# Patient Record
Sex: MALE | Race: WHITE
[De-identification: names, ages, dates, MRNs, and addresses within clinical notes are randomized per-mention and may not be internally consistent; named-entity substitution may affect disease eponyms.]

---

## 2017-04-13 ENCOUNTER — HOSPITAL ENCOUNTER (INPATIENT)
Dept: HOSPITAL 74 - JER | Age: 77
LOS: 6 days | Discharge: HOME HEALTH | DRG: 392 | End: 2017-04-19
Attending: INTERNAL MEDICINE | Admitting: INTERNAL MEDICINE
Payer: COMMERCIAL

## 2017-04-13 VITALS — BODY MASS INDEX: 32.1 KG/M2

## 2017-04-13 DIAGNOSIS — J44.9: ICD-10-CM

## 2017-04-13 DIAGNOSIS — K31.84: Primary | ICD-10-CM

## 2017-04-13 DIAGNOSIS — I25.10: ICD-10-CM

## 2017-04-13 DIAGNOSIS — I69.354: ICD-10-CM

## 2017-04-13 DIAGNOSIS — I10: ICD-10-CM

## 2017-04-13 DIAGNOSIS — K59.00: ICD-10-CM

## 2017-04-13 DIAGNOSIS — Z79.01: ICD-10-CM

## 2017-04-13 DIAGNOSIS — D68.2: ICD-10-CM

## 2017-04-13 DIAGNOSIS — K21.9: ICD-10-CM

## 2017-04-13 DIAGNOSIS — Z86.718: ICD-10-CM

## 2017-04-13 DIAGNOSIS — E78.00: ICD-10-CM

## 2017-04-13 DIAGNOSIS — G89.4: ICD-10-CM

## 2017-04-13 DIAGNOSIS — R13.10: ICD-10-CM

## 2017-04-13 DIAGNOSIS — R30.0: ICD-10-CM

## 2017-04-13 DIAGNOSIS — F41.9: ICD-10-CM

## 2017-04-13 LAB
ALBUMIN SERPL-MCNC: 3.4 G/DL (ref 3.4–5)
ALP SERPL-CCNC: 61 U/L (ref 45–117)
ALT SERPL-CCNC: 18 U/L (ref 12–78)
ANION GAP SERPL CALC-SCNC: 11 MMOL/L (ref 8–16)
AST SERPL-CCNC: 13 U/L (ref 15–37)
BASOPHILS # BLD: 0.6 % (ref 0–2)
BILIRUB SERPL-MCNC: 0.4 MG/DL (ref 0.2–1)
CALCIUM SERPL-MCNC: 8.6 MG/DL (ref 8.5–10.1)
CO2 SERPL-SCNC: 26 MMOL/L (ref 21–32)
COCKROFT - GAULT: 67.1
CREAT SERPL-MCNC: 1.3 MG/DL (ref 0.7–1.3)
DEPRECATED RDW RBC AUTO: 14.6 % (ref 11.9–15.9)
EOSINOPHIL # BLD: 3.7 % (ref 0–4.5)
GLUCOSE SERPL-MCNC: 104 MG/DL (ref 74–106)
INR BLD: 1.3 (ref 0.82–1.09)
MCH RBC QN AUTO: 31.1 PG (ref 25.7–33.7)
MCHC RBC AUTO-ENTMCNC: 33.2 G/DL (ref 32–35.9)
MCV RBC: 93.9 FL (ref 80–96)
NEUTROPHILS # BLD: 55.9 % (ref 42.8–82.8)
PLATELET # BLD AUTO: 203 K/MM3 (ref 134–434)
PMV BLD: 8 FL (ref 7.5–11.1)
PROT SERPL-MCNC: 6.6 G/DL (ref 6.4–8.2)
PT PNL PPP: 14.4 SEC (ref 9.98–11.88)
TROPONIN I SERPL-MCNC: < 0.02 NG/ML (ref 0–0.05)
WBC # BLD AUTO: 6 K/MM3 (ref 4–10)

## 2017-04-13 PROCEDURE — A9541 TC99M SULFUR COLLOID: HCPCS

## 2017-04-13 NOTE — PDOC
History of Present Illness





- General


History Source: Patient





<Phani Quintana - Last Filed: 04/14/17 00:48>





- General


History Source: Patient


Exam Limitations: No Limitations





- History of Present Illness


Initial Comments: 


04/13/17 21:50


The patient is a 77 year old male with significant past medical history of 

hypertension, hypercholesterolemia, CAD s/p stent x2, factor V leiden disorder, 

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 

and kidney stones who presents to the ED for 1 day of left-sided chest pain. 

Patient reports he was in his usual state of health when he suddenly developed 

sharp left-sided chest pain radiating to the left shoulder last night that he 

describes as constant and 7/10, in severity. He also reports associated one 

episode of vomiting last night and SOB, which he took albuterol with 

improvement. Denies lightheadedness, diaphoresis, or jaw pain. Patient states 

he was prescribed dilaudid for his h/o of chronic pain, which he decided to 

take last night prior to going to bed.  





The patient denies fever, chills, cough, abdominal pain, and diarrhea.





Allergies: NKDA


Social History: No alcohol, tobacco, or drug use reported. 


Past Surgical History: s/p cardiac stents x2


PCP: Dr. Benjie Meza


Cardio: Dr. Bg Joy 











<LeannaDebbi - Last Filed: 04/14/17 01:55>





- General


Chief Complaint: Chest Pain


Stated Complaint: CHEST DISCOMFORT/S.O.B


Time Seen by Provider: 04/13/17 21:10





Past History





- Past Medical History


Anemia: No


Asthma: No


Cancer: Yes (PROSTATE)


Cardiac Disorders: Yes (STENTS, FACTOR 5, CLOTTING DISORDER)


CVA: Yes (X2 '93 / '04 / (L) WEAKNESS)


COPD: Yes


CHF: No


Dementia: No


Diabetes: No


GI Disorders: Yes (diverticulosis,gerd)


 Disorders: Yes (kidney stone)


HTN: Yes


Hypercholesterolemia: Yes


Kidney Stones: Yes


Liver Disease: No


Suicide Attempt (Hx): No


Seizures: No


Thyroid Disease: No





- Surgical History


Abdominal Surgery: No


Appendectomy: No


Cardiac Surgery: Yes (2 STENTS, SUJIT FILTER)


Cholecystectomy: No


Lung Surgery: No


Neurologic Surgery: No





- Psycho/Social/Smoking Cessation Hx


Anxiety: Yes


Suicidal Ideation: No


Smoking Status: No


Smoking History: Never smoked


Have you smoked in the past 12 months: No


Number of Cigarettes Smoked Daily: 0


Information on smoking cessation initiated: No


Hx Alcohol Use: No


Drug/Substance Use Hx: No


Substance Use Type: None


Hx Substance Use Treatment: No





<KaliayingPhani - Last Filed: 04/14/17 00:48>





<Debbi Ram - Last Filed: 04/14/17 01:55>





- Past Medical History


Allergies/Adverse Reactions: 


 Allergies











Allergy/AdvReac Type Severity Reaction Status Date / Time


 


No Known Allergies Allergy   Verified 04/13/17 21:04











Home Medications: 


Ambulatory Orders





Hydromorphone HCl [Dilaudid] 8 mg PO TID PRN 08/26/16 


Linaclotide [Linzess] 290 mcg PO DAILY 08/26/16 


Thyroid [[place] Thyroid] 60 mg PO DAILY 08/26/16 


Zolpidem Tartrate [Ambien] 10 mg PO HS PRN 08/26/16 


Apixaban [Eliquis] 5 mg PO BID 04/13/17 


Omeprazole 40 mg PO DAILY 04/13/17 


Tamsulosin HCl 0.4 mg PO DAILY 04/13/17 


Morphine Sulfate 30 mg PO BID 04/14/17 


Ranolazine [Ranexa] 500 mg PO BID 04/14/17 











**Review of Systems





- Review of Systems


Able to Perform ROS?: Yes


Comments:: 





04/13/17 21:50


CONSTITUTIONAL: 


Absent: fever, chills, diaphoresis, generalized weakness, malaise, loss of 

appetite


HEENT: 


Absent: rhinorrhea, nasal congestion, throat pain, throat swelling, difficulty 

swallowing, mouth swelling, ear pain, eye pain, visual Changes


CARDIOVASCULAR: 


+left-sided chest pain radiating to the left shoulder Absent: chest pain, 

syncope, palpitations, irregular heart rate, lightheadedness


RESPIRATORY: 


+SOB Absent: cough, dyspnea with exertion, orthopnea, wheezing, stridor, 

hemoptysis


GASTROINTESTINAL:


+vomiting Absent: abdominal pain, abdominal distension, diarrhea, constipation, 

melena, hematochezia


GENITOURINARY: 


Absent: dysuria, frequency, urgency, hesitancy, hematuria, flank pain, genital 

pain


MUSCULOSKELETAL: 


Absent: myalgia, joint swelling


SKIN: 


Absent: rash, itching, pallor


NEUROLOGIC: 


Absent: headache, focal weakness or paresthesias, dizziness, unsteady gait, 

seizure, mental status changes, bladder or bowel incontinence


PSYCHIATRIC: 


Absent: anxiety, depression, suicidal or homicidal ideation, hallucinations.











<JoseMaurice figueroata - Last Filed: 04/14/17 01:55>





*Physical Exam





- Vital Signs


 Last Vital Signs











Temp Pulse Resp BP Pulse Ox


 


 97.9 F   82   22   151/81   95 


 


 04/13/17 21:04  04/13/17 21:04  04/13/17 21:04  04/13/17 21:04  04/13/17 21:04














<Phani Quintana - Last Filed: 04/14/17 00:48>





- Vital Signs


 Last Vital Signs











Temp Pulse Resp BP Pulse Ox


 


 97.9 F   82   22   151/81   95 


 


 04/13/17 21:04  04/13/17 21:04  04/13/17 21:04  04/13/17 21:04  04/13/17 21:04














- Physical Exam


Comments: 





04/13/17 21:50


GENERAL:


Well developed, well nourished. Awake and alert. No acute distress.


HEENT:


Normocephalic, atraumatic. PERRLA, EOMI. No conjunctival pallor. Sclera are non-

icteric. Moist mucous membranes. Oropharynx is clear.


NECK: 


Supple. Full ROM. No JVD. Carotid pulses 2+ and symmetric, without bruits. No 

thyromegaly. No lymphadenopathy.


CARDIOVASCULAR:


Regular rate and rhythm. No murmurs, rubs, or gallops. Distal pulses are 2+ and 

symmetric. 


PULMONARY: 


No evidence of respiratory distress. Lungs clear to auscultation bilaterally. 

No wheezing, rales or rhonchi.


ABDOMINAL:


Soft. Diffuse abdominal tenderness. Non-distended. No rebound or guarding. No 

organomegaly. Normoactive bowel sounds. 


MUSCULOSKELETAL 


Normal range of motion at all joints. No bony deformities or tenderness. 

Moderate right CVA tenderness.


EXTREMITIES: 


No cyanosis. No clubbing. +2 pitting edema bilateral lower extremities. No calf 

tenderness.


SKIN: 


Warm and dry. Normal capillary refill. No rashes. No jaundice. 


NEUROLOGICAL: 


Alert, awake, appropriate. Cranial nerves 2-12 intact. Moving all extremities. 

No gross focal neurological deficits. 


PSYCHIATRIC: 


Cooperative. Good eye contact. Appropriate mood and affect.








<Debbi Ram - Last Filed: 04/14/17 01:55>





**Heart Score/ECG Review





- ECG Impressions


Comment:: 





04/13/17 21:50


NSR @82bpm


Possible L atrial enlargement 


Borderline ECG 





<Debbi Ram - Last Filed: 04/14/17 01:55>





ED Treatment Course





- LABORATORY


CBC & Chemistry Diagram: 


 04/13/17 21:32





 04/13/17 21:32





<Phani Quintana - Last Filed: 04/14/17 00:48>





- LABORATORY


CBC & Chemistry Diagram: 


 04/13/17 21:32





 04/13/17 21:32





<Dbebi Ram - Last Filed: 04/14/17 01:55>





Medical Decision Making





- Medical Decision Making





04/14/17 00:48


Dr. Quintana: The scribe's documentation has been prepared under my direction 

and personally reviewed by me in its entirery. I confirm that the note above 

accurately reflects all work, treatment, procedures, and medical decision 

making performed by me.





Pt still having pain.  Wants to be evaluated more for pain.  Will admit.  Spoke 

to Dr. Rowland for orders





<Phani Quintana - Last Filed: 04/14/17 00:48>





- Medical Decision Making





04/14/17 00:26


Patient's case discussed with Dr. Daniel Menard who is covering for Dr. Sterling Ramesh who is covering for Dr. Benjie Meza. 





<Debbi Ram - Last Filed: 04/14/17 01:55>





*DC/Admit/Observation/Transfer





- Discharge Dispostion


Admit: Yes





<Phani Quintana - Last Filed: 04/14/17 00:48>





- Attestations


Scribe Attestion: 





04/13/17 21:51





Documentation prepared by Debbi Ram, acting as medical scribe for Phani Quintana MD





<Debbi Ram - Last Filed: 04/14/17 01:55>


Diagnosis at time of Disposition: 


 Chronic pain syndrome





- Discharge Dispostion


Condition at time of disposition: Stable





- Referrals


Referrals: 


Benjie Meza MD [Primary Care Provider] -

## 2017-04-14 LAB
ANION GAP SERPL CALC-SCNC: 9 MMOL/L (ref 8–16)
BASOPHILS # BLD: 1 % (ref 0–2)
CALCIUM SERPL-MCNC: 8.2 MG/DL (ref 8.5–10.1)
CO2 SERPL-SCNC: 26 MMOL/L (ref 21–32)
COCKROFT - GAULT: 76.33
CREAT SERPL-MCNC: 1.2 MG/DL (ref 0.7–1.3)
DEPRECATED RDW RBC AUTO: 14.4 % (ref 11.9–15.9)
EOSINOPHIL # BLD: 4.9 % (ref 0–4.5)
GLUCOSE SERPL-MCNC: 94 MG/DL (ref 74–106)
MAGNESIUM SERPL-MCNC: 2.3 MG/DL (ref 1.8–2.4)
MCH RBC QN AUTO: 31.7 PG (ref 25.7–33.7)
MCHC RBC AUTO-ENTMCNC: 34 G/DL (ref 32–35.9)
MCV RBC: 93.2 FL (ref 80–96)
NEUTROPHILS # BLD: 52.3 % (ref 42.8–82.8)
PHOSPHATE SERPL-MCNC: 3.8 MG/DL (ref 2.5–4.9)
PLATELET # BLD AUTO: 168 K/MM3 (ref 134–434)
PMV BLD: 7.5 FL (ref 7.5–11.1)
TROPONIN I SERPL-MCNC: < 0.02 NG/ML (ref 0–0.05)
WBC # BLD AUTO: 5.8 K/MM3 (ref 4–10)

## 2017-04-14 RX ADMIN — MORPHINE SULFATE SCH MG: 30 TABLET, EXTENDED RELEASE ORAL at 10:34

## 2017-04-14 RX ADMIN — ZOLPIDEM TARTRATE PRN MG: 5 TABLET, COATED ORAL at 22:54

## 2017-04-14 RX ADMIN — RANITIDINE SCH MG: 150 TABLET ORAL at 21:32

## 2017-04-14 RX ADMIN — TAMSULOSIN HYDROCHLORIDE SCH MG: 0.4 CAPSULE ORAL at 08:51

## 2017-04-14 RX ADMIN — Medication SCH MG: at 10:42

## 2017-04-14 RX ADMIN — DOCUSATE SODIUM SCH MG: 100 CAPSULE, LIQUID FILLED ORAL at 21:31

## 2017-04-14 RX ADMIN — POLYETHYLENE GLYCOL 3350 SCH GRAMS: 17 POWDER, FOR SOLUTION ORAL at 17:57

## 2017-04-14 RX ADMIN — FLUTICASONE PROPIONATE SCH SPRAY: 50 SPRAY, METERED NASAL at 10:36

## 2017-04-14 RX ADMIN — MORPHINE SULFATE SCH MG: 30 TABLET, EXTENDED RELEASE ORAL at 21:31

## 2017-04-14 RX ADMIN — APIXABAN SCH MG: 5 TABLET, FILM COATED ORAL at 22:37

## 2017-04-14 RX ADMIN — APIXABAN SCH MG: 5 TABLET, FILM COATED ORAL at 10:34

## 2017-04-14 RX ADMIN — RANOLAZINE SCH MG: 500 TABLET, FILM COATED, EXTENDED RELEASE ORAL at 10:34

## 2017-04-14 RX ADMIN — PANTOPRAZOLE SODIUM SCH MG: 40 TABLET, DELAYED RELEASE ORAL at 10:34

## 2017-04-14 RX ADMIN — RANOLAZINE SCH MG: 500 TABLET, FILM COATED, EXTENDED RELEASE ORAL at 21:31

## 2017-04-14 RX ADMIN — ISOSORBIDE MONONITRATE SCH MG: 30 TABLET, EXTENDED RELEASE ORAL at 10:37

## 2017-04-14 NOTE — CON.GI
Consult


Consult Specialty:: GASTROENTEROLOGY


Reason for Consultation:: CHRONIC ABDOMINAL PAIN, NAUSEA AND VOMITING, 

WORSENING REFLUX





- History of Present Illness


Chief Complaint: LEFT SIDED FLANK PAIN, GERD, NAUSEA AND VOMITING


History of Present Illness: 


THIS IS A 77 YEAR OLD MALE WITH CHRONIC PAIN SYNDROME, HISTORY OF PROSTATE 

CANCER S/P SEED IMPLANTS, DVT, FACTOR V DEF ON ELIQUIS AND S/P IVC, ON 

NARCOTICS ADMITTED WITH SEVERE UPPER LEFT FLANK PAIN.  CT RENAL PROTOCOL WAS 

NEGATIVE FOR RENAL STONES. CONSULT CALLED FOR FURTHER EVALUATION.  HE ALSO C/O 

OF INCREASED REFLUX SYMPTOMS WITH NAUSEA AND VOMITING. HE DOES C/O OF MILD 

DYSPHAGIA. HE HAS NO WEIGHT LOSS.  HE WAKES A NIGHT WITH THIS REFLUX AND 

REGURGITATION.  HE DOES EAT LATE AT NIGHT AND EVEN WAKES AT NIGHT AND EATS THEN 

LAYS BACK DOWN IN BED.  





HE STATES THAT HE IS CONSTIPATED AND HAS A HARD BM THAT HE REQUIRES HIM TO 

STRAIN WHEN HAVING A BM.  HE DENIES BLEEDING.  HE HAS PAIN INTERMITTENTLY IN 

VARIOUS AREAS OF HIS ABDOMEN EVERYDAY. HE IS ON DILAUDID FOR RELIEF OF THIS 

PAIN AND VARIOUS OTHERS. 





- History Source


History Provided By: Patient


Limitations to Obtaining History: No Limitations





- Past Medical History


CNS: Yes: CVA


Cardio/Vascular: Yes: CAD (multiple stents), Deep Vein Thrombosis, HTN, 

Hyperlipdemia, MI


Pulmonary: Yes: COPD, Pulmonary Fibrosis


Gastrointestinal: Yes: Constipation, GERD


Renal/: Yes: BPH, Cancer (Prostate)


Psych: Yes: Anxiety


Musculoskeletal: Yes: Osteoarthritis, Other (Chronic pain syndrome)





- Past Surgical History


Past Surgical History: Yes: Colonoscopy, Stent, Upper Endoscopy


Additional Surgical History: SEED IMPLANTS PROSTATE





- Alcohol/Substance Use


Hx Alcohol Use: No


History of Substance Use: reports: None





- Smoking History


Smoking history: Never smoked


Have you smoked in the past 12 months: No


Aproximately how many cigarettes per day: 0





- Social History


ADL: Independent


Occupation: Retired 


History of Recent Travel: No





Home Medications





- Allergies


Allergies/Adverse Reactions: 


 Allergies











Allergy/AdvReac Type Severity Reaction Status Date / Time


 


No Known Allergies Allergy   Verified 04/13/17 21:04














- Home Medications


Home Medications: 


Ambulatory Orders





Hydromorphone HCl [Dilaudid] 8 mg PO TID PRN 08/26/16 


Linaclotide [Linzess] 290 mcg PO DAILY 08/26/16 


Thyroid [[place] Thyroid] 60 mg PO DAILY 08/26/16 


Zolpidem Tartrate [Ambien] 10 mg PO HS PRN 08/26/16 


Apixaban [Eliquis] 5 mg PO BID 04/13/17 


Omeprazole 40 mg PO DAILY 04/13/17 


Tamsulosin HCl 0.4 mg PO DAILY 04/13/17 


Morphine Sulfate 30 mg PO BID 04/14/17 


Ranolazine [Ranexa] 500 mg PO BID 04/14/17 











Family Disease History





- Family Disease History


Family History: Unremarkable





Review of Systems





- Review of Systems


Constitutional: reports: No Symptoms


Eyes: reports: No Symptoms


HENT: reports: No Symptoms


Neck: reports: No Symptoms


Cardiovascular: reports: No Symptoms


Respiratory: reports: No Symptoms


Gastrointestinal: reports: Abdominal Pain, Constipation, Indigestion, Nausea, 

Vomiting


Musculoskeletal: reports: Joint Pain, Muscle Pain


Integumentary: reports: No Symptoms


Neurological: reports: No Symptoms


Hematology/Lymphatic: reports: No Symptoms


Psychiatric: reports: Anxiety





Physical Exam-GI


Vital Signs: 


 Vital Signs











Temperature  97.6 F   04/14/17 15:05


 


Pulse Rate  83   04/14/17 15:05


 


Respiratory Rate  15   04/14/17 15:05


 


Blood Pressure  102/57   04/14/17 15:05


 


O2 Sat by Pulse Oximetry (%)  94 L  04/14/17 13:16











Constitutional: Yes: Well Nourished


Eyes: Yes: Conjunctiva Clear


HENT: Yes: Normocephalic


Neck: Yes: Supple


Cardiovascular: Yes: Regular Rate and Rhythm


Respiratory: Yes: Rales (DRY RALES)


Gastrointestinal Inspection: Yes: Other (DISTENSION)


...Auscultate: Yes: Hyperactive Bowel Sounds


...Palpate: Yes: Tenderness (LLQ PAIN,RIGHT FLANK PAIN)


Genitourinary: Yes: WNL


Musculoskeletal: Yes: WNL


Extremities: Yes: WNL


Neurological: Yes: Alert, Oriented


Labs: 


 INR, PTT











INR  1.30  (0.82-1.09)  H D 04/13/17  21:32    








 Laboratory Tests











  04/13/17 04/13/17 04/14/17





  21:32 21:32 09:50


 


WBC    5.8


 


RBC    4.34


 


Hgb    13.7


 


Hct    40.4


 


MCV    93.2


 


MCHC    34.0


 


RDW    14.4


 


Plt Count    168


 


MPV    7.5


 


Neutrophils %    52.3


 


Lymphocytes %    28.6


 


Monocytes %    13.2 H


 


Eosinophils %    4.9 H


 


INR  1.30 H D  


 


Sodium   


 


Potassium   


 


Creatinine   


 


Random Glucose   


 


Phosphorus   


 


Magnesium   


 


Lipase   115 














  04/14/17





  09:50


 


WBC 


 


RBC 


 


Hgb 


 


Hct 


 


MCV 


 


MCHC 


 


RDW 


 


Plt Count 


 


MPV 


 


Neutrophils % 


 


Lymphocytes % 


 


Monocytes % 


 


Eosinophils % 


 


INR 


 


Sodium  141


 


Potassium  4.0


 


Creatinine  1.2


 


Random Glucose  94


 


Phosphorus  3.8


 


Magnesium  2.3


 


Lipase 














Imaging





- Results


Cat Scan: Pending, Image Reviewed (CTA PENDING)





Problem List





- Problems


(1) Constipation


Assessment/Plan: 


HIS EXAM IS NON SPECIFIC AND I BELIEVE THIS IS ALL NARCOTIC INDUCED PAIN AND 

CONSTIPATION. I NEED TO ALSO RULE OUT MESENTERIC ISCHEMIA BUT I DOUBT THE CTA 

WOULD BE POSITIVE.  GIVEN HIS VASCULAR DISEASE THE EXAM SHOULD BE DONE. 





I WOULD ADD MIRALAX Q DAY, ADD COLACE, AND PROBABLY ADD MOVANTIK AS AN 

OUTPATIENT.  HE NEEDS A COLONOSCOPY BUT WOULD HAVE TO BE DONE BRIDGED WITH 

LOVENOX. 


Code(s): K59.00 - CONSTIPATION, UNSPECIFIED   





(2) GERD (gastroesophageal reflux disease)


Assessment/Plan: 


DIETARY HABITS HAVE INCREASED THE SYMPTOMS OF GERD. DUE TO MEDS HE MAY HAVE 

SOME PARESIS.  I HAVE SENT HIM FOR A GRASTRIC EMPTYING SCAN.  ADDED ZANTAC TO 

AVOID PPi WHILE ON ELIGUIS.


Code(s): K21.9 - GASTRO-ESOPHAGEAL REFLUX DISEASE WITHOUT ESOPHAGITIS   





(3) Dysphagia


Assessment/Plan: 


NO WEIGHT LOSS. WOULD ALSO NEED AN EGD EVENTUALLY.


Code(s): R13.10 - DYSPHAGIA, UNSPECIFIED   





(4) Chronic abdominal pain


Code(s): R10.9 - UNSPECIFIED ABDOMINAL PAIN


G89.29 - OTHER CHRONIC PAIN





(5) Chronic pain syndrome


Code(s): G89.4 - CHRONIC PAIN SYNDROME





(6) CAD (coronary artery disease)


Code(s): I25.10 - ATHSCL HEART DISEASE OF NATIVE CORONARY ARTERY W/O ANG PCTRS 

  





(7) Anxiety


Code(s): F41.9 - ANXIETY DISORDER, UNSPECIFIED





(8) COPD (chronic obstructive pulmonary disease)


Code(s): J44.9 - CHRONIC OBSTRUCTIVE PULMONARY DISEASE, UNSPECIFIED   





(9) CVA (cerebral vascular accident)


Code(s): I63.9 - CEREBRAL INFARCTION, UNSPECIFIED   





(10) Factor V deficiency


Code(s): D68.2 - HEREDITARY DEFICIENCY OF OTHER CLOTTING FACTORS





(11) Hypercholesteremia


Code(s): E78.0 - PURE HYPERCHOLESTEROLEMIA * DO NOT USE *

## 2017-04-14 NOTE — HP
Admitting History and Physical





- Primary Care Physician


PCP: Benjie Meza





- Admission


Chief Complaint: I'm hurting


History of Present Illness: 


Mr Oliva is a 77 year old male who comes in with complaints of pain. Because 

of a CVA history he has some difficulty describing his pain. He says he has 

pain in his chest, abdomen, and groin. He says he is having L sided chest pain 

but keeps pointing to his R back and saying the pain is there. He says it is 

"ulcer" pain. He complains of groin pain and says it has been there since his 

bladder was seeded. He says he has abdominal pain, says it is epigastric in 

nature and from reflux. He denies fevers, chills, lightheadedness, passing out, 

shortness of breath, diarrhea, constipation, or swelling. He says he was 

nauseated and threw up once Wednesday morning, however he was also drinking 

Tuesday night and is reluctant to tell me how much.


History Source: Patient


Limitations to Obtaining History: Clinical Condition





- Past Medical History


CNS: Yes: CVA


Cardiovascular: Yes: CAD (multiple stents), Deep Vein Thrombosis, HTN, 

Hyperlipdemia, MI


Pulmonary: Yes: COPD


Gastrointestinal: Yes: GERD


Renal/: Yes: BPH, Cancer (Prostate)


Heme/Onc: Yes: Hypercoaguable State (Factor 5 Clotting Disorder)


Psych: Yes: Anxiety


Musculoskeletal: Yes: Osteoarthritis, Other (Chronic pain syndrome)





- Past Surgical History


Past Surgical History: Yes: Stent





- Smoking History


Smoking history: Never smoked


Have you smoked in the past 12 months: No


Aproximately how many cigarettes per day: 0





- Alcohol/Substance Use


Hx Alcohol Use: Yes


History of Substance Use: reports: None





- Social History


ADL: Independent


Occupation: Retired 


History of Recent Travel: No





Home Medications





- Allergies


Allergies/Adverse Reactions: 


 Allergies











Allergy/AdvReac Type Severity Reaction Status Date / Time


 


No Known Allergies Allergy   Verified 04/13/17 21:04














- Home Medications


Home Medications: 


Ambulatory Orders





Hydromorphone HCl [Dilaudid] 8 mg PO TID PRN 08/26/16 


Linaclotide [Linzess] 290 mcg PO DAILY 08/26/16 


Thyroid [[place] Thyroid] 60 mg PO DAILY 08/26/16 


Zolpidem Tartrate [Ambien] 10 mg PO HS PRN 08/26/16 


Apixaban [Eliquis] 5 mg PO BID 04/13/17 


Omeprazole 40 mg PO DAILY 04/13/17 


Tamsulosin HCl 0.4 mg PO DAILY 04/13/17 


Morphine Sulfate 30 mg PO BID 04/14/17 


Ranolazine [Ranexa] 500 mg PO BID 04/14/17 











Family Disease History





- Family Disease History


Family History: Unremarkable





Review of Systems


Findings/Remarks: 


Full review of systems obtained, as per HPI and otherwise negative





Physical Examination


Vital Signs: 


 Vital Signs











Temperature  98.6 F   04/14/17 09:00


 


Pulse Rate  72   04/14/17 09:00


 


Respiratory Rate  18   04/14/17 09:00


 


Blood Pressure  124/59   04/14/17 09:00


 


O2 Sat by Pulse Oximetry (%)  94 L  04/14/17 09:00











Constitutional: Yes: Well Nourished, No Distress, Calm


Eyes: Yes: Conjunctiva Clear, EOM Intact


HENT: Yes: Atraumatic, Normocephalic


Cardiovascular: Yes: Regular Rate and Rhythm.  No: Gallop, Murmur, Rub


Respiratory: Yes: Regular, CTA Bilaterally.  No: Rales, Rhonchi, Wheezes


Gastrointestinal: Yes: Normal Bowel Sounds, Soft.  No: Distention, Tenderness


Extremities: Yes: WNL


Edema: No


Labs: 


 CBC, BMP





 04/14/17 09:50 





 04/14/17 09:50 











Imaging





- Results


Cat Scan: Report Reviewed





Problem List





- Problems


(1) Chronic abdominal pain


Code(s): R10.9 - UNSPECIFIED ABDOMINAL PAIN


G89.29 - OTHER CHRONIC PAIN





(2) GERD (gastroesophageal reflux disease)


Code(s): K21.9 - GASTRO-ESOPHAGEAL REFLUX DISEASE WITHOUT ESOPHAGITIS   





(3) CAD (coronary artery disease)


Code(s): I25.10 - ATHSCL HEART DISEASE OF NATIVE CORONARY ARTERY W/O ANG PCTRS 

  





(4) COPD (chronic obstructive pulmonary disease)


Code(s): J44.9 - CHRONIC OBSTRUCTIVE PULMONARY DISEASE, UNSPECIFIED   





(5) CVA (cerebral vascular accident)


Code(s): I63.9 - CEREBRAL INFARCTION, UNSPECIFIED   





(6) HTN (hypertension)


Code(s): I10 - ESSENTIAL (PRIMARY) HYPERTENSION   Qualifiers: 


     Hypertension type: essential hypertension     Qualified Code(s): I10 - 

Essential (primary) hypertension  





(7) Factor V deficiency


Code(s): D68.2 - HEREDITARY DEFICIENCY OF OTHER CLOTTING FACTORS








Assessment/Plan


-patient admitted under observation


-continue home regimen


-GI consulted for abdominal pain


-low suspicion this is cardiac pain, can hold off on cardiology consult


-monitor for improvement

## 2017-04-14 NOTE — EKG
Test Reason : 

Blood Pressure : ***/*** mmHG

Vent. Rate : 082 BPM     Atrial Rate : 082 BPM

   P-R Int : 160 ms          QRS Dur : 090 ms

    QT Int : 404 ms       P-R-T Axes : 048 -15 019 degrees

   QTc Int : 472 ms

 

NORMAL SINUS RHYTHM

POSSIBLE LEFT ATRIAL ENLARGEMENT

INCOMPLETE RBBB

WHEN COMPARED WITH ECG OF 12-DEC-2016 17:19,

NO SIGNIFICANT CHANGE WAS FOUND

Confirmed by JORDYN BUSH MD ([id number]) on 4/14/2017 9:19:33 AM

 

Referred By:             Confirmed By:JORDYN BUSH MD

## 2017-04-15 LAB
ANION GAP SERPL CALC-SCNC: 7 MMOL/L (ref 8–16)
BASOPHILS # BLD: 0.9 % (ref 0–2)
CALCIUM SERPL-MCNC: 8.2 MG/DL (ref 8.5–10.1)
CO2 SERPL-SCNC: 27 MMOL/L (ref 21–32)
COCKROFT - GAULT: 91.6
CREAT SERPL-MCNC: 1 MG/DL (ref 0.7–1.3)
DEPRECATED RDW RBC AUTO: 14.6 % (ref 11.9–15.9)
EOSINOPHIL # BLD: 6 % (ref 0–4.5)
GLUCOSE SERPL-MCNC: 93 MG/DL (ref 74–106)
MAGNESIUM SERPL-MCNC: 2.3 MG/DL (ref 1.8–2.4)
MCH RBC QN AUTO: 31.6 PG (ref 25.7–33.7)
MCHC RBC AUTO-ENTMCNC: 33.7 G/DL (ref 32–35.9)
MCV RBC: 93.9 FL (ref 80–96)
NEUTROPHILS # BLD: 57.1 % (ref 42.8–82.8)
PHOSPHATE SERPL-MCNC: 3.2 MG/DL (ref 2.5–4.9)
PLATELET # BLD AUTO: 166 K/MM3 (ref 134–434)
PMV BLD: 7.5 FL (ref 7.5–11.1)
WBC # BLD AUTO: 5.3 K/MM3 (ref 4–10)

## 2017-04-15 RX ADMIN — RANOLAZINE SCH MG: 500 TABLET, FILM COATED, EXTENDED RELEASE ORAL at 22:07

## 2017-04-15 RX ADMIN — ALBUTEROL SULFATE SCH AMP: 2.5 SOLUTION RESPIRATORY (INHALATION) at 22:47

## 2017-04-15 RX ADMIN — Medication SCH MG: at 10:02

## 2017-04-15 RX ADMIN — RANOLAZINE SCH MG: 500 TABLET, FILM COATED, EXTENDED RELEASE ORAL at 10:06

## 2017-04-15 RX ADMIN — TAMSULOSIN HYDROCHLORIDE SCH MG: 0.4 CAPSULE ORAL at 08:51

## 2017-04-15 RX ADMIN — MORPHINE SULFATE SCH MG: 30 TABLET, EXTENDED RELEASE ORAL at 10:04

## 2017-04-15 RX ADMIN — SENNOSIDES SCH: 8.6 TABLET, FILM COATED ORAL at 22:08

## 2017-04-15 RX ADMIN — SENNOSIDES SCH TAB: 8.6 TABLET, FILM COATED ORAL at 01:53

## 2017-04-15 RX ADMIN — ZOLPIDEM TARTRATE PRN MG: 5 TABLET, COATED ORAL at 22:08

## 2017-04-15 RX ADMIN — POLYETHYLENE GLYCOL 3350 SCH GRAMS: 17 POWDER, FOR SOLUTION ORAL at 10:07

## 2017-04-15 RX ADMIN — ALBUTEROL SULFATE SCH AMP: 2.5 SOLUTION RESPIRATORY (INHALATION) at 14:50

## 2017-04-15 RX ADMIN — FLUTICASONE PROPIONATE SCH SPRAY: 50 SPRAY, METERED NASAL at 10:04

## 2017-04-15 RX ADMIN — RANITIDINE SCH MG: 150 TABLET ORAL at 22:10

## 2017-04-15 RX ADMIN — MORPHINE SULFATE SCH MG: 30 TABLET, EXTENDED RELEASE ORAL at 22:10

## 2017-04-15 RX ADMIN — APIXABAN SCH MG: 5 TABLET, FILM COATED ORAL at 22:11

## 2017-04-15 RX ADMIN — APIXABAN SCH MG: 5 TABLET, FILM COATED ORAL at 10:04

## 2017-04-15 RX ADMIN — DOCUSATE SODIUM SCH MG: 100 CAPSULE, LIQUID FILLED ORAL at 22:07

## 2017-04-15 RX ADMIN — ISOSORBIDE MONONITRATE SCH MG: 30 TABLET, EXTENDED RELEASE ORAL at 10:04

## 2017-04-15 RX ADMIN — RANITIDINE SCH MG: 150 TABLET ORAL at 10:06

## 2017-04-15 RX ADMIN — PANTOPRAZOLE SODIUM SCH MG: 40 TABLET, DELAYED RELEASE ORAL at 10:06

## 2017-04-15 NOTE — PN
Progress Note, Physician


History of Present Illness: 


Still with abd pain, notes that he cannot cut down on his pain meds due to 

other pain in body (groin and arthritis pain).  Breathing has been feeling heavy

, asking for nebulized treatments.





- Current Medication List


Current Medications: 


Active Medications





Acetaminophen (Tylenol -)  650 mg PO Q4H PRN


   PRN Reason: FEVER OR PAIN


Albuterol Sulfate (Ventolin Hfa Inhaler -)  1 puff IH Q6H PRN


   PRN Reason: SHORTNESS OF BREATH


   Last Admin: 04/14/17 10:36 Dose:  1 puff


Albuterol Sulfate (Ventolin 0.083% Nebulizer Soln -)  1 amp NEB TIDR [facility]


Apixaban (Eliquis -)  5 mg PO BID [facility]


   Last Admin: 04/15/17 10:04 Dose:  5 mg


Docusate Sodium (Colace -)  300 mg PO HS [facility]


   Last Admin: 04/14/17 21:31 Dose:  300 mg


Emollient Ointment (Aquaphor -)  1 applic TP BID PRN


Fluticasone Propionate (Flonase -)  1 spray NS DAILY [facility]


   Last Admin: 04/15/17 10:04 Dose:  1 spray


Hydromorphone HCl (Dilaudid -)  8 mg PO Q8H PRN


   Last Admin: 04/14/17 05:50 Dose:  8 mg


Isosorbide Mononitrate (Imdur -)  30 mg PO DAILY [facility]


   Last Admin: 04/15/17 10:04 Dose:  30 mg


Morphine Sulfate (Ms Contin -)  30 mg PO BID [facility]


   Last Admin: 04/15/17 10:04 Dose:  30 mg


Nf Med(Align Probiotic Supplement )  1 each PO DAILY [facility]


   Last Admin: 04/15/17 10:05 Dose:  1 each


Nf Med(Pancreatin (1300))  1 each PO TIDAC [facility]


   Last Admin: 04/15/17 12:24 Dose:  1 each


Nf Med(Simethicone (125mg))  1 each PO ACHS PRN


Nf Med(Simethicone (125mg))  2 each PO ACHS PRN


Nf Med (Linzess (290mcg))  1 each PO DAILY@0700 [facility]


Ondansetron HCl (Zofran Injection)  4 mg IVPB Q6H PRN


   PRN Reason: NAUSEA


Pantoprazole Sodium (Protonix -)  40 mg PO DAILY [facility]


   Last Admin: 04/15/17 10:06 Dose:  40 mg


Polyethylene Glycol (Miralax (For Daily Use) -)  17 gm PO DAILY [facility]


   Last Admin: 04/15/17 10:07 Dose:  17 grams


Ranitidine HCl (Zantac -)  150 mg PO BID [facility]


   Last Admin: 04/15/17 10:06 Dose:  150 mg


Ranolazine (Ranexa -)  500 mg PO BID [facility]


   Last Admin: 04/15/17 10:06 Dose:  500 mg


Senna (Senna -)  2 tab PO HS [facility]


   Last Admin: 04/15/17 01:53 Dose:  2 tab


Tamsulosin HCl (Flomax -)  0.4 mg PO DAILY@0830 [facility]


   Last Admin: 04/15/17 08:51 Dose:  0.4 mg


Thyroid ([place] Thyroid -)  60 mg PO DAILY@0700 [facility]


Tramadol HCl (Ultram -)  50 mg PO Q6H PRN


Zolpidem Tartrate (Ambien -)  5 mg PO HS PRN


   Last Admin: 04/14/17 22:54 Dose:  5 mg











- Objective


Vital Signs: 


 Vital Signs











Temperature  97.8 F   04/15/17 10:00


 


Pulse Rate  75   04/15/17 10:00


 


Respiratory Rate  18   04/15/17 10:00


 


Blood Pressure  120/60   04/15/17 10:00


 


O2 Sat by Pulse Oximetry (%)  98   04/14/17 21:16











Constitutional: Yes: No Distress, Calm


HENT: Yes: Atraumatic, Normocephalic


Neck: Yes: Supple, Trachea Midline


Cardiovascular: Yes: Regular Rate and Rhythm, S1, S2.  No: Murmur


Respiratory: Yes: Regular, Diminished (bilaterally)


Gastrointestinal: Yes: Normal Bowel Sounds, Soft, Abdomen, Obese.  No: 

Distention, Tenderness


Edema: Yes


Edema: LLE: Trace, RLE: Trace


Neurological: Yes: Alert, Oriented


Labs: 


 CBC, BMP





 04/15/17 07:30 





 04/15/17 07:30 





 INR, PTT











INR  1.30  (0.82-1.09)  H D 04/13/17  21:32    














Assessment/Plan


All Active Problems





Chronic abdominal pain (Acute) 


Chronic pain syndrome (Acute) 


Constipation (Acute) 


Dysphagia (Acute) 


GERD (gastroesophageal reflux disease) (Acute) 


Anxiety (Acute) 


COPD (chronic obstructive pulmonary disease) (Acute) 


CVA (cerebral vascular accident) (Acute) 


Factor V deficiency (Acute) 


HTN (hypertension) (Acute) 


Hypercholesteremia (Acute) 


Multiple contusions (Acute) 





-likely abd pain from narcotic use, so may need to start Movantik as outpatient

, but to get gastric emptying study for further eval -will increase Miralax to 

bid as well





-start albuterol nebs for COPD

## 2017-04-16 RX ADMIN — DOCUSATE SODIUM SCH MG: 100 CAPSULE, LIQUID FILLED ORAL at 21:40

## 2017-04-16 RX ADMIN — RANITIDINE SCH MG: 150 TABLET ORAL at 21:42

## 2017-04-16 RX ADMIN — SENNOSIDES SCH TAB: 8.6 TABLET, FILM COATED ORAL at 21:41

## 2017-04-16 RX ADMIN — TAMSULOSIN HYDROCHLORIDE SCH MG: 0.4 CAPSULE ORAL at 08:39

## 2017-04-16 RX ADMIN — POLYETHYLENE GLYCOL 3350 SCH: 17 POWDER, FOR SOLUTION ORAL at 10:05

## 2017-04-16 RX ADMIN — MORPHINE SULFATE SCH MG: 30 TABLET, EXTENDED RELEASE ORAL at 10:05

## 2017-04-16 RX ADMIN — PANTOPRAZOLE SODIUM SCH MG: 40 TABLET, DELAYED RELEASE ORAL at 10:05

## 2017-04-16 RX ADMIN — RANOLAZINE SCH MG: 500 TABLET, FILM COATED, EXTENDED RELEASE ORAL at 21:41

## 2017-04-16 RX ADMIN — ALBUTEROL SULFATE SCH AMP: 2.5 SOLUTION RESPIRATORY (INHALATION) at 22:37

## 2017-04-16 RX ADMIN — ALBUTEROL SULFATE SCH AMP: 2.5 SOLUTION RESPIRATORY (INHALATION) at 13:45

## 2017-04-16 RX ADMIN — Medication SCH MG: at 06:06

## 2017-04-16 RX ADMIN — APIXABAN SCH MG: 5 TABLET, FILM COATED ORAL at 10:04

## 2017-04-16 RX ADMIN — FLUTICASONE PROPIONATE SCH SPRAY: 50 SPRAY, METERED NASAL at 10:04

## 2017-04-16 RX ADMIN — ISOSORBIDE MONONITRATE SCH MG: 30 TABLET, EXTENDED RELEASE ORAL at 10:05

## 2017-04-16 RX ADMIN — APIXABAN SCH MG: 5 TABLET, FILM COATED ORAL at 21:41

## 2017-04-16 RX ADMIN — RANOLAZINE SCH MG: 500 TABLET, FILM COATED, EXTENDED RELEASE ORAL at 10:05

## 2017-04-16 RX ADMIN — RANITIDINE SCH MG: 150 TABLET ORAL at 10:05

## 2017-04-16 RX ADMIN — ALBUTEROL SULFATE SCH AMP: 2.5 SOLUTION RESPIRATORY (INHALATION) at 06:30

## 2017-04-16 RX ADMIN — MORPHINE SULFATE SCH MG: 30 TABLET, EXTENDED RELEASE ORAL at 21:41

## 2017-04-16 NOTE — PN
Progress Note, Physician


History of Present Illness: 


Was having HA, does note a fall earlier this week (and takes AC).  Did have a 

bowel movement last night, btu still with abd pain (continues to have right 

groin pain as well -chronic).





- Current Medication List


Current Medications: 


Active Medications





Acetaminophen (Tylenol -)  650 mg PO Q4H PRN


   PRN Reason: FEVER OR PAIN


Albuterol Sulfate (Ventolin Hfa Inhaler -)  1 puff IH Q6H PRN


   PRN Reason: SHORTNESS OF BREATH


   Last Admin: 04/14/17 10:36 Dose:  1 puff


Albuterol Sulfate (Ventolin 0.083% Nebulizer Soln -)  1 amp NEB TIDR [facility]


   Last Admin: 04/16/17 06:30 Dose:  1 amp


Apixaban (Eliquis -)  5 mg PO BID [facility]


   Last Admin: 04/16/17 10:04 Dose:  5 mg


Docusate Sodium (Colace -)  300 mg PO HS [facility]


   Last Admin: 04/15/17 22:07 Dose:  300 mg


Emollient Ointment (Aquaphor -)  1 applic TP BID PRN


Fluticasone Propionate (Flonase -)  1 spray NS DAILY [facility]


   Last Admin: 04/16/17 10:04 Dose:  1 spray


Hydromorphone HCl (Dilaudid -)  8 mg PO Q8H PRN


   Last Admin: 04/14/17 05:50 Dose:  8 mg


Isosorbide Mononitrate (Imdur -)  30 mg PO DAILY [facility]


   Last Admin: 04/16/17 10:05 Dose:  30 mg


Lactulose (Cephulac (Oral Use))  20 gm PO DAILY PRN


   PRN Reason: CONSTIPATION


   Last Admin: 04/15/17 15:24 Dose:  20 gm


Morphine Sulfate (Ms Contin -)  30 mg PO BID [facility]


   Last Admin: 04/16/17 10:05 Dose:  30 mg


Nf Med(Align Probiotic Supplement )  1 each PO DAILY [facility]


   Last Admin: 04/16/17 10:06 Dose:  1 each


Nf Med(Simethicone (125mg))  1 each PO ACHS PRN


Nf Med(Simethicone (125mg))  2 each PO ACHS PRN


Nf Med (Linzess (290mcg))  1 each PO DAILY@0800 [facility]


   Last Admin: 04/16/17 08:40 Dose:  1 each


Nf Med(Pancreatin (1300))  1 each PO TIDCM [facility]


   Last Admin: 04/16/17 08:40 Dose:  1 each


Ondansetron HCl (Zofran Injection)  4 mg IVPB Q6H PRN


   PRN Reason: NAUSEA


Pantoprazole Sodium (Protonix -)  40 mg PO DAILY [facility]


   Last Admin: 04/16/17 10:05 Dose:  40 mg


Polyethylene Glycol (Miralax (For Daily Use) -)  17 gm PO DAILY [facility]


   Last Admin: 04/16/17 10:05 Dose:  Not Given


Ranitidine HCl (Zantac -)  150 mg PO BID [facility]


   Last Admin: 04/16/17 10:05 Dose:  150 mg


Ranolazine (Ranexa -)  500 mg PO BID [facility]


   Last Admin: 04/16/17 10:05 Dose:  500 mg


Senna (Senna -)  2 tab PO HS [facility]


   Last Admin: 04/15/17 22:08 Dose:  Not Given


Tamsulosin HCl (Flomax -)  0.4 mg PO DAILY@0830 [facility]


   Last Admin: 04/16/17 08:39 Dose:  0.4 mg


Thyroid ([place] Thyroid -)  60 mg PO DAILY@0700 [facility]


   Last Admin: 04/16/17 06:06 Dose:  60 mg


Tramadol HCl (Ultram -)  50 mg PO Q6H PRN


   Last Admin: 04/16/17 05:21 Dose:  50 mg


Zolpidem Tartrate (Ambien -)  5 mg PO HS PRN


   Last Admin: 04/15/17 22:08 Dose:  5 mg











- Objective


Vital Signs: 


 Vital Signs











Temperature  98.3 F   04/16/17 10:00


 


Pulse Rate  74   04/16/17 10:00


 


Respiratory Rate  18   04/16/17 10:00


 


Blood Pressure  104/54   04/16/17 10:00


 


O2 Sat by Pulse Oximetry (%)  96   04/16/17 05:41











Constitutional: Yes: No Distress, Calm


HENT: Yes: Atraumatic, Normocephalic


Cardiovascular: Yes: Regular Rate and Rhythm, S1, S2.  No: Murmur


Respiratory: Yes: Regular, CTA Bilaterally.  No: Rales, Rhonchi, Wheezes


Gastrointestinal: Yes: Normal Bowel Sounds, Soft, Tenderness (left lower 

quadrant)


Edema: Yes


Edema: LLE: Trace, RLE: Trace


Labs: 


 CBC, BMP





 04/15/17 07:30 





 04/15/17 07:30 





 INR, PTT











INR  1.30  (0.82-1.09)  H D 04/13/17  21:32    














Assessment/Plan


All Active Problems





Chronic abdominal pain (Acute) 


Chronic pain syndrome (Acute) 


Constipation (Acute) 


Dysphagia (Acute) 


GERD (gastroesophageal reflux disease) (Acute) 


Anxiety (Acute) 


COPD (chronic obstructive pulmonary disease) (Acute) 


CVA (cerebral vascular accident) (Acute) 


Factor V deficiency (Acute) 


HTN (hypertension) (Acute) 


Hypercholesteremia (Acute) 


Multiple contusions (Acute) 





-check CT head, given AC use and headache with previously unreported fall 

earlier this week


-to get gastric emptying study to further evaluate abd pain

## 2017-04-17 LAB
ALBUMIN SERPL-MCNC: 3 G/DL (ref 3.4–5)
ALP SERPL-CCNC: 51 U/L (ref 45–117)
ALT SERPL-CCNC: 14 U/L (ref 12–78)
AMYLASE SERPL-CCNC: 71 U/L (ref 25–115)
ANION GAP SERPL CALC-SCNC: 7 MMOL/L (ref 8–16)
APPEARANCE UR: CLEAR
AST SERPL-CCNC: 11 U/L (ref 15–37)
BASOPHILS # BLD: 1 % (ref 0–2)
BILIRUB SERPL-MCNC: 0.6 MG/DL (ref 0.2–1)
BILIRUB UR STRIP.AUTO-MCNC: NEGATIVE MG/DL
CALCIUM SERPL-MCNC: 8.5 MG/DL (ref 8.5–10.1)
CO2 SERPL-SCNC: 30 MMOL/L (ref 21–32)
COCKROFT - GAULT: 76.33
COLOR UR: (no result)
CREAT SERPL-MCNC: 1.2 MG/DL (ref 0.7–1.3)
DEPRECATED RDW RBC AUTO: 14.6 % (ref 11.9–15.9)
EOSINOPHIL # BLD: 8.4 % (ref 0–4.5)
GLUCOSE SERPL-MCNC: 93 MG/DL (ref 74–106)
KETONES UR QL STRIP: NEGATIVE
LEUKOCYTE ESTERASE UR QL STRIP.AUTO: NEGATIVE
MCH RBC QN AUTO: 31.2 PG (ref 25.7–33.7)
MCHC RBC AUTO-ENTMCNC: 33.1 G/DL (ref 32–35.9)
MCV RBC: 94.3 FL (ref 80–96)
NEUTROPHILS # BLD: 45.2 % (ref 42.8–82.8)
NITRITE UR QL STRIP: NEGATIVE
PH UR: 6 [PH] (ref 5–8)
PLATELET # BLD AUTO: 157 K/MM3 (ref 134–434)
PMV BLD: 7.5 FL (ref 7.5–11.1)
PROT SERPL-MCNC: 6 G/DL (ref 6.4–8.2)
PROT UR QL STRIP: NEGATIVE
PROT UR QL STRIP: NEGATIVE
RBC # UR STRIP: NEGATIVE /UL
SP GR UR: 1.01 (ref 1–1.03)
UROBILINOGEN UR STRIP-MCNC: NEGATIVE E.U./DL (ref 0.2–1)
WBC # BLD AUTO: 4.9 K/MM3 (ref 4–10)

## 2017-04-17 RX ADMIN — FLUTICASONE PROPIONATE SCH SPRAY: 50 SPRAY, METERED NASAL at 10:38

## 2017-04-17 RX ADMIN — ALBUTEROL SULFATE SCH AMP: 2.5 SOLUTION RESPIRATORY (INHALATION) at 22:35

## 2017-04-17 RX ADMIN — SENNOSIDES SCH TAB: 8.6 TABLET, FILM COATED ORAL at 21:49

## 2017-04-17 RX ADMIN — RANITIDINE SCH MG: 150 TABLET ORAL at 10:33

## 2017-04-17 RX ADMIN — RANOLAZINE SCH MG: 500 TABLET, FILM COATED, EXTENDED RELEASE ORAL at 10:32

## 2017-04-17 RX ADMIN — ALBUTEROL SULFATE SCH AMP: 2.5 SOLUTION RESPIRATORY (INHALATION) at 06:30

## 2017-04-17 RX ADMIN — Medication SCH MG: at 06:21

## 2017-04-17 RX ADMIN — APIXABAN SCH MG: 5 TABLET, FILM COATED ORAL at 21:47

## 2017-04-17 RX ADMIN — TAMSULOSIN HYDROCHLORIDE SCH: 0.4 CAPSULE ORAL at 08:30

## 2017-04-17 RX ADMIN — ALBUTEROL SULFATE SCH AMP: 2.5 SOLUTION RESPIRATORY (INHALATION) at 13:48

## 2017-04-17 RX ADMIN — DOCUSATE SODIUM SCH MG: 100 CAPSULE, LIQUID FILLED ORAL at 21:47

## 2017-04-17 RX ADMIN — PANTOPRAZOLE SODIUM SCH MG: 40 TABLET, DELAYED RELEASE ORAL at 10:32

## 2017-04-17 RX ADMIN — TAMSULOSIN HYDROCHLORIDE SCH MG: 0.4 CAPSULE ORAL at 10:37

## 2017-04-17 RX ADMIN — MORPHINE SULFATE SCH MG: 30 TABLET, EXTENDED RELEASE ORAL at 21:50

## 2017-04-17 RX ADMIN — MORPHINE SULFATE SCH MG: 30 TABLET, EXTENDED RELEASE ORAL at 10:32

## 2017-04-17 RX ADMIN — APIXABAN SCH MG: 5 TABLET, FILM COATED ORAL at 10:34

## 2017-04-17 RX ADMIN — POLYETHYLENE GLYCOL 3350 SCH GRAMS: 17 POWDER, FOR SOLUTION ORAL at 10:39

## 2017-04-17 RX ADMIN — RANITIDINE SCH MG: 150 TABLET ORAL at 21:50

## 2017-04-17 RX ADMIN — RANOLAZINE SCH MG: 500 TABLET, FILM COATED, EXTENDED RELEASE ORAL at 21:49

## 2017-04-17 RX ADMIN — ISOSORBIDE MONONITRATE SCH MG: 30 TABLET, EXTENDED RELEASE ORAL at 10:33

## 2017-04-17 NOTE — PN
Progress Note (short form)





- Note


Progress Note: 


GASTROENTEROLOGY





[facility] CAN NOT PERFORM THE GASTRIC EMPTYING SCAN TODAY.  THEY WILL TRY 

TOMORROW, CONTINUE SAME








HEATHER URBINA MD





Problem List





- Problems


(1) Constipation


Code(s): K59.00 - CONSTIPATION, UNSPECIFIED   





(2) GERD (gastroesophageal reflux disease)


Code(s): K21.9 - GASTRO-ESOPHAGEAL REFLUX DISEASE WITHOUT ESOPHAGITIS   





(3) Dysphagia


Code(s): R13.10 - DYSPHAGIA, UNSPECIFIED   





(4) Chronic abdominal pain


Code(s): R10.9 - UNSPECIFIED ABDOMINAL PAIN


G89.29 - OTHER CHRONIC PAIN





(5) Chronic pain syndrome


Code(s): G89.4 - CHRONIC PAIN SYNDROME





(6) Anxiety


Code(s): F41.9 - ANXIETY DISORDER, UNSPECIFIED





(7) COPD (chronic obstructive pulmonary disease)


Code(s): J44.9 - CHRONIC OBSTRUCTIVE PULMONARY DISEASE, UNSPECIFIED   





(8) CVA (cerebral vascular accident)


Code(s): I63.9 - CEREBRAL INFARCTION, UNSPECIFIED   





(9) Factor V deficiency


Code(s): D68.2 - HEREDITARY DEFICIENCY OF OTHER CLOTTING FACTORS





(10) Hypercholesteremia


Code(s): E78.0 - PURE HYPERCHOLESTEROLEMIA * DO NOT USE *

## 2017-04-17 NOTE — PN
Progress Note, Physician


Chief Complaint: 


Mr Oliva says he is still having abdominal pain. Also with burning on 

urination that he says happens off and on for months. Not short of breath. No 

nausea/vomiting and tolerating diet.





- Current Medication List


Current Medications: 


Active Medications





Acetaminophen (Tylenol -)  650 mg PO Q4H PRN


   PRN Reason: FEVER OR PAIN


Albuterol Sulfate (Ventolin Hfa Inhaler -)  1 puff IH Q6H PRN


   PRN Reason: SHORTNESS OF BREATH


   Last Admin: 04/14/17 10:36 Dose:  1 puff


Albuterol Sulfate (Ventolin 0.083% Nebulizer Soln -)  1 amp NEB TIDR [facility]


   Last Admin: 04/17/17 06:30 Dose:  1 amp


Apixaban (Eliquis -)  5 mg PO BID [facility]


   Last Admin: 04/17/17 10:34 Dose:  5 mg


Docusate Sodium (Colace -)  300 mg PO HS [facility]


   Last Admin: 04/16/17 21:40 Dose:  300 mg


Emollient Ointment (Aquaphor -)  1 applic TP BID PRN


Fluticasone Propionate (Flonase -)  1 spray NS DAILY [facility]


   Last Admin: 04/17/17 10:38 Dose:  1 spray


Hydromorphone HCl (Dilaudid -)  8 mg PO Q8H PRN


   Last Admin: 04/17/17 01:48 Dose:  8 mg


Isosorbide Mononitrate (Imdur -)  30 mg PO DAILY [facility]


   Last Admin: 04/17/17 10:33 Dose:  30 mg


Lactulose (Cephulac (Oral Use))  20 gm PO DAILY PRN


   PRN Reason: CONSTIPATION


   Last Admin: 04/15/17 15:24 Dose:  20 gm


Morphine Sulfate (Ms Contin -)  30 mg PO BID [facility]


   Last Admin: 04/17/17 10:32 Dose:  30 mg


Nf Med(Align Probiotic Supplement )  1 each PO DAILY [facility]


   Last Admin: 04/17/17 10:45 Dose:  Not Given


Nf Med(Simethicone (125mg))  1 each PO ACHS PRN


Nf Med(Simethicone (125mg))  2 each PO ACHS PRN


Nf Med (Linzess (290mcg))  1 each PO DAILY@0800 [facility]


   Last Admin: 04/17/17 09:27 Dose:  1 each


Nf Med(Pancreatin (1300))  1 each PO TIDCM [facility]


   Last Admin: 04/17/17 10:37 Dose:  1 each


Ondansetron HCl (Zofran Injection)  4 mg IVPB Q6H PRN


   PRN Reason: NAUSEA


Pantoprazole Sodium (Protonix -)  40 mg PO DAILY [facility]


   Last Admin: 04/17/17 10:32 Dose:  40 mg


Polyethylene Glycol (Miralax (For Daily Use) -)  17 gm PO DAILY [facility]


   Last Admin: 04/17/17 10:39 Dose:  17 grams


Ranitidine HCl (Zantac -)  150 mg PO BID [facility]


   Last Admin: 04/17/17 10:33 Dose:  150 mg


Ranolazine (Ranexa -)  500 mg PO BID [facility]


   Last Admin: 04/17/17 10:32 Dose:  500 mg


Senna (Senna -)  2 tab PO HS [facility]


   Last Admin: 04/16/17 21:41 Dose:  2 tab


Tamsulosin HCl (Flomax -)  0.4 mg PO DAILY@0830 [facility]


   Last Admin: 04/17/17 10:37 Dose:  0.4 mg


Thyroid ([place] Thyroid -)  60 mg PO DAILY@0700 [facility]


   Last Admin: 04/17/17 06:21 Dose:  60 mg


Tramadol HCl (Ultram -)  50 mg PO Q6H PRN


   Last Admin: 04/16/17 05:21 Dose:  50 mg


Zolpidem Tartrate (Ambien -)  5 mg PO HS PRN


   Last Admin: 04/15/17 22:08 Dose:  5 mg











- Objective


Vital Signs: 


 Vital Signs











Temperature  97.9 F   04/17/17 10:00


 


Pulse Rate  84   04/17/17 10:00


 


Respiratory Rate  18   04/17/17 10:00


 


Blood Pressure  104/62   04/17/17 10:00


 


O2 Sat by Pulse Oximetry (%)  95   04/17/17 09:00











Constitutional: Yes: No Distress, Calm, Obese


Cardiovascular: Yes: Regular Rate and Rhythm.  No: Gallop, Murmur, Rub


Respiratory: Yes: Regular, CTA Bilaterally.  No: Rales, Rhonchi, Wheezes


Gastrointestinal: Yes: Normal Bowel Sounds, Soft.  No: Distention, Tenderness


Extremities: Yes: WNL


Edema: No


Labs: 


 CBC, BMP





 04/17/17 06:15 





 04/17/17 06:15 





 INR, PTT











INR  1.30  (0.82-1.09)  H D 04/13/17  21:32    














Problem List





- Problems


(1) Chronic abdominal pain


Assessment/Plan: 


-appreciate GI assistance


-? gastroparesis


-plan for GES tomorrow


Code(s): R10.9 - UNSPECIFIED ABDOMINAL PAIN


G89.29 - OTHER CHRONIC PAIN





(2) GERD (gastroesophageal reflux disease)


Assessment/Plan: 


-continue protonix and sucralfate


Code(s): K21.9 - GASTRO-ESOPHAGEAL REFLUX DISEASE WITHOUT ESOPHAGITIS   





(3) CAD (coronary artery disease)


Assessment/Plan: 


-quiescent


-continue home regimen


Code(s): I25.10 - ATHSCL HEART DISEASE OF NATIVE CORONARY ARTERY W/O ANG PCTRS 

  





(4) COPD (chronic obstructive pulmonary disease)


Assessment/Plan: 


-not in exacerbation


Code(s): J44.9 - CHRONIC OBSTRUCTIVE PULMONARY DISEASE, UNSPECIFIED   





(5) CVA (cerebral vascular accident)


Assessment/Plan: 


-continue eliquis


Code(s): I63.9 - CEREBRAL INFARCTION, UNSPECIFIED   





(6) HTN (hypertension)


Assessment/Plan: 


-well controlled


Code(s): I10 - ESSENTIAL (PRIMARY) HYPERTENSION   Qualifiers: 


     Hypertension type: essential hypertension     Qualified Code(s): I10 - 

Essential (primary) hypertension  





(7) Factor V deficiency


Assessment/Plan: 


-continue eliquis


Code(s): D68.2 - HEREDITARY DEFICIENCY OF OTHER CLOTTING FACTORS





(8) Dysuria


Assessment/Plan: 


-check urinalysis


Code(s): R30.0 - DYSURIA

## 2017-04-18 LAB
ANION GAP SERPL CALC-SCNC: 5 MMOL/L (ref 8–16)
BASOPHILS # BLD: 1.1 % (ref 0–2)
CALCIUM SERPL-MCNC: 8.3 MG/DL (ref 8.5–10.1)
CO2 SERPL-SCNC: 28 MMOL/L (ref 21–32)
COCKROFT - GAULT: 76.33
CREAT SERPL-MCNC: 1.2 MG/DL (ref 0.7–1.3)
DEPRECATED RDW RBC AUTO: 14.8 % (ref 11.9–15.9)
EOSINOPHIL # BLD: 7.8 % (ref 0–4.5)
GLUCOSE SERPL-MCNC: 94 MG/DL (ref 74–106)
MAGNESIUM SERPL-MCNC: 2.4 MG/DL (ref 1.8–2.4)
MCH RBC QN AUTO: 31.8 PG (ref 25.7–33.7)
MCHC RBC AUTO-ENTMCNC: 33.9 G/DL (ref 32–35.9)
MCV RBC: 93.9 FL (ref 80–96)
NEUTROPHILS # BLD: 52.9 % (ref 42.8–82.8)
PHOSPHATE SERPL-MCNC: 3.7 MG/DL (ref 2.5–4.9)
PLATELET # BLD AUTO: 158 K/MM3 (ref 134–434)
PMV BLD: 7.6 FL (ref 7.5–11.1)
WBC # BLD AUTO: 5.5 K/MM3 (ref 4–10)

## 2017-04-18 PROCEDURE — 4A0B78Z MEASUREMENT OF GASTROINTESTINAL MOTILITY, VIA NATURAL OR ARTIFICIAL OPENING: ICD-10-PCS | Performed by: RADIOLOGY

## 2017-04-18 RX ADMIN — ZOLPIDEM TARTRATE PRN MG: 5 TABLET, COATED ORAL at 00:46

## 2017-04-18 RX ADMIN — SENNOSIDES SCH TAB: 8.6 TABLET, FILM COATED ORAL at 21:50

## 2017-04-18 RX ADMIN — ALBUTEROL SULFATE SCH: 2.5 SOLUTION RESPIRATORY (INHALATION) at 14:09

## 2017-04-18 RX ADMIN — Medication SCH MG: at 06:44

## 2017-04-18 RX ADMIN — MORPHINE SULFATE SCH MG: 30 TABLET, EXTENDED RELEASE ORAL at 10:29

## 2017-04-18 RX ADMIN — ALBUTEROL SULFATE SCH AMP: 2.5 SOLUTION RESPIRATORY (INHALATION) at 22:15

## 2017-04-18 RX ADMIN — RANOLAZINE SCH MG: 500 TABLET, FILM COATED, EXTENDED RELEASE ORAL at 10:30

## 2017-04-18 RX ADMIN — MORPHINE SULFATE SCH MG: 30 TABLET, EXTENDED RELEASE ORAL at 21:52

## 2017-04-18 RX ADMIN — PANTOPRAZOLE SODIUM SCH MG: 40 TABLET, DELAYED RELEASE ORAL at 10:30

## 2017-04-18 RX ADMIN — ZOLPIDEM TARTRATE PRN MG: 5 TABLET, COATED ORAL at 23:39

## 2017-04-18 RX ADMIN — FLUTICASONE PROPIONATE SCH SPRAY: 50 SPRAY, METERED NASAL at 10:30

## 2017-04-18 RX ADMIN — DOCUSATE SODIUM SCH MG: 100 CAPSULE, LIQUID FILLED ORAL at 21:51

## 2017-04-18 RX ADMIN — Medication SCH: at 06:45

## 2017-04-18 RX ADMIN — TAMSULOSIN HYDROCHLORIDE SCH MG: 0.4 CAPSULE ORAL at 08:04

## 2017-04-18 RX ADMIN — LACTULOSE SCH: 20 SOLUTION ORAL at 21:58

## 2017-04-18 RX ADMIN — APIXABAN SCH MG: 5 TABLET, FILM COATED ORAL at 10:31

## 2017-04-18 RX ADMIN — ALBUTEROL SULFATE SCH AMP: 2.5 SOLUTION RESPIRATORY (INHALATION) at 06:48

## 2017-04-18 RX ADMIN — RANOLAZINE SCH MG: 500 TABLET, FILM COATED, EXTENDED RELEASE ORAL at 21:52

## 2017-04-18 RX ADMIN — RANITIDINE SCH MG: 150 TABLET ORAL at 10:30

## 2017-04-18 RX ADMIN — APIXABAN SCH MG: 5 TABLET, FILM COATED ORAL at 21:50

## 2017-04-18 RX ADMIN — ALBUTEROL SULFATE SCH AMP: 2.5 SOLUTION RESPIRATORY (INHALATION) at 17:29

## 2017-04-18 RX ADMIN — RANITIDINE SCH MG: 150 TABLET ORAL at 21:50

## 2017-04-18 RX ADMIN — POLYETHYLENE GLYCOL 3350 SCH GRAMS: 17 POWDER, FOR SOLUTION ORAL at 10:32

## 2017-04-18 NOTE — PN
Progress Note, Physician


Chief Complaint: 


Mr Oliva complains of rectal pain today. No cp or sob. Cannot figure if he is 

still having abdominal pain as he mainly complains of rectal pain.





- Current Medication List


Current Medications: 


Active Medications





Acetaminophen (Tylenol -)  650 mg PO Q4H PRN


   PRN Reason: FEVER OR PAIN


Albuterol Sulfate (Ventolin Hfa Inhaler -)  1 puff IH Q6H PRN


   PRN Reason: SHORTNESS OF BREATH


   Last Admin: 04/14/17 10:36 Dose:  1 puff


Albuterol Sulfate (Ventolin 0.083% Nebulizer Soln -)  1 amp NEB TIDR [facility]


   Last Admin: 04/18/17 14:09 Dose:  Not Given


Apixaban (Eliquis -)  5 mg PO BID [facility]


   Last Admin: 04/18/17 10:31 Dose:  5 mg


Docusate Sodium (Colace -)  300 mg PO HS [facility]


   Last Admin: 04/17/17 21:47 Dose:  300 mg


Emollient Ointment (Aquaphor -)  1 applic TP BID PRN


Fluticasone Propionate (Flonase -)  1 spray NS DAILY [facility]


   Last Admin: 04/18/17 10:30 Dose:  1 spray


Hydromorphone HCl (Dilaudid -)  8 mg PO Q8H PRN


   Last Admin: 04/17/17 15:55 Dose:  8 mg


Isosorbide Mononitrate (Imdur -)  30 mg PO DAILY [facility]


   Last Admin: 04/17/17 10:33 Dose:  30 mg


Lactulose (Cephulac (Oral Use))  20 gm PO DAILY PRN


   PRN Reason: CONSTIPATION


   Last Admin: 04/15/17 15:24 Dose:  20 gm


Morphine Sulfate (Ms Contin -)  30 mg PO BID [facility]


   Last Admin: 04/18/17 10:29 Dose:  30 mg


Nf Med(Align Probiotic Supplement )  1 each PO DAILY [facility]


   Last Admin: 04/18/17 10:40 Dose:  1 each


Nf Med(Simethicone (125mg))  1 each PO ACHS PRN


Nf Med(Simethicone (125mg))  2 each PO ACHS PRN


Nf Med (Linzess (290mcg))  1 each PO DAILY@0800 [facility]


   Last Admin: 04/18/17 08:05 Dose:  1 each


Nf Med(Pancreatin (1300))  1 each PO TIDCM [facility]


   Last Admin: 04/18/17 12:29 Dose:  Not Given


Ondansetron HCl (Zofran Injection)  4 mg IVPB Q6H PRN


   PRN Reason: NAUSEA


Pantoprazole Sodium (Protonix -)  40 mg PO DAILY [facility]


   Last Admin: 04/18/17 10:30 Dose:  40 mg


Polyethylene Glycol (Miralax (For Daily Use) -)  17 gm PO DAILY [facility]


   Last Admin: 04/18/17 10:32 Dose:  17 grams


Ranitidine HCl (Zantac -)  150 mg PO BID [facility]


   Last Admin: 04/18/17 10:30 Dose:  150 mg


Ranolazine (Ranexa -)  500 mg PO BID [facility]


   Last Admin: 04/18/17 10:30 Dose:  500 mg


Senna (Senna -)  2 tab PO HS [facility]


   Last Admin: 04/17/17 21:49 Dose:  2 tab


Tamsulosin HCl (Flomax -)  0.4 mg PO DAILY@0830 [facility]


   Last Admin: 04/18/17 08:04 Dose:  0.4 mg


Thyroid ([place] Thyroid -)  60 mg PO DAILY@0700 [facility]


   Last Admin: 04/18/17 06:45 Dose:  Not Given


Tramadol HCl (Ultram -)  50 mg PO Q6H PRN


   Last Admin: 04/16/17 05:21 Dose:  50 mg


Zolpidem Tartrate (Ambien -)  5 mg PO HS PRN


   Last Admin: 04/18/17 00:46 Dose:  5 mg











- Objective


Vital Signs: 


 Vital Signs











Temperature  97.5 F L  04/18/17 14:45


 


Pulse Rate  64   04/18/17 14:45


 


Respiratory Rate  22   04/18/17 14:45


 


Blood Pressure  137/82   04/18/17 14:45


 


O2 Sat by Pulse Oximetry (%)  95   04/18/17 09:00











Constitutional: Yes: Well Nourished, No Distress, Calm


Cardiovascular: Yes: Regular Rate and Rhythm.  No: Gallop, Murmur, Rub


Respiratory: Yes: Regular, CTA Bilaterally.  No: Rales, Rhonchi, Wheezes


Gastrointestinal: Yes: Normal Bowel Sounds, Soft.  No: Distention, Tenderness


Extremities: Yes: WNL


Edema: No


Labs: 


 CBC, BMP





 04/18/17 06:20 





 04/18/17 06:20 





 INR, PTT











INR  1.30  (0.82-1.09)  H D 04/13/17  21:32    














Problem List





- Problems


(1) Chronic abdominal pain


Code(s): R10.9 - UNSPECIFIED ABDOMINAL PAIN


G89.29 - OTHER CHRONIC PAIN





(2) GERD (gastroesophageal reflux disease)


Code(s): K21.9 - GASTRO-ESOPHAGEAL REFLUX DISEASE WITHOUT ESOPHAGITIS   





(3) CAD (coronary artery disease)


Code(s): I25.10 - ATHSCL HEART DISEASE OF NATIVE CORONARY ARTERY W/O ANG PCTRS 

  





(4) COPD (chronic obstructive pulmonary disease)


Code(s): J44.9 - CHRONIC OBSTRUCTIVE PULMONARY DISEASE, UNSPECIFIED   





(5) CVA (cerebral vascular accident)


Code(s): I63.9 - CEREBRAL INFARCTION, UNSPECIFIED   





(6) HTN (hypertension)


Code(s): I10 - ESSENTIAL (PRIMARY) HYPERTENSION   Qualifiers: 


     Hypertension type: essential hypertension     Qualified Code(s): I10 - 

Essential (primary) hypertension  





(7) Factor V deficiency


Code(s): D68.2 - HEREDITARY DEFICIENCY OF OTHER CLOTTING FACTORS





(8) Dysuria


Code(s): R30.0 - DYSURIA








Assessment/Plan


(1) Chronic abdominal pain


Assessment/Plan: 


-appreciate GI assistance


-? gastroparesis


-awaiting GES


-if performed today, plan for discharge tomorrow


-schedule lactulose


Code(s): R10.9 - UNSPECIFIED ABDOMINAL PAIN


G89.29 - OTHER CHRONIC PAIN





(2) GERD (gastroesophageal reflux disease)


Assessment/Plan: 


-continue protonix and sucralfate


Code(s): K21.9 - GASTRO-ESOPHAGEAL REFLUX DISEASE WITHOUT ESOPHAGITIS   





(3) CAD (coronary artery disease)


Assessment/Plan: 


-quiescent


-continue home regimen


Code(s): I25.10 - ATHSCL HEART DISEASE OF NATIVE CORONARY ARTERY W/O ANG PCTRS 

  





(4) COPD (chronic obstructive pulmonary disease)


Assessment/Plan: 


-not in exacerbation


Code(s): J44.9 - CHRONIC OBSTRUCTIVE PULMONARY DISEASE, UNSPECIFIED   





(5) CVA (cerebral vascular accident)


Assessment/Plan: 


-continue eliquis


Code(s): I63.9 - CEREBRAL INFARCTION, UNSPECIFIED   





(6) HTN (hypertension)


Assessment/Plan: 


-well controlled


Code(s): I10 - ESSENTIAL (PRIMARY) HYPERTENSION   Qualifiers: 


     Hypertension type: essential hypertension     Qualified Code(s): I10 - 

Essential (primary) hypertension  





(7) Factor V deficiency


Assessment/Plan: 


-continue eliquis


Code(s): D68.2 - HEREDITARY DEFICIENCY OF OTHER CLOTTING FACTORS





(8) Dysuria


Assessment/Plan: 


-urinalysis normal


Code(s): R30.0 - DYSURIA

## 2017-04-19 VITALS — HEART RATE: 78 BPM | DIASTOLIC BLOOD PRESSURE: 61 MMHG | TEMPERATURE: 98.5 F | SYSTOLIC BLOOD PRESSURE: 128 MMHG

## 2017-04-19 RX ADMIN — PANTOPRAZOLE SODIUM SCH MG: 40 TABLET, DELAYED RELEASE ORAL at 10:31

## 2017-04-19 RX ADMIN — TAMSULOSIN HYDROCHLORIDE SCH MG: 0.4 CAPSULE ORAL at 10:27

## 2017-04-19 RX ADMIN — POLYETHYLENE GLYCOL 3350 SCH GRAMS: 17 POWDER, FOR SOLUTION ORAL at 10:30

## 2017-04-19 RX ADMIN — Medication SCH MG: at 06:10

## 2017-04-19 RX ADMIN — ALBUTEROL SULFATE SCH AMP: 2.5 SOLUTION RESPIRATORY (INHALATION) at 14:46

## 2017-04-19 RX ADMIN — RANITIDINE SCH MG: 150 TABLET ORAL at 10:32

## 2017-04-19 RX ADMIN — RANOLAZINE SCH MG: 500 TABLET, FILM COATED, EXTENDED RELEASE ORAL at 10:32

## 2017-04-19 RX ADMIN — LACTULOSE SCH: 20 SOLUTION ORAL at 10:28

## 2017-04-19 RX ADMIN — MORPHINE SULFATE SCH MG: 30 TABLET, EXTENDED RELEASE ORAL at 10:24

## 2017-04-19 RX ADMIN — ALBUTEROL SULFATE SCH: 2.5 SOLUTION RESPIRATORY (INHALATION) at 06:45

## 2017-04-19 RX ADMIN — APIXABAN SCH MG: 5 TABLET, FILM COATED ORAL at 10:29

## 2017-04-19 RX ADMIN — FLUTICASONE PROPIONATE SCH SPRAY: 50 SPRAY, METERED NASAL at 10:30

## 2017-04-19 NOTE — PN
Progress Note (short form)





- Note


Progress Note: 


GASTROENTEROLOGY





PATIENT WAITING FOR DELAYED PERFORMANCE AND READING OF A GASTRIC EMPTYING SCAN.

  AS OF TODAY THE SCAN HAS NOT BEEN READ AND THE PATIENT'S PMD  IS READY TO 

DISCHARGE THE PATIENT.  I HAVE NO OBJECTION TO DISCHARGE.  PATIENT CAN CALL MY 

OFFICE FOR FOLLOW UP VISIT.  I SPOKE WITH HIS NURSE TODAY AND INFORMED HER OF 

THIS.





HEATHER URBINA MD





Problem List





- Problems


(1) Constipation


Code(s): K59.00 - CONSTIPATION, UNSPECIFIED   





(2) GERD (gastroesophageal reflux disease)


Code(s): K21.9 - GASTRO-ESOPHAGEAL REFLUX DISEASE WITHOUT ESOPHAGITIS   





(3) Dysphagia


Code(s): R13.10 - DYSPHAGIA, UNSPECIFIED   





(4) Chronic abdominal pain


Code(s): R10.9 - UNSPECIFIED ABDOMINAL PAIN


G89.29 - OTHER CHRONIC PAIN





(5) Chronic pain syndrome


Code(s): G89.4 - CHRONIC PAIN SYNDROME





(6) Anxiety


Code(s): F41.9 - ANXIETY DISORDER, UNSPECIFIED





(7) COPD (chronic obstructive pulmonary disease)


Code(s): J44.9 - CHRONIC OBSTRUCTIVE PULMONARY DISEASE, UNSPECIFIED   





(8) CVA (cerebral vascular accident)


Code(s): I63.9 - CEREBRAL INFARCTION, UNSPECIFIED   





(9) Factor V deficiency


Code(s): D68.2 - HEREDITARY DEFICIENCY OF OTHER CLOTTING FACTORS





(10) Hypercholesteremia


Code(s): E78.0 - PURE HYPERCHOLESTEROLEMIA * DO NOT USE *

## 2017-04-19 NOTE — DS
Physical Examination


Vital Signs: 


 Vital Signs











Temperature  98.4 F   04/19/17 10:35


 


Pulse Rate  86   04/19/17 10:35


 


Respiratory Rate  20   04/19/17 10:35


 


Blood Pressure  123/66   04/19/17 10:35


 


O2 Sat by Pulse Oximetry (%)  94 L  04/18/17 21:00











Constitutional: Yes: Well Nourished, No Distress, Calm


Cardiovascular: Yes: Regular Rate and Rhythm.  No: Gallop, Murmur, Rub


Respiratory: Yes: Regular, CTA Bilaterally.  No: Rales, Rhonchi, Wheezes


Gastrointestinal: Yes: Normal Bowel Sounds, Soft.  No: Distention, Tenderness


Extremities: Yes: WNL


Edema: No


Labs: 


 CBC, BMP





 04/18/17 06:20 





 04/18/17 06:20 











Discharge Summary


Reason For Visit: CHRONIC PAIN SYNDROME


Current Active Problems





Chronic abdominal pain (Acute) 


Chronic pain syndrome (Acute) 


Constipation (Acute) 


Dysphagia (Acute) 


Dysuria (Acute) 


GERD (gastroesophageal reflux disease) (Acute) 








Hospital Course: 


(1) Chronic abdominal pain


Code(s): R10.9 - UNSPECIFIED ABDOMINAL PAIN


G89.29 - OTHER CHRONIC PAIN





(2) GERD (gastroesophageal reflux disease)


Code(s): K21.9 - GASTRO-ESOPHAGEAL REFLUX DISEASE WITHOUT ESOPHAGITIS   





(3) CAD (coronary artery disease)


Code(s): I25.10 - ATHSCL HEART DISEASE OF NATIVE CORONARY ARTERY W/O ANG PCTRS 

  





(4) COPD (chronic obstructive pulmonary disease)


Code(s): J44.9 - CHRONIC OBSTRUCTIVE PULMONARY DISEASE, UNSPECIFIED   





(5) CVA (cerebral vascular accident)


Code(s): I63.9 - CEREBRAL INFARCTION, UNSPECIFIED   





(6) HTN (hypertension)


Code(s): I10 - ESSENTIAL (PRIMARY) HYPERTENSION   Qualifiers: 


     Hypertension type: essential hypertension     Qualified Code(s): I10 - 

Essential (primary) hypertension  





(7) Factor V deficiency


Code(s): D68.2 - HEREDITARY DEFICIENCY OF OTHER CLOTTING FACTORS





(8) Dysuria


Code(s): R30.0 - DYSURIA





Mr Oliva is a 77 year old male with chronic abdominal pain who presents with 

worsening pain. He was seen by GI and underwent CT scan which was normal accept 

for constipation. He also underwent GES which showed mild gastroparesis. He is 

to follow up with GI. No change in his current medications.





34 minutes spent in preparation of this discharge


Condition: Stable





- Instructions


Diet, Activity, Other Instructions: 


resume previous diet and activity


Referrals: 


Benjie Meza MD [Primary Care Provider] - 


Bulmaro Cohen MD [Staff Physician] - 


Disposition: [facility]/HOME HEALTH CARE





- Home Medications


Comprehensive Discharge Medication List: 


Ambulatory Orders





Hydromorphone HCl [Dilaudid] 8 mg PO TID PRN 08/26/16 


Linaclotide [Linzess] 290 mcg PO DAILY 08/26/16 


Thyroid [[place] Thyroid] 60 mg PO DAILY 08/26/16 


Zolpidem Tartrate [Ambien] 10 mg PO HS PRN 08/26/16 


Apixaban [Eliquis] 5 mg PO BID 04/13/17 


Omeprazole 40 mg PO DAILY 04/13/17 


Tamsulosin HCl 0.4 mg PO DAILY 04/13/17 


Morphine Sulfate 30 mg PO BID 04/14/17 


Ranolazine [Ranexa] 500 mg PO BID 04/14/17

## 2017-12-04 ENCOUNTER — HOSPITAL ENCOUNTER (OUTPATIENT)
Dept: HOSPITAL 74 - JER | Age: 77
Setting detail: OBSERVATION
LOS: 2 days | Discharge: NURSING, REHAB OR LONG-TERM CARE | End: 2017-12-06
Attending: INTERNAL MEDICINE | Admitting: INTERNAL MEDICINE
Payer: COMMERCIAL

## 2017-12-04 VITALS — BODY MASS INDEX: 32.1 KG/M2

## 2017-12-04 DIAGNOSIS — J44.9: ICD-10-CM

## 2017-12-04 DIAGNOSIS — Z79.01: ICD-10-CM

## 2017-12-04 DIAGNOSIS — Z85.46: ICD-10-CM

## 2017-12-04 DIAGNOSIS — K21.9: ICD-10-CM

## 2017-12-04 DIAGNOSIS — I25.10: ICD-10-CM

## 2017-12-04 DIAGNOSIS — Z99.81: ICD-10-CM

## 2017-12-04 DIAGNOSIS — R07.9: Primary | ICD-10-CM

## 2017-12-04 DIAGNOSIS — E78.5: ICD-10-CM

## 2017-12-04 DIAGNOSIS — Z95.5: ICD-10-CM

## 2017-12-04 DIAGNOSIS — Z86.711: ICD-10-CM

## 2017-12-04 DIAGNOSIS — Z95.828: ICD-10-CM

## 2017-12-04 DIAGNOSIS — Z86.718: ICD-10-CM

## 2017-12-04 DIAGNOSIS — N40.0: ICD-10-CM

## 2017-12-04 DIAGNOSIS — D68.51: ICD-10-CM

## 2017-12-04 DIAGNOSIS — F41.9: ICD-10-CM

## 2017-12-04 DIAGNOSIS — I10: ICD-10-CM

## 2017-12-04 DIAGNOSIS — D68.2: ICD-10-CM

## 2017-12-04 LAB
ALBUMIN SERPL-MCNC: 3.6 G/DL (ref 3.4–5)
ALP SERPL-CCNC: 69 U/L (ref 45–117)
ALT SERPL-CCNC: 25 U/L (ref 12–78)
ANION GAP SERPL CALC-SCNC: 8 MMOL/L (ref 8–16)
AST SERPL-CCNC: 13 U/L (ref 15–37)
BASOPHILS # BLD: 0.6 % (ref 0–2)
BILIRUB SERPL-MCNC: 0.5 MG/DL (ref 0.2–1)
CALCIUM SERPL-MCNC: 9.2 MG/DL (ref 8.5–10.1)
CK SERPL-CCNC: 49 IU/L (ref 39–308)
CO2 SERPL-SCNC: 31 MMOL/L (ref 21–32)
CREAT SERPL-MCNC: 1.2 MG/DL (ref 0.7–1.3)
DEPRECATED RDW RBC AUTO: 14 % (ref 11.9–15.9)
EOSINOPHIL # BLD: 3.7 % (ref 0–4.5)
GLUCOSE SERPL-MCNC: 97 MG/DL (ref 74–106)
INR BLD: 1.38 (ref 0.82–1.09)
MCH RBC QN AUTO: 32.3 PG (ref 25.7–33.7)
MCHC RBC AUTO-ENTMCNC: 34 G/DL (ref 32–35.9)
MCV RBC: 95 FL (ref 80–96)
NEUTROPHILS # BLD: 68.8 % (ref 42.8–82.8)
PLATELET # BLD AUTO: 165 K/MM3 (ref 134–434)
PMV BLD: 7.6 FL (ref 7.5–11.1)
PROT SERPL-MCNC: 6.7 G/DL (ref 6.4–8.2)
PT PNL PPP: 15.6 SEC (ref 9.98–11.88)
TROPONIN I SERPL-MCNC: < 0.02 NG/ML (ref 0–0.05)
WBC # BLD AUTO: 6.4 K/MM3 (ref 4–10)

## 2017-12-04 PROCEDURE — 3E0F7GC INTRODUCTION OF OTHER THERAPEUTIC SUBSTANCE INTO RESPIRATORY TRACT, VIA NATURAL OR ARTIFICIAL OPENING: ICD-10-PCS | Performed by: INTERNAL MEDICINE

## 2017-12-04 PROCEDURE — 3E033GC INTRODUCTION OF OTHER THERAPEUTIC SUBSTANCE INTO PERIPHERAL VEIN, PERCUTANEOUS APPROACH: ICD-10-PCS | Performed by: INTERNAL MEDICINE

## 2017-12-04 PROCEDURE — G0378 HOSPITAL OBSERVATION PER HR: HCPCS

## 2017-12-04 PROCEDURE — 3E033NZ INTRODUCTION OF ANALGESICS, HYPNOTICS, SEDATIVES INTO PERIPHERAL VEIN, PERCUTANEOUS APPROACH: ICD-10-PCS | Performed by: INTERNAL MEDICINE

## 2017-12-04 RX ADMIN — IPRATROPIUM BROMIDE AND ALBUTEROL SULFATE SCH AMP: .5; 3 SOLUTION RESPIRATORY (INHALATION) at 22:15

## 2017-12-04 NOTE — HP
CHIEF COMPLAINT: JAMARCUS field, CP





PCP: Derrick





HISTORY OF PRESENT ILLNESS:


This is a 77 year old male with a significant PMH CAD, MI, COPD who presented 

to the ED with a 2-3 day history of productive cough, SOB and chest pain. Pt 

also reports increased leg edema from usual. He dneies any palpitations. He has 

difficulty expressing himself at times due to previous CVA. He denies abdominal 

pain, nausea, vomiting, diarrhea. Reports constipation at times.





ER course was notable for:


(1) CXR unchanged from previous


(2) troponin neg x 1


(3) WBC 6.4





Recent Travel:


pt denies





PAST MEDICAL HISTORY:


CVA with L lulu, CAD s/p stents, DVT, HTN, HLD, MI, COPD, GERD, BPH, Prostate CA

, kidney stones, Factor 5 clotting d/o, anxiety, OA, chronic pain syndrome


PAST SURGICAL HISTORY:


stent x 2: distal RCA & obtuse marginal


prostate seed implant





Social History: retired 


Smoking: pt denies


Alcohol: pt denies


Drugs: pt denies





Family History:


mother and father  in their 80s due to heart disease


sister with DVT during pregnancy





Allergies


No Known Allergies Allergy (Verified 17 16:29)


 


HOME MEDICATIONS:


 3





 Medication  Instructions  Recorded


 


Hydromorphone HCl [Dilaudid] 8 mg PO TID PRN 16


 


Linaclotide [Linzess] 290 mcg PO DAILY 16


 


Thyroid [Benge Thyroid] 60 mg PO DAILY 16


 


Zolpidem Tartrate [Ambien] 10 mg PO HS PRN 16


 


Apixaban [Eliquis] 5 mg PO BID 17


 


Omeprazole 40 mg PO DAILY 17


 


Tamsulosin HCl 0.4 mg PO DAILY 17


 


Morphine Sulfate 30 mg PO BID 17


 


Ranolazine [Ranexa] 500 mg PO BID 17








REVIEW OF SYSTEMS


CONSTITUTIONAL: 


Absent:  fever, chills, diaphoresis, generalized weakness, malaise, loss of 

appetite, weight change


HEENT: 


Absent:  rhinorrhea, nasal congestion, throat pain, throat swelling, difficulty 

swallowing, mouth swelling, ear pain, eye pain, visual changes


CARDIOVASCULAR: Present: chest pain


Absent: syncope, palpitations, irregular heart rate, lightheadedness, 

peripheral edema


RESPIRATORY: Present: cough, shortness of breath


Absent: dyspnea with exertion, orthopnea, wheezing, stridor, hemoptysis


GASTROINTESTINAL:


Absent: abdominal pain, abdominal distension, nausea, vomiting, diarrhea, 

constipation, melena, hematochezia


GENITOURINARY: 


Absent: dysuria, frequency, urgency, hesitancy, hematuria, flank pain, genital 

pain


MUSCULOSKELETAL: 


Absent: myalgia, arthralgia, joint swelling, back pain, neck pain


SKIN: 


Absent: rash, itching, pallor


HEMATOLOGIC/IMMUNOLOGIC: 


Absent: easy bleeding, easy bruising, lymphadenopathy, frequent infections


ENDOCRINE:


Absent: unexplained weight gain, unexplained weight loss, heat intolerance, 

cold intolerance


NEUROLOGIC: 


Absent: headache, focal weakness or paresthesias, dizziness, unsteady gait, 

seizure, mental status changes, bladder or bowel incontinence


PSYCHIATRIC: 


Absent: anxiety, depression, suicidal or homicidal ideation, hallucinations.








PHYSICAL EXAMINATION


Vital Signs - 24 hr





 3





  17





  16:29 17:42 17:55


 


Temperature 97.4 F L  


 


Pulse Rate 90  


 


Pulse Rate [  78 75





Apical]   


 


Respiratory 22 18 17





Rate   


 


Blood Pressure 140/72  


 


Blood Pressure  119/74 118/72





[Left Arm]   


 


O2 Sat by Pulse 96 96 100





Oximetry (%)   








GENERAL: Awake, alert, and fully oriented, in no acute distress.


HEAD: Normal with no signs of trauma.


EYES: Pupils equal, round and reactive to light, extraocular movements intact, 

sclera anicteric, conjunctiva clear. No lid lag.


EARS, NOSE, THROAT: Ears normal, nares patent, oropharynx clear without 

exudates. Moist mucous membranes.


NECK: Normal range of motion, supple without lymphadenopathy, JVD, or masses.


LUNGS: Breath sounds equal, Crackles bilateral bases. No wheezes, and no 

rhonchi. No accessory muscle use.


HEART: Regular rate and rhythm, normal S1 and S2 without murmur, rub or gallop.


ABDOMEN: Soft, nontender, not distended, normoactive bowel sounds, no guarding, 

no rebound, no masses.  No hepatomegaly or  splenomegaly. 


MUSCULOSKELETAL: Normal range of motion at all joints. No bony deformities or 

tenderness. No CVA tenderness.


UPPER EXTREMITIES: 2+ pulses, warm, well-perfused. No cyanosis. No clubbing. No 

peripheral edema.


LOWER EXTREMITIES: 2+ pulses, warm, well-perfused. No calf tenderness. tr 

peripheral edema. 


NEUROLOGICAL:  Cranial nerves II-XII intact. Normal speech. Normal gait.


PSYCHIATRIC: Cooperative. Good eye contact. Appropriate mood and affect.


SKIN: Warm, dry, normal turgor, no rashes or lesions noted, normal capillary 

refill. 


 


Laboratory Results - last 24 hr





 3





  17





  16:11 16:11 16:11


 


WBC  6.4  


 


RBC  4.62  


 


Hgb  14.9  


 


Hct  43.9  


 


MCV  95.0  


 


MCH  32.3  


 


MCHC  34.0  


 


RDW  14.0  


 


Plt Count  165  


 


MPV  7.6  


 


Neutrophils %  68.8  D  


 


Lymphocytes %  17.6  D  


 


Monocytes %  9.3  


 


Eosinophils %  3.7  


 


Basophils %  0.6  


 


PT with INR   15.60 H 


 


INR   1.38 H 


 


Sodium    139


 


Potassium    4.7


 


Chloride    100


 


Carbon Dioxide    31


 


Anion Gap    8


 


BUN    17


 


Creatinine    1.2


 


Creat Clearance w eGFR    58.71


 


Random Glucose    97


 


Calcium    9.2


 


Total Bilirubin    0.5  D


 


AST    13 L


 


ALT    25


 


Alkaline Phosphatase    69


 


Creatine Kinase    49


 


Troponin I    < 0.02


 


Total Protein    6.7


 


Albumin    3.6


 


Blood Type   B POSITIVE 


 


Antibody Screen   Negative 








ECG


Normal sinus rhythm


vent rate 79, , prolonged


flattened T waves 3, aVF, V4 inverted V2-V3





Radiology Reports


Portable chest x-ray, AP sitting. 


Since 2016, the cardiac silhouette remains slightly enlarged with mild 

unfolding of the aortic arch. Bilateral increased lung markings and mild 

bibasal atelectatic changes are again seen. Mediastinum and visualized osseous 

structures appear intact 


Impression No significant interval change. Bilateral increased interstitial 

lung markings and mild bibasal atelectatic changes are again seen


Reported By: Dada Sultana MD 17 








ASSESSMENT/PLAN:


77yM with PMH CVA with L lulu, CAD s/p stents, DVT, HTN, HLD, MI, COPD, GERD, 

BPH, Prostate CA, kidney stones, Factor 5 clotting d/o, anxiety, OA, chronic 

pain syndrome presented to the ED with increased cough, SOB, chest pain.





Chest pain/SOB


   - given h/o CAD, need to r/o acute ischemic event


   - troponin neg x 1, cont to trend


   - cardiac monitoring


   - cardiology consult


   - lasix 40mg x 1 given bibasilar crackles


   - cont home ranexa for CAD





COPD exac


   - duonebs qid standing


   - prednisone given in ED but given pt not actively wheezing will defer 

further administration


   - consider azithromycin if not improving with nebs





HTN/HLD


   - on no antihypertensives or statin, f/u with PCP regarding same.





constipation


   - cont linzess





GERD


   - home omeprazole changed to formulary protonix





DVT PPX


   - on eliquis 





FEN


   - defer IVF


   - BMP in am


   - low sodium diet in am





Dispo: pt currently requires observation for cardiac monitoring.











Visit type





- Emergency Visit


Emergency Visit: Yes


ED Registration Date: 17


Care time: The patient presented to the Emergency Department on the above date 

and was hospitalized for further evaluation of their emergent condition.





- New Patient


This patient is new to me today: Yes


Date on this admission: 17





- Critical Care


Critical Care patient: No

## 2017-12-04 NOTE — PDOC
Rapid Medical Evaluation


Time Seen by Provider: 12/04/17 16:28


Medical Evaluation: 


 Allergies











Allergy/AdvReac Type Severity Reaction Status Date / Time


 


No Known Allergies Allergy   Verified 04/13/17 21:04











12/04/17 16:28


I have performed a brief in person evaluation of this patient. 


The patient presents with chief complaint of : chest pain and shortness of 

breath  started this morning , has lung disease , coronary stents


Pertinent PE findings: dyspnea , midsternal chest discomfort and short of 

breath 


I have ordered the following: ekg., labs 


The patient will proceed to the ER for further evaluation.

## 2017-12-04 NOTE — PDOC
History of Present Illness





- General


History Source: Patient


Exam Limitations: No Limitations





<Gi Tyson - Last Filed: 12/04/17 20:53>





<AustynEri Karine - Last Filed: 12/06/17 02:03>





- General


Chief Complaint: Chest Pain


Stated Complaint: CHEST PAIN


Time Seen by Provider: 12/04/17 16:28





- History of Present Illness


Initial Comments: 





12/04/17 20:50


The patient is a 77 year old male, with a significant past medical history of 

CVA (with residual left sided weakness), CAD (multiple stents), Deep Vein 

Thrombosis (on Eliquis), HTN, Hyperlipdemia, MI,COPD, GERD, BPH, Cancer (

Prostate), Factor 5 Clotting Disorder, Anxiety, Osteoarthritis who presents to 

the emergency department with 2-3 days of increased cough, SOB and chest pain. 

Patient reports productive cough with increased phlegm production. Patient also 

reports midsternal chest pain, radiating to L shoulder, 6/10 in severity. 

Patient took nebs this morning however denies any relief and reports to the ED 

for further evaluation. 





Patient denies headache or dizziness. Patient denies fever, chills, abdominal 

pain, nausea, vomit, diarrhea or constipation. Patient denies dysuria, frequency

, urgency or hematuria. Patient denies sick contacts or recent travel. 





Allergies: NKDA


Social History: No alcohol, tobacco, or drug use reported. 


Past Surgical History: s/p cardiac stents x2


Social Hx: Fire worker 


PCP: Dr. Benjie Meza


Cardio: Dr. Bg Joy 





 (Gi Tyson)





Past History





<Gi Tyson - Last Filed: 12/04/17 20:53>





- Past Medical History


Anemia: No


Asthma: No


Cancer: Yes (PROSTATE)


Cardiac Disorders: Yes (STENTS, FACTOR 5, CLOTTING DISORDER)


CVA: Yes (X2 '93 / '04 / (L) WEAKNESS)


COPD: Yes


CHF: No


Dementia: No


Diabetes: No


GI Disorders: Yes (diverticulosis,gerd)


 Disorders: Yes (kidney stone)


HTN: Yes


Hypercholesterolemia: Yes


Kidney Stones: Yes


Liver Disease: No


Seizures: No


Thyroid Disease: No





- Surgical History


Abdominal Surgery: No


Appendectomy: No


Cardiac Surgery: Yes (2 STENTS, SUJIT FILTER)


Cholecystectomy: No


Lung Surgery: No


Neurologic Surgery: No





- Suicide/Smoking/Psychosocial Hx


Smoking Status: No


Smoking History: Never smoked


Have you smoked in the past 12 months: No


Number of Cigarettes Smoked Daily: 0


Hx Alcohol Use: Yes


Drug/Substance Use Hx: No


Substance Use Type: None


Hx Substance Use Treatment: No





<Eri Zaman - Last Filed: 12/06/17 02:03>





- Past Medical History


Allergies/Adverse Reactions: 


 Allergies











Allergy/AdvReac Type Severity Reaction Status Date / Time


 


No Known Allergies Allergy   Verified 12/04/17 16:29











Home Medications: 


Ambulatory Orders





Hydromorphone HCl [Dilaudid] 8 mg PO TID PRN 08/26/16 


Linaclotide [Linzess] 290 mcg PO DAILY 08/26/16 


Thyroid [Viola Thyroid] 60 mg PO DAILY 08/26/16 


Zolpidem Tartrate [Ambien] 10 mg PO HS PRN 08/26/16 


Apixaban [Eliquis] 5 mg PO BID 04/13/17 


Omeprazole 40 mg PO DAILY 04/13/17 


Tamsulosin HCl 0.4 mg PO DAILY 04/13/17 


Ranolazine [Ranexa] 500 mg PO BID 04/14/17 


Morphine *Sr* [Ms Contin -] 30 mg PO Q12H 12/04/17 











Cardiac Specific PMH





- Complaint Specific PMHX


Angina: No


Cardiac Arrhythmia: No


GERD: No


Pacemaker: No


Peripheral Vascular Disease: No





<Eri Zaman - Last Filed: 12/06/17 02:03>





**Review of Systems





- Review of Systems


Able to Perform ROS?: Yes





<Gi Tyson - Last Filed: 12/04/17 20:53>





<Eri Zaman - Last Filed: 12/06/17 02:03>





- Review of Systems


Comments:: 





12/04/17 20:50








CONSTITUTIONAL: 


Absent: fever, chills, diaphoresis, generalized weakness, malaise, loss of 

appetite


HEENT: 


Absent: rhinorrhea, nasal congestion, throat pain, throat swelling, difficulty 

swallowing, mouth swelling, ear pain, eye pain, visual Changes


CARDIOVASCULAR: +  chest pain


Absent:, syncope, palpitations, irregular heart rate, lightheadedness, 

peripheral edema


RESPIRATORY:  +cough, shortness of breath


Absent: dyspnea with exertion, orthopnea, wheezing, stridor, hemoptysis


GASTROINTESTINAL:


Absent: abdominal pain, abdominal distension, nausea, vomiting, diarrhea, 

constipation, melena, hematochezia


GENITOURINARY: 


Absent: dysuria, frequency, urgency, hesitancy, hematuria, flank pain, genital 

pain


MUSCULOSKELETAL: 


Absent: myalgia, arthralgia, joint swelling


SKIN: 


Absent: rash, itching, pallor


HEMATOLOGIC/IMMUNOLOGIC: 


Absent: easy bleeding, easy bruising, lymphadenopathy, frequent infections


ENDOCRINE:


Absent: unexplained weight gain, unexplained weight loss, heat intolerance, 

cold intolerance


NEUROLOGIC: 


Absent: headache, focal weakness or paresthesias, dizziness, unsteady gait, 

seizure, mental status changes, bladder or bowel incontinence


PSYCHIATRIC: 


Absent: anxiety, depression, suicidal or homicidal ideation, hallucinations.








 (Gi Tyson)





*Physical Exam





<Gi Tyson - Last Filed: 12/04/17 20:53>





<Eri Zaman - Last Filed: 12/06/17 02:03>





- Vital Signs


 Last Vital Signs











Temp Pulse Resp BP Pulse Ox


 


 97.5 F L  90   18   133/69   95 


 


 12/05/17 22:00  12/05/17 22:00  12/05/17 22:00  12/05/17 22:00  12/05/17 21:00














- Physical Exam


Comments: 





12/04/17 20:51








GENERAL:


Well developed, well nourished. Awake and alert. No acute distress.


HEENT:


Normocephalic, atraumatic. PERRLA, EOMI. No conjunctival pallor. Sclera are non-

icteric. Moist mucous membranes. Oropharynx is clear.


NECK: Supple. Full ROM. No JVD. Carotid pulses 2+ and symmetric, without 

bruits. No thyromegaly. No lymphadenopathy.


CARDIOVASCULAR:


Regular rate and rhythm. No murmurs, rubs, or gallops. Distal pulses are 2+ and 

symmetric. 


PULMONARY: +Fine crackle to the L base. Decreased breath sounds. No evidence of 

respiratory distress. No wheezing, rales. 


ABDOMINAL:


Soft. Non-tender. Non-distended. No rebound or guarding. No organomegaly. 

Normoactive bowel sounds. 


MUSCULOSKELETAL 


Normal range of motion at all joints. No bony deformities or tenderness. No CVA 

tenderness.


EXTREMITIES: 


No cyanosis. No clubbing. No edema. No calf tenderness.


SKIN: Warm and dry. Normal capillary refill. No rashes. No jaundice. 


NEUROLOGICAL: Alert, awake, appropriate. Cranial nerves 2-12 intact. No 

deficits to light touch and temperature in face, upper extremities and lower 

extremities. No motor deficits in the in face, upper extremities and lower 

extremities. Normoreflexic in the upper and lower extremities. Normal speech. 

Toes are down-going bilaterally. Gait is normal without ataxia.


PSYCHIATRIC: 


Cooperative. Good eye contact. Appropriate mood and affect. (Gi Tyson)





ED Treatment Course





- LABORATORY


CBC & Chemistry Diagram: 


 12/04/17 16:11





 12/04/17 16:11





<Gi Tyson - Last Filed: 12/04/17 20:53>





- LABORATORY


CBC & Chemistry Diagram: 


 12/05/17 05:22





 12/05/17 05:22





<Eri Zaman - Last Filed: 12/06/17 02:03>





- ADDITIONAL ORDERS


Additional order review: 


 











  12/04/17





  16:11


 


RBC  4.62


 


MCV  95.0


 


MCHC  34.0


 


RDW  14.0


 


MPV  7.6


 


Neutrophils %  68.8  D


 


Lymphocytes %  17.6  D


 


Monocytes %  9.3


 


Eosinophils %  3.7


 


Basophils %  0.6














- RADIOLOGY


Radiology Studies Ordered: 














 Category Date Time Status


 


 CHEST X-RAY PORTABLE* [RAD] Stat Radiology  12/04/17 18:47 Completed














- Medications


Given in the ED: 


ED Medications














Discontinued Medications














Generic Name Dose Route Start Last Admin





  Trade Name Freq  PRN Reason Stop Dose Admin


 


Albuterol/Ipratropium  1 amp  12/04/17 20:45  12/04/17 20:45





  Duoneb -  NEB  12/04/17 20:46  1 amp





  ONCE STA   Administration


 


Aspirin  81 mg  12/04/17 20:48  12/04/17 20:45





  Asa -  PO  12/04/17 20:49  81 mg





  ONCE ONE   Administration


 


Hydromorphone HCl  1 mg  12/04/17 22:00  12/04/17 22:30





  Dilaudid Injection -  IVPUSH  12/04/17 22:01  1 mg





  ONCE ONE   Administration


 


Potassium Phosphate 16 mm/  250 mls @ 62.5 mls/hr  12/05/17 10:00  12/05/17 11:

00





  Sodium Chloride  IVPB  12/05/17 13:59  Not Given





  ONCE ONE   


 


Potassium Phosphate 16 mm/  255.3333 mls @ 62.5 mls/hr  12/05/17 10:41  12/05/ 17 12:13





  Sodium Chloride  IVPB  12/05/17 14:05  62.5 mls/hr





  ONCE ONE   Administration


 


Prednisone  60 mg  12/04/17 20:45  12/04/17 20:55





  Deltasone -  PO  12/04/17 20:46  60 mg





  ONCE ONE   Administration














Medical Decision Making





<Gi Tyson - Last Filed: 12/04/17 20:53>





<Eri Zaman - Last Filed: 12/06/17 02:03>





- Medical Decision Making





12/04/17 21:14


77-year-old man with a history of coronary artery disease, COPD, who is home to 

dependent, presents with dyspnea and chest pain.  EKG does not show any signs 

of ischemia.


cxr + infi;ltrate


 pt has copd exacerbation and pneumonia and received resp tx,steroids,

antibiotics and was admitted


12/06/17 02:02


 (Eri Zaman)





*DC/Admit/Observation/Transfer





<Gi Tyson - Last Filed: 12/04/17 20:53>





- Discharge Dispostion


Admit: Yes





<Eri Zaman - Last Filed: 12/06/17 02:03>


Diagnosis at time of Disposition: 


Dyspnea


Qualifiers:


 Dyspnea type: other forms of dyspnea Qualified Code(s): R06.09 - Other forms 

of dyspnea





Chest pain


Qualifiers:


 Chest pain type: unspecified Qualified Code(s): R07.9 - Chest pain, unspecified








- Attestations


Scribe Attestion: 





12/04/17 20:51





Documentation prepared by Gi Tyson, acting as medical scribe for Eri Zaman MD


 (Gi Tyson)

## 2017-12-05 LAB
ANION GAP SERPL CALC-SCNC: 7 MMOL/L (ref 8–16)
BASOPHILS # BLD: 0.2 % (ref 0–2)
CALCIUM SERPL-MCNC: 8.4 MG/DL (ref 8.5–10.1)
CK SERPL-CCNC: 41 IU/L (ref 39–308)
CK SERPL-CCNC: 43 IU/L (ref 39–308)
CO2 SERPL-SCNC: 26 MMOL/L (ref 21–32)
CREAT SERPL-MCNC: 1.2 MG/DL (ref 0.7–1.3)
DEPRECATED RDW RBC AUTO: 13.9 % (ref 11.9–15.9)
EOSINOPHIL # BLD: 0.1 % (ref 0–4.5)
GLUCOSE SERPL-MCNC: 189 MG/DL (ref 74–106)
MAGNESIUM SERPL-MCNC: 2.5 MG/DL (ref 1.8–2.4)
MCH RBC QN AUTO: 31.5 PG (ref 25.7–33.7)
MCHC RBC AUTO-ENTMCNC: 33.2 G/DL (ref 32–35.9)
MCV RBC: 94.8 FL (ref 80–96)
NEUTROPHILS # BLD: 84.7 % (ref 42.8–82.8)
PHOSPHATE SERPL-MCNC: 1.5 MG/DL (ref 2.5–4.9)
PLATELET # BLD AUTO: 145 K/MM3 (ref 134–434)
PMV BLD: 8.1 FL (ref 7.5–11.1)
TROPONIN I SERPL-MCNC: < 0.02 NG/ML (ref 0–0.05)
TROPONIN I SERPL-MCNC: < 0.02 NG/ML (ref 0–0.05)
WBC # BLD AUTO: 5.6 K/MM3 (ref 4–10)

## 2017-12-05 RX ADMIN — PANTOPRAZOLE SODIUM SCH MG: 40 TABLET, DELAYED RELEASE ORAL at 09:38

## 2017-12-05 RX ADMIN — APIXABAN SCH MG: 5 TABLET, FILM COATED ORAL at 22:20

## 2017-12-05 RX ADMIN — MORPHINE SULFATE SCH MG: 30 TABLET, EXTENDED RELEASE ORAL at 21:36

## 2017-12-05 RX ADMIN — RANOLAZINE SCH MG: 500 TABLET, FILM COATED, EXTENDED RELEASE ORAL at 21:36

## 2017-12-05 RX ADMIN — IPRATROPIUM BROMIDE AND ALBUTEROL SULFATE SCH AMP: .5; 3 SOLUTION RESPIRATORY (INHALATION) at 23:13

## 2017-12-05 RX ADMIN — Medication SCH MG: at 10:59

## 2017-12-05 RX ADMIN — MORPHINE SULFATE SCH MG: 30 TABLET, EXTENDED RELEASE ORAL at 09:39

## 2017-12-05 RX ADMIN — TAMSULOSIN HYDROCHLORIDE SCH MG: 0.4 CAPSULE ORAL at 08:38

## 2017-12-05 RX ADMIN — IPRATROPIUM BROMIDE AND ALBUTEROL SULFATE SCH AMP: .5; 3 SOLUTION RESPIRATORY (INHALATION) at 06:15

## 2017-12-05 RX ADMIN — RANOLAZINE SCH MG: 500 TABLET, FILM COATED, EXTENDED RELEASE ORAL at 09:38

## 2017-12-05 RX ADMIN — IPRATROPIUM BROMIDE AND ALBUTEROL SULFATE SCH: .5; 3 SOLUTION RESPIRATORY (INHALATION) at 17:20

## 2017-12-05 RX ADMIN — IPRATROPIUM BROMIDE AND ALBUTEROL SULFATE SCH AMP: .5; 3 SOLUTION RESPIRATORY (INHALATION) at 11:10

## 2017-12-05 RX ADMIN — APIXABAN SCH MG: 5 TABLET, FILM COATED ORAL at 00:00

## 2017-12-05 RX ADMIN — APIXABAN SCH MG: 5 TABLET, FILM COATED ORAL at 09:38

## 2017-12-05 RX ADMIN — PREDNISONE SCH MG: 20 TABLET ORAL at 14:31

## 2017-12-05 RX ADMIN — IPRATROPIUM BROMIDE AND ALBUTEROL SULFATE SCH AMP: .5; 3 SOLUTION RESPIRATORY (INHALATION) at 00:00

## 2017-12-05 NOTE — CON.CARD
Cardiology Consult (text)





- Consultation


Consultation Note: 





cc:  cp/sob





hpi:  78 yo m hx of CAD with multiple stents (last cath was 1/2012 at [facility]: dontae 

to om1, residual dz: 40%pLCX, 50%pLAD, 60%mLAD, 80%D1, 40%pRCA), hld, gerd, 

chronic atypical cp, copd, cva ('93, '04/residual left sided weakness), Factor 

5 leiden/PE/DVT S/P IVC Filter(2003) on xarelto, HTN, BPH, Prostate Ca, 

hypothyroid, Chronic Pain Syndrome, Anxiety, OA here with cp  





Pt has chronic atypical cp, sometimes  related to gerd.   Current pain is 

reproducible to palpation and worse with coughing.  





Also with worsened sob in setting of URi sx's.  + cough and congestion.  No 

fevers. 





+ chronic pain diffuse throughout body.  + neck pain





 


No f/c/s, n/v/d, weight change, h/a, rashes. 


No palps, dizzy, loc, pnd, orthopnea, le edema





Sees dr luke for cardio.





pmh: per hpi


psh:  ivc filter


social: no tob


fam:  parents with thoracic aortic aneurysms


ros: per hpi; no fever/chills, n/d,  rash, HA, vision changes, wt loss


meds:








Ambulatory Orders





Hydromorphone HCl [Dilaudid] 8 mg PO TID PRN 08/26/16 


Linaclotide [Linzess] 290 mcg PO DAILY 08/26/16 


Thyroid [Ranier Thyroid] 60 mg PO DAILY 08/26/16 


Zolpidem Tartrate [Ambien] 10 mg PO HS PRN 08/26/16 


Apixaban [Eliquis] 5 mg PO BID 04/13/17 


Omeprazole 40 mg PO DAILY 04/13/17 


Tamsulosin HCl 0.4 mg PO DAILY 04/13/17 


Ranolazine [Ranexa] 500 mg PO BID 04/14/17 


Morphine *Sr* [Ms Contin -] 30 mg PO Q12H 12/04/17 





 Current Medications





Albuterol/Ipratropium (Duoneb -)  1 amp NEB QIDR [facility]


   Last Admin: 12/05/17 06:15 Dose:  1 amp


Apixaban (Eliquis -)  5 mg PO BID [facility]


   Last Admin: 12/05/17 09:38 Dose:  5 mg


Furosemide (Lasix Injection -)  40 mg IVPUSH NOW ONE


   Stop: 12/05/17 22:16


Hydromorphone HCl (Dilaudid -)  8 mg PO Q8H PRN


   PRN Reason: PAIN


   Last Admin: 12/05/17 09:45 Dose:  8 mg


Potassium Phosphate 16 mm/ (Sodium Chloride)  255.3333 mls @ 62.5 mls/hr IVPB 

ONCE ONE


   Stop: 12/05/17 14:05


   Last Admin: 12/05/17 12:13 Dose:  62.5 mls/hr


Morphine Sulfate (Ms Contin -)  30 mg PO BID [facility]


   Last Admin: 12/05/17 09:39 Dose:  30 mg


Non-Formulary Medication (Linaclotide [Linzess])  290 mcg PO DAILY [facility]


Pantoprazole Sodium (Protonix -)  40 mg PO DAILY [facility]


   Last Admin: 12/05/17 09:38 Dose:  40 mg


Ranolazine (Ranexa -)  500 mg PO BID [facility]


   Last Admin: 12/05/17 09:38 Dose:  500 mg


Tamsulosin HCl (Flomax -)  0.4 mg PO DAILY@0830 [facility]


   Last Admin: 12/05/17 08:38 Dose:  0.4 mg


Thyroid (Ranier Thyroid -)  60 mg PO DAILY [facility]


   Last Admin: 12/05/17 10:59 Dose:  60 mg














pe:


 


 


 Vital Signs - 24 hr











  12/04/17 12/04/17 12/04/17





  16:29 17:42 17:55


 


Temperature 97.4 F L  


 


Pulse Rate 90  


 


Pulse Rate [  78 75





Apical]   


 


Respiratory 22 18 17





Rate   


 


Blood Pressure 140/72  


 


Blood Pressure  119/74 118/72





[Left Arm]   


 


O2 Sat by Pulse 96 96 100





Oximetry (%)   














  12/05/17 12/05/17 12/05/17





  00:36 01:29 05:00


 


Temperature 98.7 F 98.1 F 98.0 F


 


Pulse Rate  72 94 H


 


Pulse Rate [ 72  





Apical]   


 


Respiratory 18 18 18





Rate   


 


Blood Pressure  144/74 122/68


 


Blood Pressure 120/82  





[Left Arm]   


 


O2 Sat by Pulse 98 97 





Oximetry (%)   














  12/05/17





  09:00


 


Temperature 98.0 F


 


Pulse Rate 90


 


Pulse Rate [ 





Apical] 


 


Respiratory 18





Rate 


 


Blood Pressure 128/69


 


Blood Pressure 





[Left Arm] 


 


O2 Sat by Pulse 100





Oximetry (%) 








 Intake & Output











 12/03/17 12/04/17 12/05/17 12/06/17





 07:59 07:59 07:59 07:59


 


Weight   224 lb 3.997 oz 224 lb 3.997 oz














nad, no jvd


rrr s1s2 no mrg


diffuse rales.  trace wheezes. 


aaox3


no le e/c/c


abd nt nd pos bs


pos dp pt, no carotid bruits


no jaundice diaphoresis








 


 


 CBC, BMP





 12/05/17 05:22 





 12/05/17 05:22 





 Laboratory Tests











  11/11/16 12/04/17 12/05/17





  16:48 16:11 00:30


 


Magnesium   


 


B-Natriuretic Peptide  654.58 H  


 


Troponin I   < 0.02  < 0.02














  12/05/17 12/05/17





  00:30 05:22


 


Magnesium   2.5 H


 


B-Natriuretic Peptide  249.89 


 


Troponin I   < 0.02














ecg: sr, anterior twi- similar to priors allowing for differences in lead 

placement. 


tele:   SR, sinus tach.  Run of SVT.  8b-nsvt/vt





cxr:   bilateral increased markings similar to priors





stress MPI 2015: NO ekg changes. Mild CP after injection. EF 58%. Nl perf


dobuta stress echo 11/2014:  75% mphr, no ischemic changes, nl lvef


mibi 10/2013:  no ischemia, nl lvef





echo 5/2016: suboptimal views.  EF 50-55%. Impaired relaxation. Nl RV (

suboptimal views). Mild ao dilation 3.8 cm with laminar atherosclerotic plaque. 





  


a/p:  78 yo m hx of CAD with multiple stents (last cath was 1/2012 at [facility]: dontae 

to om1, residual dz: 40%pLCX, 50%pLAD, 60%mLAD, 80%D1, 40%pRCA), hld, gerd, 

chronic atypical cp, copd, cva ('93, '04/residual left sided weakness), Factor 

5 leiden/PE/DVT S/P IVC Filter(2003) on xarelto, HTN, BPH, Prostate Ca, 

hypothyroid, Chronic Pain Syndrome, Anxiety, OA here with cp   





cp/sob:


- SOB in setting of URI sx's.  bnp lower than priors.  Would not recommend 

diuresis.  


-pt has hx of atypical cp but also hx of cad and pci, so symptoms are sometimes 

unclear.  Here however, appears to be msk related 2/2 cough.  


-currently no signs of acs with trop neg and no ischemic ecg changes.    


 - Low suspicion for PE given AC and IVC filter.   pulm following.  con't 

eliquis





cad, pci:


-no signs of acs


-nl lvef


-cont ranexa, ac.    





hld:


-? statin





htn:


- bp controlled off anti-hypertensives.  





Hypercoaguability


- s/p IVC filter. Continue eliquis

## 2017-12-05 NOTE — CONSULT
Consultation: 


REQUESTING PROVIDER:





CONSULT REQUEST: We have been asked to medically evaluate this patient for SOB, 

possible COPD exacerbation.





HISTORY OF PRESENT ILLNESS:





77M w/ hx of COPD on home O2, PNA, CAD s/p stents, CVA w/ residual left-sided 

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 

BPH, and prostate cancer presenting with productive cough and SOB for past 2 

weeks. Per pt, he was in his USOH until 2 weeks ago when he developed a 

productive cough of thick sputum (pt has difficulty identifying its color) 

accompanied by increasing CHRISTENSEN, PND, orthopnea, LE edema, and chest pain upon 

coughing. He went to [facility] for 7 days, does not know what they 

diagnosed him with, and he states that he did not improve there. Over the next 

7 days at home, his symptoms continued prompting him to come here. He endorses 

preceding sinus headache and rhinorrhea, and he denies recent travel, sick 

contacts, fevers, chills, palpitations, abdominal pain, diarrhea, and dysuria. 

He endorses chronic nausea which he attributes to his GERD, chronic constipation

, and LE pain. Pt denies hx of tobacco use, but is a retired  with 

significant smoke exposure. 





REVIEW OF SYSTEMS:


CONSTITUTIONAL: 


Absent:  fever, chills, generalized weakness, malaise, loss of appetite, weight 

change


present: diaphoresis


HEENT: 


Absent:  throat pain, throat swelling, difficulty swallowing, mouth swelling, 

ear pain, eye pain, visual changes


present: rhinorrhea, nasal congestion


CARDIOVASCULAR: 


Absent: chest pain, syncope, palpitations, irregular heart rate, lightheadedness

, peripheral edema


RESPIRATORY: 


Absent: wheezing, stridor, hemoptysis


present: cough, SOB, CHRISTENSEN, orthopnea, 


GASTROINTESTINAL:


Absent: abdominal pain, abdominal distension, vomiting, diarrhea, melena, 

hematochezia


present: nausea, constipation


GENITOURINARY: 


Absent: dysuria, frequency, urgency, hesitancy, hematuria, flank pain, genital 

pain


MUSCULOSKELETAL: 


Absent: joint swelling, back pain, neck pain


present: leg pain


SKIN: 


Absent: rash, itching, pallor


HEMATOLOGIC/IMMUNOLOGIC: 


Absent: lymphadenopathy, frequent infections


ENDOCRINE:


Absent: unexplained weight gain, unexplained weight loss, heat intolerance, 

cold intolerance


NEUROLOGIC: 


Absent: headache, focal weakness or paresthesias, dizziness, unsteady gait, 

seizure, mental status changes, bladder or bowel incontinence


PSYCHIATRIC: 


Absent: anxiety, depression, suicidal or homicidal ideation, hallucinations.





PHYSICAL EXAMINATION





 Vital Signs - 24 hr











  12/04/17 12/04/17 12/04/17





  16:29 17:42 17:55


 


Temperature 97.4 F L  


 


Pulse Rate 90  


 


Pulse Rate [  78 75





Apical]   


 


Respiratory 22 18 17





Rate   


 


Blood Pressure 140/72  


 


Blood Pressure  119/74 118/72





[Left Arm]   


 


O2 Sat by Pulse 96 96 100





Oximetry (%)   














  12/05/17 12/05/17 12/05/17





  00:36 01:29 05:00


 


Temperature 98.7 F 98.1 F 98.0 F


 


Pulse Rate  72 94 H


 


Pulse Rate [ 72  





Apical]   


 


Respiratory 18 18 18





Rate   


 


Blood Pressure  144/74 122/68


 


Blood Pressure 120/82  





[Left Arm]   


 


O2 Sat by Pulse 98 97 





Oximetry (%)   














  12/05/17 12/05/17





  09:00 10:20


 


Temperature 98.0 F 


 


Pulse Rate 90 92 H


 


Pulse Rate [  





Apical]  


 


Respiratory 18 





Rate  


 


Blood Pressure 128/69 


 


Blood Pressure  





[Left Arm]  


 


O2 Sat by Pulse 100 94 L





Oximetry (%)  














GENERAL: elderly pleasant male, awake, alert, and fully oriented, in no acute 

distress.


HEENT: moist mucous membranes, no pharyngeal erythema, left-sided facial droop


NECK: Normal range of motion, supple without lymphadenopathy, JVD, or masses.


LUNGS: bibasilar rales


HEART: Regular rate and rhythm, normal S1 and S2 without murmur, rub or gallop.


ABDOMEN: Soft, nontender, not distended, normoactive bowel sounds, no guarding, 

no rebound, no masses.  No hepatomegaly or splenomegaly. 


MUSCULOSKELETAL: 1+ b/l LE edema, tender to palpation of LLE


NEUROLOGICAL:  AAOx3


PSYCHIATRIC: Cooperative. Good eye contact. Appropriate mood and affect.














 Laboratory Results - last 24 hr











  12/04/17 12/04/17 12/04/17





  16:11 16:11 16:11


 


WBC  6.4  


 


RBC  4.62  


 


Hgb  14.9  


 


Hct  43.9  


 


MCV  95.0  


 


MCH  32.3  


 


MCHC  34.0  


 


RDW  14.0  


 


Plt Count  165  


 


MPV  7.6  


 


Neutrophils %  68.8  D  


 


Lymphocytes %  17.6  D  


 


Monocytes %  9.3  


 


Eosinophils %  3.7  


 


Basophils %  0.6  


 


PT with INR   15.60 H 


 


INR   1.38 H 


 


Sodium    139


 


Potassium    4.7


 


Chloride    100


 


Carbon Dioxide    31


 


Anion Gap    8


 


BUN    17


 


Creatinine    1.2


 


Creat Clearance w eGFR    58.71


 


Random Glucose    97


 


Calcium    9.2


 


Phosphorus   


 


Magnesium   


 


Total Bilirubin    0.5  D


 


AST    13 L


 


ALT    25


 


Alkaline Phosphatase    69


 


Creatine Kinase    49


 


Troponin I    < 0.02


 


B-Natriuretic Peptide   


 


Total Protein    6.7


 


Albumin    3.6


 


Blood Type   


 


Antibody Screen   














  12/04/17 12/05/17 12/05/17





  16:11 00:30 00:30


 


WBC   


 


RBC   


 


Hgb   


 


Hct   


 


MCV   


 


MCH   


 


MCHC   


 


RDW   


 


Plt Count   


 


MPV   


 


Neutrophils %   


 


Lymphocytes %   


 


Monocytes %   


 


Eosinophils %   


 


Basophils %   


 


PT with INR   


 


INR   


 


Sodium   


 


Potassium   


 


Chloride   


 


Carbon Dioxide   


 


Anion Gap   


 


BUN   


 


Creatinine   


 


Creat Clearance w eGFR   


 


Random Glucose   


 


Calcium   


 


Phosphorus   


 


Magnesium   


 


Total Bilirubin   


 


AST   


 


ALT   


 


Alkaline Phosphatase   


 


Creatine Kinase   41 


 


Troponin I   < 0.02 


 


B-Natriuretic Peptide    249.89


 


Total Protein   


 


Albumin   


 


Blood Type  B POSITIVE  


 


Antibody Screen  Negative  














  12/05/17 12/05/17





  05:22 05:22


 


WBC   5.6


 


RBC   4.54


 


Hgb   14.3


 


Hct   43.0


 


MCV   94.8


 


MCH   31.5


 


MCHC   33.2


 


RDW   13.9


 


Plt Count   145


 


MPV   8.1


 


Neutrophils %   84.7 H D


 


Lymphocytes %   13.3  D


 


Monocytes %   1.7 L D


 


Eosinophils %   0.1  D


 


Basophils %   0.2


 


PT with INR  


 


INR  


 


Sodium  137 


 


Potassium  4.4 


 


Chloride  104 


 


Carbon Dioxide  26 


 


Anion Gap  7 L 


 


BUN  16 


 


Creatinine  1.2 


 


Creat Clearance w eGFR  


 


Random Glucose  189 H D 


 


Calcium  8.4 L 


 


Phosphorus  1.5 L D 


 


Magnesium  2.5 H 


 


Total Bilirubin  


 


AST  


 


ALT  


 


Alkaline Phosphatase  


 


Creatine Kinase  43 


 


Troponin I  < 0.02 


 


B-Natriuretic Peptide  


 


Total Protein  


 


Albumin  


 


Blood Type  


 


Antibody Screen  








Active Medications











Generic Name Dose Route Start Last Admin





  Trade Name Freq  PRN Reason Stop Dose Admin


 


Albuterol/Ipratropium  1 amp  12/04/17 22:15  12/05/17 11:10





  Duoneb -  NEB   1 amp





  QIDR MARCELO   Administration


 


Apixaban  5 mg  12/04/17 22:30  12/05/17 09:38





  Eliquis -  PO   5 mg





  BID MARCELO   Administration


 


Furosemide  40 mg  12/05/17 22:15  





  Lasix Injection -  IVPUSH  12/05/17 22:16  





  NOW ONE   


 


Hydromorphone HCl  8 mg  12/04/17 22:16  12/05/17 09:45





  Dilaudid -  PO   8 mg





  Q8H PRN   Administration





  PAIN   


 


Potassium Phosphate 16 mm/  255.3333 mls @ 62.5 mls/hr  12/05/17 10:41  12/05/ 17 12:13





  Sodium Chloride  IVPB  12/05/17 14:05  62.5 mls/hr





  ONCE ONE   Administration


 


Morphine Sulfate  30 mg  12/05/17 10:00  12/05/17 09:39





  Ms Contin -  PO   30 mg





  BID MARCELO   Administration


 


Non-Formulary Medication  290 mcg  12/05/17 10:00  





  Linaclotide [Linzess]  PO   





  DAILY MARCELO   


 


Pantoprazole Sodium  40 mg  12/05/17 10:00  12/05/17 09:38





  Protonix -  PO   40 mg





  DAILY MARCELO   Administration


 


Ranolazine  500 mg  12/05/17 10:00  12/05/17 09:38





  Ranexa -  PO   500 mg





  BID MARCELO   Administration


 


Tamsulosin HCl  0.4 mg  12/05/17 08:30  12/05/17 08:38





  Flomax -  PO   0.4 mg





  DAILY@0830 MARCELO   Administration


 


Thyroid  60 mg  12/05/17 10:00  12/05/17 10:59





  [place] Thyroid -  PO   60 mg





  DAILY MARCELO   Administration








CXR: chronic b/l increased lung markings and mild bibasal atelectatic changes





ASSESSMENT/PLAN:


77M w/ hx of COPD on home O2, PNA, CAD s/p stents, CVA w/ residual left-sided 

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 

BPH, and prostate cancer presenting with acute productive cough and SOB.





#COPD exacerbation


-lasix


-duonebs


-prednisone 40mg daily


-outpt PFTs


-incentive spirometry





Rest of care per medical team





Plan discussed with attending, Dr. Azevedo.





Dispo: We will continue to follow the patient. Thank you for this consultative 

opportunity.





-Haresh Richardson MD PGY1


Pulmonology Team











Visit type





- Emergency Visit


Emergency Visit: Yes


ED Registration Date: 12/04/17


Care time: The patient presented to the Emergency Department on the above date 

and was hospitalized for further evaluation of their emergent condition.





- New Patient


This patient is new to me today: Yes


Date on this admission: 12/05/17





- Critical Care


Critical Care patient: No

## 2017-12-05 NOTE — PN
Teaching Attending Note


Name of Resident: Haresh Richardson





ATTENDING PHYSICIAN STATEMENT





I saw and evaluated the patient.


I reviewed the resident's note and discussed the case with the resident.


I agree with the resident's findings and plan as documented.








SUBJECTIVE:


77 M, O2 dependent COPD, PNA, CAD s/p stents, CVA w/ residual left-sided 

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 

and prostate cancer. 





Admitted via the ER due to productive cough and SOB for past 2 weeks.  Patient 

was admitted to [facility] for 7 days with similar symptoms. 





He has been home for about 1 week with continued symptoms.  Unclear if the 

symptoms have been progressing. 





No hemoptysis or night sweats. 





No travel history or specific sick contacts. 





Patient denies significant previous tobacco use, but is a retired  

with significant smoke exposure. 





PHYSICAL EXAMINATION





 Vital Signs - 24 hr











  12/04/17 12/04/17 12/04/17





  16:29 17:42 17:55


 


Temperature 97.4 F L  


 


Pulse Rate 90  


 


Pulse Rate [  78 75





Apical]   


 


Respiratory 22 18 17





Rate   


 


Blood Pressure 140/72  


 


Blood Pressure  119/74 118/72





[Left Arm]   


 


O2 Sat by Pulse 96 96 100





Oximetry (%)   














  12/05/17 12/05/17 12/05/17





  00:36 01:29 05:00


 


Temperature 98.7 F 98.1 F 98.0 F


 


Pulse Rate  72 94 H


 


Pulse Rate [ 72  





Apical]   


 


Respiratory 18 18 18





Rate   


 


Blood Pressure  144/74 122/68


 


Blood Pressure 120/82  





[Left Arm]   


 


O2 Sat by Pulse 98 97 





Oximetry (%)   














  12/05/17 12/05/17





  09:00 10:20


 


Temperature 98.0 F 


 


Pulse Rate 90 92 H


 


Pulse Rate [  





Apical]  


 


Respiratory 18 





Rate  


 


Blood Pressure 128/69 


 


Blood Pressure  





[Left Arm]  


 


O2 Sat by Pulse 100 94 L





Oximetry (%)  














GENERAL: awake, alert, and fully oriented, in no acute distress.


HEENT: moist mucous membranes, no pharyngeal erythema, left-sided facial droop


NECK: Normal range of motion, supple without lymphadenopathy, JVD, or masses.


LUNGS: bibasilar rales, no active wheezing 


HEART: Regular rate and rhythm, normal S1 and S2 without murmur, rub or gallop.


ABDOMEN: Soft, nontender, not distended, normoactive bowel sounds, no guarding, 

no rebound, no masses.  No hepatomegaly or splenomegaly. 


MUSCULOSKELETAL: 1+ b/l LE edema, tender to palpation of LLE


NEUROLOGICAL:  AAOx3


PSYCHIATRIC: Cooperative. Good eye contact. Appropriate mood and affect.














 Laboratory Results - last 24 hr











  12/04/17 12/04/17 12/04/17





  16:11 16:11 16:11


 


WBC  6.4  


 


RBC  4.62  


 


Hgb  14.9  


 


Hct  43.9  


 


MCV  95.0  


 


MCH  32.3  


 


MCHC  34.0  


 


RDW  14.0  


 


Plt Count  165  


 


MPV  7.6  


 


Neutrophils %  68.8  D  


 


Lymphocytes %  17.6  D  


 


Monocytes %  9.3  


 


Eosinophils %  3.7  


 


Basophils %  0.6  


 


PT with INR   15.60 H 


 


INR   1.38 H 


 


Sodium    139


 


Potassium    4.7


 


Chloride    100


 


Carbon Dioxide    31


 


Anion Gap    8


 


BUN    17


 


Creatinine    1.2


 


Creat Clearance w eGFR    58.71


 


Random Glucose    97


 


Calcium    9.2


 


Phosphorus   


 


Magnesium   


 


Total Bilirubin    0.5  D


 


AST    13 L


 


ALT    25


 


Alkaline Phosphatase    69


 


Creatine Kinase    49


 


Troponin I    < 0.02


 


B-Natriuretic Peptide   


 


Total Protein    6.7


 


Albumin    3.6


 


Blood Type   


 


Antibody Screen   














  12/04/17 12/05/17 12/05/17





  16:11 00:30 00:30


 


WBC   


 


RBC   


 


Hgb   


 


Hct   


 


MCV   


 


MCH   


 


MCHC   


 


RDW   


 


Plt Count   


 


MPV   


 


Neutrophils %   


 


Lymphocytes %   


 


Monocytes %   


 


Eosinophils %   


 


Basophils %   


 


PT with INR   


 


INR   


 


Sodium   


 


Potassium   


 


Chloride   


 


Carbon Dioxide   


 


Anion Gap   


 


BUN   


 


Creatinine   


 


Creat Clearance w eGFR   


 


Random Glucose   


 


Calcium   


 


Phosphorus   


 


Magnesium   


 


Total Bilirubin   


 


AST   


 


ALT   


 


Alkaline Phosphatase   


 


Creatine Kinase   41 


 


Troponin I   < 0.02 


 


B-Natriuretic Peptide    249.89


 


Total Protein   


 


Albumin   


 


Blood Type  B POSITIVE  


 


Antibody Screen  Negative  














  12/05/17 12/05/17





  05:22 05:22


 


WBC   5.6


 


RBC   4.54


 


Hgb   14.3


 


Hct   43.0


 


MCV   94.8


 


MCH   31.5


 


MCHC   33.2


 


RDW   13.9


 


Plt Count   145


 


MPV   8.1


 


Neutrophils %   84.7 H D


 


Lymphocytes %   13.3  D


 


Monocytes %   1.7 L D


 


Eosinophils %   0.1  D


 


Basophils %   0.2


 


PT with INR  


 


INR  


 


Sodium  137 


 


Potassium  4.4 


 


Chloride  104 


 


Carbon Dioxide  26 


 


Anion Gap  7 L 


 


BUN  16 


 


Creatinine  1.2 


 


Creat Clearance w eGFR  


 


Random Glucose  189 H D 


 


Calcium  8.4 L 


 


Phosphorus  1.5 L D 


 


Magnesium  2.5 H 


 


Total Bilirubin  


 


AST  


 


ALT  


 


Alkaline Phosphatase  


 


Creatine Kinase  43 


 


Troponin I  < 0.02 


 


B-Natriuretic Peptide  


 


Total Protein  


 


Albumin  


 


Blood Type  


 


Antibody Screen  








Active Medications











Generic Name Dose Route Start Last Admin





  Trade Name Freq  PRN Reason Stop Dose Admin


 


Albuterol/Ipratropium  1 amp  12/04/17 22:15  12/05/17 11:10





  Duoneb -  NEB   1 amp





  QIDR MARCELO   Administration


 


Apixaban  5 mg  12/04/17 22:30  12/05/17 09:38





  Eliquis -  PO   5 mg





  BID MARCELO   Administration


 


Furosemide  40 mg  12/05/17 22:15  





  Lasix Injection -  IVPUSH  12/05/17 22:16  





  NOW ONE   


 


Hydromorphone HCl  8 mg  12/04/17 22:16  12/05/17 09:45





  Dilaudid -  PO   8 mg





  Q8H PRN   Administration





  PAIN   


 


Potassium Phosphate 16 mm/  255.3333 mls @ 62.5 mls/hr  12/05/17 10:41  12/05/ 17 12:13





  Sodium Chloride  IVPB  12/05/17 14:05  62.5 mls/hr





  ONCE ONE   Administration


 


Morphine Sulfate  30 mg  12/05/17 10:00  12/05/17 09:39





  Ms Contin -  PO   30 mg





  BID MARCELO   Administration


 


Non-Formulary Medication  290 mcg  12/05/17 10:00  





  Linaclotide [Linzess]  PO   





  DAILY MARCELO   


 


Pantoprazole Sodium  40 mg  12/05/17 10:00  12/05/17 09:38





  Protonix -  PO   40 mg





  DAILY MARCELO   Administration


 


Ranolazine  500 mg  12/05/17 10:00  12/05/17 09:38





  Ranexa -  PO   500 mg





  BID MARCELO   Administration


 


Tamsulosin HCl  0.4 mg  12/05/17 08:30  12/05/17 08:38





  Flomax -  PO   0.4 mg





  DAILY@0830 MARCELO   Administration


 


Thyroid  60 mg  12/05/17 10:00  12/05/17 10:59





  [place] Thyroid -  PO   60 mg





  DAILY MARCELO   Administration








CXR: chronic b/l increased lung markings and mild bibasal atelectatic changes





ASSESSMENT/PLAN:


Acute Exacerbation of COPD


Acute on Chronic Bronchitis 


Possible underlying ILD (chronic changes and some possible honeycombing on CT 

imaging) 


O2 dependent COPD


PNA


CAD s/p stents


CVA w/ residual left-sided weakness


Factor 5 leiden and DVT/PE s/p IVC filter on AC


HTN


HLD


GERD


BPH


Prostate cancer





Prednisone 40mg OD 


BD TX 


Monitor off ABX 


Lasix 


Eliquis 


Outpatient PFTs


O2 to maintain saturation 


Will follow 





Thank you. 





Dr Azevedo

## 2017-12-05 NOTE — PN
Progress Note, Physician


Chief Complaint: 


Mr Oliva complains of shortness of breath and non-productive cough. Denies cp 

and n/v. Has chronic pain.





- Current Medication List


Current Medications: 


Active Medications





Albuterol/Ipratropium (Duoneb -)  1 amp NEB QIDR [facility]


   Last Admin: 12/05/17 06:15 Dose:  1 amp


Apixaban (Eliquis -)  5 mg PO BID [facility]


   Last Admin: 12/05/17 09:38 Dose:  5 mg


Furosemide (Lasix Injection -)  40 mg IVPUSH NOW ONE


   Stop: 12/05/17 22:16


Hydromorphone HCl (Dilaudid -)  8 mg PO Q8H PRN


   PRN Reason: PAIN


   Last Admin: 12/05/17 09:45 Dose:  8 mg


Potassium Phosphate 16 mm/ (Sodium Chloride)  255.3333 mls @ 62.5 mls/hr IVPB 

ONCE ONE


   Stop: 12/05/17 14:05


Morphine Sulfate (Ms Contin -)  30 mg PO BID [facility]


   Last Admin: 12/05/17 09:39 Dose:  30 mg


Non-Formulary Medication (Linaclotide [Linzess])  290 mcg PO DAILY [facility]


Pantoprazole Sodium (Protonix -)  40 mg PO DAILY [facility]


   Last Admin: 12/05/17 09:38 Dose:  40 mg


Ranolazine (Ranexa -)  500 mg PO BID [facility]


   Last Admin: 12/05/17 09:38 Dose:  500 mg


Tamsulosin HCl (Flomax -)  0.4 mg PO DAILY@0830 [facility]


   Last Admin: 12/05/17 08:38 Dose:  0.4 mg


Thyroid ([place] Thyroid -)  60 mg PO DAILY [facility]


   Last Admin: 12/05/17 10:59 Dose:  60 mg











- Objective


Vital Signs: 


 Vital Signs











Temperature  36.7 C   12/05/17 09:00


 


Pulse Rate  90   12/05/17 09:00


 


Respiratory Rate  18   12/05/17 09:00


 


Blood Pressure  128/69   12/05/17 09:00


 


O2 Sat by Pulse Oximetry (%)  100   12/05/17 09:00











Constitutional: Yes: Well Nourished, No Distress, Calm


Cardiovascular: Yes: Regular Rate and Rhythm.  No: Gallop, Murmur, Rub


Respiratory: Yes: Regular, On Nasal O2, Rhonchi (RLL, minor).  No: CTA 

Bilaterally, Rales, Wheezes


Gastrointestinal: Yes: Normal Bowel Sounds, Soft.  No: Distention, Tenderness


Extremities: Yes: WNL


Edema: No


Labs: 


 CBC, BMP





 12/05/17 05:22 





 12/05/17 05:22 





 INR, PTT











INR  1.38  (0.82-1.09)  H  12/04/17  16:11    














Problem List





- Problems


(1) COPD (chronic obstructive pulmonary disease)


Assessment/Plan: 


-patient presents with shortness of breath most consistent with COPD 

exacerbation


-on duonebs


-no wheezing on exam, ronchi at bases c/w atelectasis


-consult pulmonary


Code(s): J44.9 - CHRONIC OBSTRUCTIVE PULMONARY DISEASE, UNSPECIFIED   





(2) Chest pain


Assessment/Plan: 


-appears non-cardiac, suspect pleuritic secondary to cough


-cardiology consulted


-cardiac enzymes negative


-continue ranexa


Code(s): R07.9 - CHEST PAIN, UNSPECIFIED   


Qualifiers: 


   Chest pain type: unspecified   Qualified Code(s): R07.9 - Chest pain, 

unspecified   





(3) CVA (cerebral vascular accident)


Assessment/Plan: 


-history of, anticoagulated


Code(s): I63.9 - CEREBRAL INFARCTION, UNSPECIFIED   





(4) Chronic pain syndrome


Assessment/Plan: 


-continue home regimen


-will not place on IV narcotics


Code(s): G89.4 - CHRONIC PAIN SYNDROME   





(5) Factor V deficiency


Assessment/Plan: 


-continue eliquis


Code(s): D68.2 - HEREDITARY DEFICIENCY OF OTHER CLOTTING FACTORS   





(6) GERD (gastroesophageal reflux disease)


Assessment/Plan: 


-continue protonix


Code(s): K21.9 - GASTRO-ESOPHAGEAL REFLUX DISEASE WITHOUT ESOPHAGITIS

## 2017-12-06 VITALS — TEMPERATURE: 98.2 F

## 2017-12-06 VITALS — HEART RATE: 87 BPM

## 2017-12-06 VITALS — DIASTOLIC BLOOD PRESSURE: 53 MMHG | SYSTOLIC BLOOD PRESSURE: 114 MMHG

## 2017-12-06 LAB
ANION GAP SERPL CALC-SCNC: 10 MMOL/L (ref 8–16)
BASOPHILS # BLD: 0.2 % (ref 0–2)
CALCIUM SERPL-MCNC: 9 MG/DL (ref 8.5–10.1)
CO2 SERPL-SCNC: 27 MMOL/L (ref 21–32)
CREAT SERPL-MCNC: 1.1 MG/DL (ref 0.7–1.3)
DEPRECATED RDW RBC AUTO: 14.2 % (ref 11.9–15.9)
EOSINOPHIL # BLD: 0.1 % (ref 0–4.5)
GLUCOSE SERPL-MCNC: 111 MG/DL (ref 74–106)
MAGNESIUM SERPL-MCNC: 2.4 MG/DL (ref 1.8–2.4)
MCH RBC QN AUTO: 31.2 PG (ref 25.7–33.7)
MCHC RBC AUTO-ENTMCNC: 32.7 G/DL (ref 32–35.9)
MCV RBC: 95.5 FL (ref 80–96)
NEUTROPHILS # BLD: 77.3 % (ref 42.8–82.8)
PHOSPHATE SERPL-MCNC: 3.9 MG/DL (ref 2.5–4.9)
PLATELET # BLD AUTO: 150 K/MM3 (ref 134–434)
PMV BLD: 8.3 FL (ref 7.5–11.1)
WBC # BLD AUTO: 9.2 K/MM3 (ref 4–10)

## 2017-12-06 RX ADMIN — TAMSULOSIN HYDROCHLORIDE SCH MG: 0.4 CAPSULE ORAL at 09:49

## 2017-12-06 RX ADMIN — APIXABAN SCH MG: 5 TABLET, FILM COATED ORAL at 09:49

## 2017-12-06 RX ADMIN — IPRATROPIUM BROMIDE AND ALBUTEROL SULFATE SCH AMP: .5; 3 SOLUTION RESPIRATORY (INHALATION) at 11:51

## 2017-12-06 RX ADMIN — IPRATROPIUM BROMIDE AND ALBUTEROL SULFATE SCH AMP: .5; 3 SOLUTION RESPIRATORY (INHALATION) at 06:45

## 2017-12-06 RX ADMIN — Medication SCH MG: at 09:48

## 2017-12-06 RX ADMIN — MORPHINE SULFATE SCH MG: 30 TABLET, EXTENDED RELEASE ORAL at 09:48

## 2017-12-06 RX ADMIN — PANTOPRAZOLE SODIUM SCH MG: 40 TABLET, DELAYED RELEASE ORAL at 09:49

## 2017-12-06 RX ADMIN — RANOLAZINE SCH MG: 500 TABLET, FILM COATED, EXTENDED RELEASE ORAL at 09:48

## 2017-12-06 RX ADMIN — PREDNISONE SCH MG: 20 TABLET ORAL at 09:49

## 2017-12-06 NOTE — DS
Physical Examination


Vital Signs: 


 Vital Signs











Temperature  36.8 C   12/06/17 07:27


 


Pulse Rate  76   12/06/17 07:27


 


Respiratory Rate  20   12/06/17 07:29


 


Blood Pressure  114/53   12/06/17 07:27


 


O2 Sat by Pulse Oximetry (%)  95   12/06/17 07:29











Constitutional: Yes: Well Nourished, No Distress


Cardiovascular: Yes: Regular Rate and Rhythm.  No: Gallop, Murmur, Rub


Respiratory: Yes: Regular, Rhonchi (at base, unchanged).  No: CTA Bilaterally, 

Rales, Wheezes


Gastrointestinal: Yes: Normal Bowel Sounds, Soft.  No: Distention, Tenderness


Extremities: Yes: WNL


Edema: No


Labs: 


 CBC, BMP





 12/06/17 06:45 





 12/06/17 06:45 











Discharge Summary


Reason For Visit: CHEST PAIN


Current Active Problems





Chest pain (Acute)


Dyspnea (Acute)








Hospital Course: 





(1) COPD (chronic obstructive pulmonary disease)


Code(s): J44.9 - CHRONIC OBSTRUCTIVE PULMONARY DISEASE, UNSPECIFIED   





(2) Chest pain


Code(s): R07.9 - CHEST PAIN, UNSPECIFIED   


Qualifiers: 


   Chest pain type: unspecified   Qualified Code(s): R07.9 - Chest pain, 

unspecified   





(3) CVA (cerebral vascular accident)


Code(s): I63.9 - CEREBRAL INFARCTION, UNSPECIFIED   





(4) Chronic pain syndrome


Code(s): G89.4 - CHRONIC PAIN SYNDROME   





(5) Factor V deficiency


Code(s): D68.2 - HEREDITARY DEFICIENCY OF OTHER CLOTTING FACTORS   





(6) GERD (gastroesophageal reflux disease)


Code(s): K21.9 - GASTRO-ESOPHAGEAL REFLUX DISEASE WITHOUT ESOPHAGITIS   





Mr Oliva is a 77 year old male coming in with a minor COPD exacerbation and 

atypical chest pain. He was admitted under observation. He was seen by 

cardiology and evaluated, he was continued on his home regimen as pain was 

musculoskeletal in nature. He was started on duonebs and seen by pulmonary, he 

was placed on oral prednisone with improvement. He has basilar ronchi secondary 

to atelectasis and is recommended to ambulate and use incentive spirometer. He 

is stable to discharge to SNF.





32 minutes spent in preparation of this discharge


Condition: Stable





- Instructions


Diet, Activity, Other Instructions: 


up with assistance, further activity per PT at SNF. Low fat/low salt diet. 

Incentive spirometer 10x q1hwa.


Referrals: 


Benjie Meza MD [Primary Care Provider] - 


Johnathon Ramirez MD [Staff Physician] - 


Adela Dutta MD [Staff Physician] - 


Disposition: SKILLED NURSING FACILITY





- Home Medications


Comprehensive Discharge Medication List: 


Ambulatory Orders





Hydromorphone HCl [Dilaudid] 8 mg PO TID PRN 08/26/16 


Linaclotide [Linzess] 290 mcg PO DAILY 08/26/16 


Thyroid [[place] Thyroid] 60 mg PO DAILY 08/26/16 


Apixaban [Eliquis] 5 mg PO BID 04/13/17 


Omeprazole 40 mg PO DAILY 04/13/17 


Tamsulosin HCl 0.4 mg PO DAILY 04/13/17 


Ranolazine [Ranexa] 500 mg PO BID 04/14/17 


Morphine *Sr* [MS Contin -] 30 mg PO Q12H 12/04/17 


Albuterol 2.5/Ipratropium 0.5 [Duoneb -] 1 amp NEB QIDR  amp 12/06/17 


Prednisone [Deltasone -] 40 mg PO DAILY 3 Days  tablet 12/06/17

## 2017-12-06 NOTE — EKG
Test Reason : 

Blood Pressure : ***/*** mmHG

Vent. Rate : 079 BPM     Atrial Rate : 079 BPM

   P-R Int : 152 ms          QRS Dur : 088 ms

    QT Int : 402 ms       P-R-T Axes : 053 -04 014 degrees

   QTc Int : 460 ms

 

NORMAL SINUS RHYTHM

POSSIBLE LEFT ATRIAL ENLARGEMENT

T WAVE ABNORMALITY, CONSIDER ANTERIOR ISCHEMIA

RSR' PATTERN IN V1

PROLONGED QT

ABNORMAL ECG

WHEN COMPARED WITH ECG OF 13-APR-2017 21:03,

NO SIGNIFICANT CHANGE WAS FOUND

Confirmed by MD Tang Daniel ([id number]) on 12/5/2017 2:57:40 PM

Also confirmed by MD Tang Daniel ([id number]),  DAX FLOWER

([id number])  on 12/6/2017 7:53:37 AM

 

Referred By:             Confirmed By:Dax Tang MD

## 2017-12-06 NOTE — PN
Physical Exam: 


SUBJECTIVE: Patient seen and examined.





Pt reports improved breathing and cough. He still gets SOB when walking to 

bathroom. He endorses RLE pain that is 8/10 and constipation with no BM in 3 

days. 








OBJECTIVE:





 Vital Signs











 Period  Temp  Pulse  Resp  BP Sys/Abel  Pulse Ox


 


 Last 24 Hr  97.5 F-98.8 F  71-93  18-20  106-138/50-76  94-95











GENERAL: The patient is awake, alert, and fully oriented, in no acute distress.


HEENT: PEARRLA


NECK: Trachea midline, full range of motion, supple. 


LUNGS: bibasilar rales


HEART: Regular rate and rhythm, S1, S2 without murmur, rub or gallop.


ABDOMEN: Soft, nontender, nondistended, normoactive bowel sounds, no guarding, 

no 


rebound, no hepatosplenomegaly, no masses.


EXTREMITIES: decreased LE edema, RLE TTP


NEUROLOGICAL: Cranial nerves II through XII grossly intact. Normal speech, gait 

not 


observed.


PSYCH: Normal mood, normal affect.


SKIN: Warm, dry, normal turgor, no rashes or lesions noted














 Laboratory Results - last 24 hr











  12/06/17 12/06/17





  06:45 06:45


 


WBC  9.2  D 


 


RBC  4.34 


 


Hgb  13.5 


 


Hct  41.5 


 


MCV  95.5 


 


MCH  31.2 


 


MCHC  32.7 


 


RDW  14.2 


 


Plt Count  150 


 


MPV  8.3 


 


Neutrophils %  77.3 


 


Lymphocytes %  14.0 


 


Monocytes %  8.4  D 


 


Eosinophils %  0.1 


 


Basophils %  0.2 


 


Sodium   141


 


Potassium   4.3


 


Chloride   104


 


Carbon Dioxide   27


 


Anion Gap   10


 


BUN   16


 


Creatinine   1.1


 


Random Glucose   111 H D


 


Calcium   9.0


 


Phosphorus   3.9  D


 


Magnesium   2.4








Active Medications











Generic Name Dose Route Start Last Admin





  Trade Name Freq  PRN Reason Stop Dose Admin


 


Albuterol/Ipratropium  1 amp  12/04/17 22:15  12/06/17 06:45





  Duoneb -  NEB   1 amp





  QIDR MARCELO   Administration


 


Apixaban  5 mg  12/04/17 22:30  12/06/17 09:49





  Eliquis -  PO   5 mg





  BID MARCELO   Administration


 


Hydromorphone HCl  8 mg  12/04/17 22:16  12/05/17 21:43





  Dilaudid -  PO   8 mg





  Q8H PRN   Administration





  PAIN   


 


Morphine Sulfate  30 mg  12/05/17 10:00  12/06/17 09:48





  Ms Contin -  PO   30 mg





  BID MARCELO   Administration


 


Non-Formulary Medication  290 mcg  12/05/17 10:00  





  Linaclotide [Linzess]  PO   





  DAILY MARCELO   


 


Pantoprazole Sodium  40 mg  12/05/17 10:00  12/06/17 09:49





  Protonix -  PO   40 mg





  DAILY MARCELO   Administration


 


Prednisone  40 mg  12/05/17 14:30  12/06/17 09:49





  Deltasone -  PO   40 mg





  DAILY MARCELO   Administration


 


Ranolazine  500 mg  12/05/17 10:00  12/06/17 09:48





  Ranexa -  PO   500 mg





  BID MARCELO   Administration


 


Tamsulosin HCl  0.4 mg  12/05/17 08:30  12/06/17 09:49





  Flomax -  PO   0.4 mg





  DAILY@0830 MARCELO   Administration


 


Thyroid  60 mg  12/05/17 10:00  12/06/17 09:48





  [place] Thyroid -  PO   60 mg





  DAILY MARCELO   Administration











ASSESSMENT/PLAN:


77M w/ hx of COPD on home O2, PNA, CAD s/p stents, CVA w/ residual left-sided 

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 

BPH, and prostate cancer presenting with acute productive cough and SOB.





#COPD exacerbation- improving


-lasix


-duonebs


-prednisone 40mg daily


-outpt PFTs


-incentive spirometry





Rest of care per medical team





Plan discussed with attending, Dr. Ramirez.





Dispo: We will continue to follow the patient. Thank you for this consultative 

opportunity.





-Haresh Richardson MD PGY1


Pulmonology Team





Visit type





- Emergency Visit


Emergency Visit: Yes


ED Registration Date: 12/04/17


Care time: The patient presented to the Emergency Department on the above date 

and was hospitalized for further evaluation of their emergent condition.





- New Patient


This patient is new to me today: No





- Critical Care


Critical Care patient: No

## 2017-12-06 NOTE — PN
Progress Note (short form)





- Note


Progress Note: 


s:  no cp sob palps dizzy





o:


 Vital Signs











 Period  Temp  Pulse  Resp  BP Sys/Abel  Pulse Ox


 


 Last 24 Hr  97.5 F-98.8 F  71-93  18-20  106-138/50-76  94-95











nad, no jvd


rrr s1s2 no mrg


cta  bl nl eff


aaox3


no le e/c/c


abd nt nd pos bs


pos dp pt, no carotid bruits


no jaundice diaphoresis








 


 


 


 CBC, BMP





 12/06/17 06:45 





 12/06/17 06:45 





 Current Medications











Generic Name Dose Route Start Last Admin





  Trade Name Freq  PRN Reason Stop Dose Admin


 


Albuterol/Ipratropium  1 amp  12/04/17 22:15  12/06/17 06:45





  Duoneb -  NEB   1 amp





  QIDR MARCELO   Administration


 


Apixaban  5 mg  12/04/17 22:30  12/06/17 09:49





  Eliquis -  PO   5 mg





  BID MARCELO   Administration


 


Hydromorphone HCl  8 mg  12/04/17 22:16  12/05/17 21:43





  Dilaudid -  PO   8 mg





  Q8H PRN   Administration





  PAIN   


 


Morphine Sulfate  30 mg  12/05/17 10:00  12/06/17 09:48





  Ms Contin -  PO   30 mg





  BID MARCELO   Administration


 


Non-Formulary Medication  290 mcg  12/05/17 10:00  





  Linaclotide [Linzess]  PO   





  DAILY MARCELO   


 


Pantoprazole Sodium  40 mg  12/05/17 10:00  12/06/17 09:49





  Protonix -  PO   40 mg





  DAILY MARCELO   Administration


 


Prednisone  40 mg  12/05/17 14:30  12/06/17 09:49





  Deltasone -  PO   40 mg





  DAILY MARCELO   Administration


 


Ranolazine  500 mg  12/05/17 10:00  12/06/17 09:48





  Ranexa -  PO   500 mg





  BID MARCELO   Administration


 


Tamsulosin HCl  0.4 mg  12/05/17 08:30  12/06/17 09:49





  Flomax -  PO   0.4 mg





  DAILY@0830 MARCELO   Administration


 


Thyroid  60 mg  12/05/17 10:00  12/06/17 09:48





  [place] Thyroid -  PO   60 mg





  DAILY MARCELO   Administration














ecg: sr, anterior twi- similar to priors allowing for differences in lead 

placement. 


tele:   SR, artifact





cxr:   bilateral increased markings similar to priors





stress MPI 2015: NO ekg changes. Mild CP after injection. EF 58%. Nl perf


dobuta stress echo 11/2014:  75% mphr, no ischemic changes, nl lvef


mibi 10/2013:  no ischemia, nl lvef





echo 5/2016: suboptimal views.  EF 50-55%. Impaired relaxation. Nl RV (

suboptimal views). Mild ao dilation 3.8 cm with laminar atherosclerotic plaque. 





  


a/p:  76 yo m hx of CAD with multiple stents (last cath was 1/2012 at [facility]: dontae 

to om1, residual dz: 40%pLCX, 50%pLAD, 60%mLAD, 80%D1, 40%pRCA), hld, gerd, 

chronic atypical cp, copd, cva ('93, '04/residual left sided weakness), Factor 

5 leiden/PE/DVT S/P IVC Filter(2003) on xarelto, HTN, BPH, Prostate Ca, 

hypothyroid, Chronic Pain Syndrome, Anxiety, OA here with cp   





cp/sob:


- SOB in setting of URI sx's.  bnp lower than priors.  Would not recommend 

diuresis.  


-pt has hx of atypical cp but also hx of cad and pci, so symptoms are sometimes 

unclear.  Here however, appears to be msk related 2/2 cough.  


-currently no signs of acs with trop neg and no ischemic ecg changes.    


 - Low suspicion for PE given AC and IVC filter.   pulm following.  con't 

eliquis





cad, pci:


-no signs of acs


-nl lvef


-cont ranexa, ac.    





hld:


-? statin





htn:


- bp controlled off anti-hypertensives.  





Hypercoaguability


- s/p IVC filter. Continue eliquis





cardiac wise stable, can dc tele

## 2018-03-05 ENCOUNTER — HOSPITAL ENCOUNTER (INPATIENT)
Dept: HOSPITAL 74 - JER | Age: 78
LOS: 7 days | Discharge: NURSING, REHAB OR LONG-TERM CARE | DRG: 191 | End: 2018-03-12
Attending: NURSE PRACTITIONER | Admitting: INTERNAL MEDICINE
Payer: COMMERCIAL

## 2018-03-05 VITALS — BODY MASS INDEX: 30.1 KG/M2

## 2018-03-05 DIAGNOSIS — I11.0: ICD-10-CM

## 2018-03-05 DIAGNOSIS — K59.00: ICD-10-CM

## 2018-03-05 DIAGNOSIS — K21.9: ICD-10-CM

## 2018-03-05 DIAGNOSIS — I24.8: ICD-10-CM

## 2018-03-05 DIAGNOSIS — I69.354: ICD-10-CM

## 2018-03-05 DIAGNOSIS — E03.9: ICD-10-CM

## 2018-03-05 DIAGNOSIS — E78.5: ICD-10-CM

## 2018-03-05 DIAGNOSIS — N40.0: ICD-10-CM

## 2018-03-05 DIAGNOSIS — J44.1: Primary | ICD-10-CM

## 2018-03-05 DIAGNOSIS — Z98.61: ICD-10-CM

## 2018-03-05 DIAGNOSIS — I82.4Z3: ICD-10-CM

## 2018-03-05 DIAGNOSIS — J47.9: ICD-10-CM

## 2018-03-05 DIAGNOSIS — I47.1: ICD-10-CM

## 2018-03-05 DIAGNOSIS — J84.9: ICD-10-CM

## 2018-03-05 DIAGNOSIS — I50.32: ICD-10-CM

## 2018-03-05 DIAGNOSIS — D68.2: ICD-10-CM

## 2018-03-05 DIAGNOSIS — I25.10: ICD-10-CM

## 2018-03-05 LAB
ALBUMIN SERPL-MCNC: 3 G/DL (ref 3.4–5)
ALP SERPL-CCNC: 73 U/L (ref 45–117)
ALT SERPL-CCNC: 18 U/L (ref 12–78)
ANION GAP SERPL CALC-SCNC: 12 MMOL/L (ref 8–16)
APPEARANCE UR: (no result)
AST SERPL-CCNC: 10 U/L (ref 15–37)
BASOPHILS # BLD: 0.5 % (ref 0–2)
BILIRUB SERPL-MCNC: 0.3 MG/DL (ref 0.2–1)
BILIRUB UR STRIP.AUTO-MCNC: NEGATIVE MG/DL
BUN SERPL-MCNC: 17 MG/DL (ref 7–18)
CALCIUM SERPL-MCNC: 8.7 MG/DL (ref 8.5–10.1)
CHLORIDE SERPL-SCNC: 102 MMOL/L (ref 98–107)
CO2 SERPL-SCNC: 27 MMOL/L (ref 21–32)
COLOR UR: YELLOW
CREAT SERPL-MCNC: 1.3 MG/DL (ref 0.7–1.3)
DEPRECATED RDW RBC AUTO: 14.5 % (ref 11.9–15.9)
EOSINOPHIL # BLD: 1.3 % (ref 0–4.5)
GLUCOSE SERPL-MCNC: 109 MG/DL (ref 74–106)
HCT VFR BLD CALC: 42 % (ref 35.4–49)
HGB BLD-MCNC: 14.1 GM/DL (ref 11.7–16.9)
INR BLD: 1.6 (ref 0.82–1.09)
KETONES UR QL STRIP: NEGATIVE
LEUKOCYTE ESTERASE UR QL STRIP.AUTO: NEGATIVE
LIPASE SERPL-CCNC: 115 U/L (ref 73–393)
LYMPHOCYTES # BLD: 14.4 % (ref 8–40)
MCH RBC QN AUTO: 31.8 PG (ref 25.7–33.7)
MCHC RBC AUTO-ENTMCNC: 33.6 G/DL (ref 32–35.9)
MCV RBC: 94.8 FL (ref 80–96)
MONOCYTES # BLD AUTO: 12.5 % (ref 3.8–10.2)
NEUTROPHILS # BLD: 71.3 % (ref 42.8–82.8)
NITRITE UR QL STRIP: NEGATIVE
PH BLDV: 7.37 [PH] (ref 7.32–7.42)
PH UR: 7 [PH] (ref 5–8)
PLATELET # BLD AUTO: 196 K/MM3 (ref 134–434)
PMV BLD: 8.1 FL (ref 7.5–11.1)
POTASSIUM SERPLBLD-SCNC: 4.4 MMOL/L (ref 3.5–5.1)
PROT SERPL-MCNC: 6.5 G/DL (ref 6.4–8.2)
PROT UR QL STRIP: NEGATIVE
PROT UR QL STRIP: NEGATIVE
PT PNL PPP: 18.1 SEC (ref 9.98–11.88)
RBC # BLD AUTO: 4.43 M/MM3 (ref 4–5.6)
RBC # UR STRIP: NEGATIVE /UL
SODIUM SERPL-SCNC: 141 MMOL/L (ref 136–145)
SP GR UR: 1.01 (ref 1–1.03)
UROBILINOGEN UR STRIP-MCNC: 2 MG/DL (ref 0.2–1)
VENOUS PC02: 53.1 MMHG (ref 38–52)
VENOUS PO2: 17.3 MMHG (ref 28–48)
WBC # BLD AUTO: 6.4 K/MM3 (ref 4–10)

## 2018-03-05 RX ADMIN — DILTIAZEM HYDROCHLORIDE SCH MG: 30 TABLET, FILM COATED ORAL at 21:51

## 2018-03-05 RX ADMIN — IPRATROPIUM BROMIDE AND ALBUTEROL SULFATE SCH AMP: .5; 3 SOLUTION RESPIRATORY (INHALATION) at 20:30

## 2018-03-05 RX ADMIN — APIXABAN SCH MG: 5 TABLET, FILM COATED ORAL at 21:51

## 2018-03-05 RX ADMIN — ASPIRIN 81 MG SCH MG: 81 TABLET ORAL at 20:42

## 2018-03-05 RX ADMIN — RANOLAZINE SCH MG: 500 TABLET, FILM COATED, EXTENDED RELEASE ORAL at 21:51

## 2018-03-05 NOTE — PDOC
History of Present Illness





<Renzo Wilson - Last Filed: 03/05/18 12:20>





- General


History Source: Patient


Exam Limitations: No Limitations





- History of Present Illness


Initial Comments: 





03/05/18 10:42


76y M hx of htn, hl, cad s/p stents x 2, factor v leiden, cva x 2, 

diverticulitis, dvt on eliquis, gerd, kidney stones, presents from home for 

sob. Pt states that he has been having about 1 week of sob, mild cough prod of 

whitish sputum as well as sore throat. Pt also endorses some lower abdominal 

pain and chest pain. Pt states he was seen at [facility] a few days ago 

for the same and was admitted for 6 days but is unclear what they were doing.  

Pt denies any fever/chills, diarrhea, bpr, n/v, palpitations.  Pt in general is 

nto a great historian, sometimes contradicts his history. 





pt states he came here today because his sob feels a bit worse than yesterday.





PMD: dr. Martinez


Card: Rufino








<Rome Rodriguez - Last Filed: 03/05/18 14:25>





- General


Chief Complaint: Chest Pain


Stated Complaint: CHEST PAIN


Time Seen by Provider: 03/05/18 10:23





Past History





<Renzo Wilson - Last Filed: 03/05/18 12:20>





- Past Medical History


Anemia: No


Asthma: No


Cancer: Yes (PROSTATE)


Cardiac Disorders: Yes (STENTS, FACTOR 5, CLOTTING DISORDER)


CVA: Yes (X2 '93 / '04 / (L) WEAKNESS)


COPD: Yes


CHF: No


Dementia: No


Diabetes: No


GI Disorders: Yes (diverticulosis,gerd)


 Disorders: Yes (kidney stone)


HTN: Yes


Hypercholesterolemia: Yes


Kidney Stones: Yes


Liver Disease: No


Seizures: No


Thyroid Disease: No





- Surgical History


Abdominal Surgery: No


Appendectomy: No


Cardiac Surgery: Yes (2 STENTS, SUJIT FILTER)


Cholecystectomy: No


Lung Surgery: No


Neurologic Surgery: No





- Immunization History


Immunization Up to Date: Yes





- Suicide/Smoking/Psychosocial Hx


Smoking Status: No


Smoking History: Never smoked


Have you smoked in the past 12 months: No


Number of Cigarettes Smoked Daily: 0


Hx Alcohol Use: Yes


Drug/Substance Use Hx: No


Substance Use Type: None


Hx Substance Use Treatment: No





<Rome Rodriguez - Last Filed: 03/05/18 14:25>





- Past Medical History


Allergies/Adverse Reactions: 


 Allergies











Allergy/AdvReac Type Severity Reaction Status Date / Time


 


No Known Allergies Allergy   Verified 03/05/18 10:12











Home Medications: 


Ambulatory Orders





Hydromorphone HCl [Dilaudid] 8 mg PO TID PRN 08/26/16 


Linaclotide [Linzess] 290 mcg PO DAILY 08/26/16 


Thyroid [[place] Thyroid] 60 mg PO DAILY 08/26/16 


Apixaban [Eliquis] 5 mg PO BID 04/13/17 


Omeprazole 40 mg PO DAILY 04/13/17 


Tamsulosin HCl 0.4 mg PO DAILY 04/13/17 


Ranolazine [Ranexa] 500 mg PO BID 04/14/17 


Morphine *Sr* [MS Contin -] 30 mg PO Q12H 12/04/17 


predniSONE [Deltasone -] 40 mg PO DAILY 3 Days  tablet 12/06/17 


Albuterol 2.5/Ipratropium 0.5 [Duoneb -] 1 amp NEB PRN PRN 03/05/18 











**Review of Systems





- Review of Systems


Able to Perform ROS?: Yes


Comments:: 





03/05/18 10:46


All other systems reviewed and are negative except noted in HPI





<Rome Rodriguez - Last Filed: 03/05/18 14:25>





*Physical Exam





- Vital Signs


 Last Vital Signs











Temp Pulse Resp BP Pulse Ox


 


 97.6 F   77   19   93/60   97 


 


 03/05/18 10:12  03/05/18 10:49  03/05/18 10:12  03/05/18 10:12  03/05/18 10:49














<Renzo Wilson - Last Filed: 03/05/18 12:20>





- Vital Signs


 Last Vital Signs











Temp Pulse Resp BP Pulse Ox


 


 97.6 F   79   19   93/60   97 


 


 03/05/18 10:12  03/05/18 10:12  03/05/18 10:12  03/05/18 10:12  03/05/18 10:12














- Physical Exam


Comments: 





03/05/18 10:49


GENERAL: The patient is awake, alert, and oriented x3, Nontoxic - in no acute 

distress.


HEAD: Normocephalic, atraumatic.


EYES: extraocular movements intact, sclera anicteric, conjunctiva clear.


ENT: Normal voice,  dry mucous membranes.


NECK: Normal range of motion, supple


LUNGS: Breath sounds equal, clear to auscultation bilaterally.  No wheezes, no 

rhonchi, no rales.


HEART: Regular rate and rhythm, normal S1 and S2 without murmur, rub or gallop.


ABDOMEN: mild b/l lower abdominal tenderness,  No guarding, no rebound. No CVA 

tenderness


EXTREMITIES: Normal range of motion, b/l LE edema with mild calf tenderness


NEUROLOGICAL: No facial assymetry, Normal speech, moving all 4 extremities 

spontenaously and symmetrically 


PSYCH: Normal mood, normal affect.


SKIN: Warm, Dry, normal turgor,








<Rome Rodriguez - Last Filed: 03/05/18 14:25>





**Heart Score/ECG Review





- ECG Impressions


Comment:: 





03/05/18 12:09


Twelve-lead EKG was performed and reviewed by me. 


There is normal sinus rhythm with a normal rate. 


Rate of 77


Axis is normal


No ST changes suggestive of acute ischemia.











<Rome Rodriguez - Last Filed: 03/05/18 14:25>





ED Treatment Course





- LABORATORY


CBC & Chemistry Diagram: 


 03/05/18 11:07





 03/05/18 11:34





- ADDITIONAL ORDERS


Additional order review: 


 Laboratory  Results











  03/05/18 03/05/18 03/05/18





  11:34 11:34 11:34


 


PT with INR   


 


INR   


 


VBG pH   7.37 


 


POC VBG pCO2   53.1 H 


 


POC VBG pO2   17.3 L* 


 


Mixed VBG HCO3   30.0 H 


 


Sodium    141


 


Potassium    4.4


 


Chloride    102


 


Carbon Dioxide    27


 


Anion Gap    12


 


BUN    17


 


Creatinine    1.3


 


Creat Clearance w eGFR    53.53


 


Random Glucose    109 H


 


Lactic Acid  1.6  


 


Calcium    8.7


 


Total Bilirubin    0.3  D


 


AST    10 L D


 


ALT    18  D


 


Alkaline Phosphatase    73


 


Creatine Kinase    30 L


 


Troponin I    0.58 H D


 


Total Protein    6.5


 


Albumin    3.0 L


 


Lipase    115














  03/05/18





  11:34


 


PT with INR  18.10 H


 


INR  1.60 H


 


VBG pH 


 


POC VBG pCO2 


 


POC VBG pO2 


 


Mixed VBG HCO3 


 


Sodium 


 


Potassium 


 


Chloride 


 


Carbon Dioxide 


 


Anion Gap 


 


BUN 


 


Creatinine 


 


Creat Clearance w eGFR 


 


Random Glucose 


 


Lactic Acid 


 


Calcium 


 


Total Bilirubin 


 


AST 


 


ALT 


 


Alkaline Phosphatase 


 


Creatine Kinase 


 


Troponin I 


 


Total Protein 


 


Albumin 


 


Lipase 








 











  03/05/18





  11:07


 


RBC  4.43


 


MCV  94.8


 


MCHC  33.6


 


RDW  14.5


 


MPV  8.1


 


Neutrophils %  71.3


 


Lymphocytes %  14.4


 


Monocytes %  12.5 H


 


Eosinophils %  1.3  D


 


Basophils %  0.5














- Medications


Given in the ED: 


ED Medications














Discontinued Medications














Generic Name Dose Route Start Last Admin





  Trade Name Vincenzo  PRN Reason Stop Dose Admin


 


Sodium Chloride  1,000 mls @ 1,000 mls/hr  03/05/18 10:40  03/05/18 11:55





  Normal Saline -  IV  03/05/18 11:39  1,000 mls/hr





  .Q1H ONE   Administration


 


Ondansetron HCl  4 mg  03/05/18 10:40  03/05/18 11:55





  Zofran Injection  IVPB  03/05/18 10:41  4 mg





  ONCE ONE   Administration














<Renzo Wilson - Last Filed: 03/05/18 12:20>





- LABORATORY


CBC & Chemistry Diagram: 


 03/05/18 11:07





 03/05/18 11:34





- RADIOLOGY


Radiology Studies Ordered: 














 Category Date Time Status


 


 CHEST X-RAY PORTABLE* [RAD] Stat Radiology  03/05/18 10:40 Ordered














<Rome Rodriguez - Last Filed: 03/05/18 14:25>





Medical Decision Making





- Medical Decision Making








03/05/18 12:20


Call placed to Dr. Luke at 12:20 pm. Case discussed.








<Renzo Wilson - Last Filed: 03/05/18 12:20>





- Medical Decision Making





03/05/18 10:50





77y M here with what seems like cold like symptoms, but seems a bit more 

comfortable than i would expect and is alittle agitated


pt is not a great historia, (?due to old cva?) will attempt to find out more 

from [facility] and then from dr. martinez 














03/05/18 10:57


[facility]


d/c yesterday for evaluation of acute on crhonic dvt, no PE (CTA on 2/23, 

unchanged filling defect from prior 2015, no acute PE, changes c/w 

intersititial lung disease, +extensive coronary disease, b/l DVT on doplers)


treated for COPD/CHF


cleared by card/pulm


pt refused to leave 3/1


given home O2 








03/05/18 13:10


pts labs reviewed


noted for trop of .58


pt tates feeling a bit better, denies current cp


pt took his eliquis this AM


will give asa


case dw. dr. luke


agree with manageemnt, will admit for further management to tele





aaitng for dr. treadwell for admission





CRITICAL CARE DOCUMENTATION:





I spent ~35 minutes of Critical Care time, excluding separately billable 

procedures, involving high complexity decision making to assess, manipulate and 

support vital system function(s) to treat single or multiple vital organ system 

failure and/or to prevent further life threatening deterioration of the patient'

s condition.


03/05/18 14:25





discussed NP Joanne, jeremie ith admission under dr. rothman for evaluation of 

NSTEMI


stable for tele   





Case discussed in detail with admitting physician including history, physical 

exam and ancillary studies.


Admitting physician has assumed care for the patient, will follow all pending 

diagnostics and will complete the evaluation and treatment. 








<Rome Rodriguez - Last Filed: 03/05/18 14:25>





*DC/Admit/Observation/Transfer





- Attestations


Scribe Attestion: 





03/05/18 12:21





Documentation prepared by Renzo Wilson, acting as medical scribe for Rome Rodriguez MD.








<Renzo Wilson - Last Filed: 03/05/18 12:20>





- Discharge Dispostion


Admit: Yes





<Rome Rodriguez - Last Filed: 03/05/18 14:25>


Diagnosis at time of Disposition: 


 NSTEMI (non-ST elevated myocardial infarction), SOB (shortness of breath)








- Discharge Dispostion


Condition at time of disposition: Stable

## 2018-03-05 NOTE — HP
CHIEF COMPLAINT: chest pain, shortness of breath





PCP:  Dr. Meza





HISTORY OF PRESENT ILLNESS:





Patient is a 77 year old male with a significant past medical history of CVA, 

CAD s/p stents x 2, factor V leiden, diverticulitis, DVT (on Elliquis 5mg BID), 

GERD, kidney stones, MI, hypertension and COPD (home oxygen dependent)   He 

presents to the ED today with complaints of shortness of breath x 1 week with 

cough of white sputum with a sore throat.  Patient also endorses some lower 

abdominal pain and chest pain. Patient states he was seen at [facility] 

a few days ago for the same complaints and was admitted for 6 days and was seen 

by both a cardiologist and pulmonogist.  Pt denies any fever/chills, diarrhea, 

nausea or vomiting.   Patient states he came here today because his sob feels a 

bit worse than yesterday.





As per ED attending, patient was evaluated at [place] for evaluation of 

chronic DVT.  A CTA was done on 2/23/18 which showned no acute PE. but was 

consistent with interstitial lung disease, extensive coronary artery disease, 

bilateral DVT seen on dopplers imaging.  During hospitalization patient was 

treated for COPD exacerbation and CHF.  He was given home oxygen on discharge.





Patient gave verbal consent to retrieve records at [facility].  





ER course was notable for:


(1) Trop 0.58


(2) bnp 236


(3) chest xray, left lower lobe infiltrate





Recent Travel:





PAST MEDICAL HISTORY: CVA, CAD s/p stents x 2, factor V leiden, diverticulitis, 

DVT (on Elliquis 5mg BID), GERD, kidney stones, MI, hypertension and COPD (home 

oxygen dependent)





PAST SURGICAL HISTORY:





Social History:


Smoking: 


Alcohol: 


Drugs: 





Family History:


Allergies





No Known Allergies Allergy (Verified 03/05/18 10:12)


 





HOME MEDICATIONS:


 Home Medications











 Medication  Instructions  Recorded


 


Hydromorphone HCl [Dilaudid] 8 mg PO TID PRN 08/26/16


 


Linaclotide [Linzess] 290 mcg PO DAILY 08/26/16


 


Thyroid [[place] Thyroid] 60 mg PO DAILY 08/26/16


 


Apixaban [Eliquis] 5 mg PO BID 04/13/17


 


Omeprazole 40 mg PO DAILY 04/13/17


 


Tamsulosin HCl 0.4 mg PO DAILY 04/13/17


 


Ranolazine [Ranexa] 500 mg PO BID 04/14/17


 


Morphine *Sr* [MS Contin -] 30 mg PO Q12H 12/04/17


 


predniSONE [Deltasone -] 40 mg PO DAILY 3 Days  tablet 12/06/17


 


Albuterol 2.5/Ipratropium 0.5 1 amp NEB PRN PRN 03/05/18





[Duoneb -]  


 


Digoxin [Lanoxin -] 0.25 mg PO DAILY 03/05/18


 


Diltiazem [Cardizem -] 30 mg PO TID 03/05/18








REVIEW OF SYSTEMS


CARDIOVASCULAR: 


Absent: syncope, palpitations, lightheadedness, peripheral edema


RESPIRATORY: 


Absent:  wheezing, stridor, hemoptysis


GASTROINTESTINAL:


Absent: nausea, vomiting, diarrhea, constipation, melena, hematochezia


GENITOURINARY: 


Absent: dysuria, frequency, urgency, hesitancy, hematuria, flank pain, genital 

pain


MUSCULOSKELETAL: 


Absent: myalgia, arthralgia, joint swelling, back pain, neck pain


ENDOCRINE:


Absent: unexplained weight gain, unexplained weight loss, heat intolerance, 

cold intolerance


NEUROLOGIC: 


Absent: headache, focal weakness or paresthesias, dizziness, unsteady gait, 

seizure, mental status changes, bladder or bowel incontinence


PSYCHIATRIC: 


Absent: anxiety, depression, suicidal or homicidal ideation, hallucinations.





PHYSICAL EXAMINATION


 Vital Signs - 24 hr











  03/05/18 03/05/18 03/05/18





  10:12 10:49 12:39


 


Temperature 97.6 F  


 


Pulse Rate 79 77 


 


Pulse Rate [   74





Apical]   


 


Respiratory 19  18





Rate   


 


Blood Pressure 93/60  


 


Blood Pressure   102/60





[Left Arm]   


 


O2 Sat by Pulse 97 97 98





Oximetry (%)   














  03/05/18





  14:23


 


Temperature 


 


Pulse Rate 80


 


Pulse Rate [ 80





Apical] 


 


Respiratory 18





Rate 


 


Blood Pressure 


 


Blood Pressure 106/74





[Left Arm] 


 


O2 Sat by Pulse 99





Oximetry (%) 








GENERAL: Awake, alert, and fully oriented, in no mild respiratory distress.


HEAD: Normal with no signs of trauma.


EYES: Pupils equal, round and reactive to light, extraocular movements intact, 

sclera anicteric, conjunctiva clear. No lid lag.


EARS, NOSE, THROAT: Ears normal, nares patent, oropharynx clear without 

exudates. Moist mucous membranes.


NECK: Normal range of motion, supple without lymphadenopathy, JVD, or masses.


LUNGS: left lower lobe + crackles at the bases, right lung clear/diminished, no 

wheezing


HEART: Regular rate and rhythm, normal S1 and S2 without murmur, rub or gallop.


ABDOMEN: Soft, nontender, not distended, normoactive bowel sounds, no guarding, 

no rebound, no masses.  


MUSCULOSKELETAL: Normal range of motion at all joints. No bony deformities or 

tenderness. No CVA tenderness.


UPPER EXTREMITIES: No peripheral edema.


LOWER EXTREMITIES: trace edema bilaterally


NEUROLOGICAL:  Normal speech. Normal steady gait.


PSYCHIATRIC: Cooperative. Good eye contact. Appropriate mood and affect.


 Laboratory Results - last 24 hr











  03/05/18 03/05/18 03/05/18





  11:07 11:34 11:34


 


WBC  6.4  D  


 


RBC  4.43  


 


Hgb  14.1  


 


Hct  42.0  


 


MCV  94.8  


 


MCH  31.8  


 


MCHC  33.6  


 


RDW  14.5  


 


Plt Count  196  D  


 


MPV  8.1  


 


Neutrophils %  71.3  


 


Lymphocytes %  14.4  


 


Monocytes %  12.5 H  


 


Eosinophils %  1.3  D  


 


Basophils %  0.5  


 


PT with INR   18.10 H 


 


INR   1.60 H 


 


VBG pH   


 


POC VBG pCO2   


 


POC VBG pO2   


 


Mixed VBG HCO3   


 


Sodium    141


 


Potassium    4.4


 


Chloride    102


 


Carbon Dioxide    27


 


Anion Gap    12


 


BUN    17


 


Creatinine    1.3


 


Creat Clearance w eGFR    53.53


 


Random Glucose    109 H


 


Lactic Acid   


 


Calcium    8.7


 


Total Bilirubin    0.3  D


 


AST    10 L D


 


ALT    18  D


 


Alkaline Phosphatase    73


 


Creatine Kinase    30 L


 


Troponin I    0.58 H D


 


B-Natriuretic Peptide    236.80


 


Total Protein    6.5


 


Albumin    3.0 L


 


Lipase    115


 


Urine Color   


 


Urine Appearance   


 


Urine pH   


 


Ur Specific Gravity   


 


Urine Protein   


 


Urine Glucose (UA)   


 


Urine Ketones   


 


Urine Blood   


 


Urine Nitrite   


 


Urine Bilirubin   


 


Urine Urobilinogen   


 


Ur Leukocyte Esterase   














  03/05/18 03/05/18 03/05/18





  11:34 11:34 12:39


 


WBC   


 


RBC   


 


Hgb   


 


Hct   


 


MCV   


 


MCH   


 


MCHC   


 


RDW   


 


Plt Count   


 


MPV   


 


Neutrophils %   


 


Lymphocytes %   


 


Monocytes %   


 


Eosinophils %   


 


Basophils %   


 


PT with INR   


 


INR   


 


VBG pH  7.37  


 


POC VBG pCO2  53.1 H  


 


POC VBG pO2  17.3 L*  


 


Mixed VBG HCO3  30.0 H  


 


Sodium   


 


Potassium   


 


Chloride   


 


Carbon Dioxide   


 


Anion Gap   


 


BUN   


 


Creatinine   


 


Creat Clearance w eGFR   


 


Random Glucose   


 


Lactic Acid   1.6 


 


Calcium   


 


Total Bilirubin   


 


AST   


 


ALT   


 


Alkaline Phosphatase   


 


Creatine Kinase   


 


Troponin I   


 


B-Natriuretic Peptide    Cancelled


 


Total Protein   


 


Albumin   


 


Lipase   


 


Urine Color   


 


Urine Appearance   


 


Urine pH   


 


Ur Specific Gravity   


 


Urine Protein   


 


Urine Glucose (UA)   


 


Urine Ketones   


 


Urine Blood   


 


Urine Nitrite   


 


Urine Bilirubin   


 


Urine Urobilinogen   


 


Ur Leukocyte Esterase   














  03/05/18





  13:05


 


WBC 


 


RBC 


 


Hgb 


 


Hct 


 


MCV 


 


MCH 


 


MCHC 


 


RDW 


 


Plt Count 


 


MPV 


 


Neutrophils % 


 


Lymphocytes % 


 


Monocytes % 


 


Eosinophils % 


 


Basophils % 


 


PT with INR 


 


INR 


 


VBG pH 


 


POC VBG pCO2 


 


POC VBG pO2 


 


Mixed VBG HCO3 


 


Sodium 


 


Potassium 


 


Chloride 


 


Carbon Dioxide 


 


Anion Gap 


 


BUN 


 


Creatinine 


 


Creat Clearance w eGFR 


 


Random Glucose 


 


Lactic Acid 


 


Calcium 


 


Total Bilirubin 


 


AST 


 


ALT 


 


Alkaline Phosphatase 


 


Creatine Kinase 


 


Troponin I 


 


B-Natriuretic Peptide 


 


Total Protein 


 


Albumin 


 


Lipase 


 


Urine Color  Yellow


 


Urine Appearance  Slcloudy


 


Urine pH  7.0


 


Ur Specific Gravity  1.013


 


Urine Protein  Negative


 


Urine Glucose (UA)  Negative


 


Urine Ketones  Negative


 


Urine Blood  Negative


 


Urine Nitrite  Negative


 


Urine Bilirubin  Negative


 


Urine Urobilinogen  2.0


 


Ur Leukocyte Esterase  Negative











ASSESSMENT/PLAN:





Patient is a 77 year old male with a significant past medical history of CVA 

1993 with left sided weakness, CAD s/p stents x 2, factor V leiden, 

diverticulitis, DVT (on Elliquis 5mg BID), GERD, kidney stones, MI, 

hypertension and COPD (home oxygen dependent)   He presents to the ED today 

with complaints of shortness of breath x 1 week with cough of white sputum with 

a sore throat.  Patient also endorses some lower abdominal pain and chest pain. 

Patient states he was seen at [facility] a few days ago for the same 

complaints and was admitted for 6 days and was seen by both a cardiologist and 

pulmonogist.  Pt denies any fever/chills, diarrhea, nausea or vomiting.   

Patient states he came here today because his sob feels a bit worse than 

yesterday.





As per ED attending, patient was evaluated at [place] for evaluation of 

chronic DVT.  A CTA was done on 2/23/18 which showed no acute PE. but was 

consistent with interstitial lung disease, extensive coronary artery disease, 

and bilateral DVT seen on dopplers imaging.  During hospitalization patient was 

treated for COPD exacerbation and CHF.  He was given home oxygen on discharge.





Patient reports last cardiac cath was in 2012.  


Patient gave verbal consent to retrieve records at [facility].  





Imaging:


Chest xray 3/6:  left lower lobe infiltrate


Echo 3/5/18: e/a reversal consistent with poor LV fx, trace MR, trace to mild 

tricuspid regurg., mild aortic root dilattion, trivial pericardial effusion.





ID:


Sepsis, rule out


Left lower infiltrate seen on chest xray


Hypotension on admission, hypoxia


Feels flushed, subjective fevers @ home


Monitor vitals, no fevers noted, WBC normal


Maintain oxygen sats 90% or above


Blood and urine cultures ordered


ID consult 





Pulmonary


Shortness of breath, acute on chronic


COPD exacerbation


Rule out interstitial lung disease


+ crackles on left lung base


On home oxygen which was given on discharge from [place]


Trend trops 0.58


On supplemental oxygen @ 2-3 liters


Maintain oxygen saturations 90% or greater


On daily prednisone 40mg


Pulmonary consult





Muscular/Skeletal


Back pain, generalized pain, chronic


will continue his home regimen as pain is musculoskeletal





Cardiology:


CAD s/p stents x 2


DVT history


On Eliquis 5mg BID





Hypertension, chronic


On Coreq BID 


Also on Diliatizem


Awaiting records from [place] 





Hematology:


Factor 5 leiden


PE, DVT history


IVC filter


On Eliquis 5mg BID





F.E.N.


Fluids: Po adequate


Electrolytes: monitor


Nutrition: low sodium diet





Prophy:


On Eliquis





Disposition:  full code. 











Visit type





- Emergency Visit


Emergency Visit: Yes


ED Registration Date: 03/05/18


Care time: The patient presented to the Emergency Department on the above date 

and was hospitalized for further evaluation of their emergent condition.





- New Patient


This patient is new to me today: Yes


Date on this admission: 03/10/18





- Critical Care


Critical Care patient: No





Hospitalist Screening





- Colonoscopy Questionnaire


Colonoscopy Questionnaire: 





Colonoscopy Questionnaire








-   Patient:


50 - 75 years old and never had a screening colonoscopy: Unknown


History of colon or rectal polyps, or CA: Unknown


History of IBD, Crohn's disease or UC: Unknown


History of abdominal radiation therapy as a child: Unknown





-   Relative:


1 with colon or rectal CA, or polyps at age 60 or younger: Unknown


Colon or rectal CA diagnosed at age 45 or younger: Unknown


Multiple relatives with colon or rectal CA: Unknown





-   Outcome:


Screening Result: Negative Screen

## 2018-03-05 NOTE — CON.CARD
Cardiology Consult (text)





- Consultation


Consultation Note: 








cc:  cp/sob





hpi:  76 yo m hx of CAD with multiple stents [place] [facility] [place] 

[place], residual dz: 40%pLCX, 50%pLAD, 60%mLAD, 80%D1, 40%pRCA), htn, hld, 

chronic atypical cp, copd, cva ('93, '04/residual left sided weakness), Factor 

5 leiden/PE/DVT S/P IVC Filter(2003) on eliquis, gerd, gastroparesis, BPH, 

Prostate Ca, hypothyroid, Chronic Pain Syndrome, Anxiety, OA here with sob  





worsened sob since discharge from [facility] the day before yesterday.  He states that 

he was too wiped out after getting medications from pharmacy and he did not 

take his medications yesterday.  This morning woke up with worsened sob.  





Pt has chronic atypical cp, sometimes  related to gerd.   Current pain is 

reproducible to palpation.   + abd distension.  + chronic pain diffuse 

throughout body, stable.  + le edema, unclear if worsened.  





Poor  historian.  





As per ED attending, patient was evaluated at [facility] for evaluation of chronic 

DVT.  A CTA was done on 2/23/18 which showned no acute PE. but was consistent 

with interstitial lung disease, extensive coronary artery disease, bilateral 

DVT seen on dopplers imaging.  During hospitalization patient was treated for 

COPD exacerbation and CHF.  He was given home oxygen on discharge.





Was discharged on new diltiazem and digoxin --> unclear why, ? possible afib?.  





No f/c/s, n/v/d, weight change, h/a, rashes. 


No palps, dizzy, loc, pnd, orthopnea.  





Sees dr luke for cardio.





pmh/psh: per hpi


social: never tob, but smoke exposure working as a . 


fam:  parents with thoracic aortic aneurysms


ros: per hpi





meds:











Ambulatory Orders





Hydromorphone HCl [Dilaudid] 8 mg PO TID PRN 08/26/16 


Linaclotide [Linzess] 290 mcg PO DAILY 08/26/16 


Thyroid [[place] Thyroid] 90 mg PO DAILY 08/26/16 


Apixaban [Eliquis] 5 mg PO BID 04/13/17 


Omeprazole 40 mg PO DAILY 04/13/17 


Tamsulosin HCl 0.4 mg PO DAILY 04/13/17 


Ranolazine [Ranexa] 500 mg PO BID 04/14/17 


Morphine *Sr* [MS Contin -] 30 mg PO Q12H PRN 12/04/17 


predniSONE [Deltasone -] 40 mg PO DAILY 3 Days  tablet 12/06/17 


Albuterol 2.5/Ipratropium 0.5 [Duoneb -] 1 amp NEB PRN PRN 03/05/18 


Digoxin [Lanoxin -] 0.25 mg PO DAILY 03/05/18 


Diltiazem [Cardizem -] 60 mg PO TID 03/05/18 


Fluticasone Prop 0.05% Nasal [Flonase -] 1 spray NS BID 03/05/18 





 Most recent office note lists the following cardiac meds: crestor 20 mg/day, 

eliquis 5 bid, lasix 40 mg/day, imdur 30 mg/day, toprol 25 bid, praluent?, 

ranexa 500 bid,  


 Current Medications





Albuterol/Ipratropium (Duoneb -)  1 amp NEB PRN PRN


   PRN Reason: sob


Apixaban (Eliquis -)  5 mg PO BID MARCELO


Digoxin (Lanoxin -)  0.25 mg PO DAILY [facility]


Diltiazem HCl (Cardizem -)  30 mg PO TID [facility]


Morphine Sulfate (Ms Contin -)  30 mg PO Q12H [facility]


   Last Admin: 03/05/18 16:40 Dose:  30 mg


Non-Formulary Medication (Hydromorphone Hcl [Dilaudid])  8 mg PO TID PRN


   PRN Reason: PAIN


Non-Formulary Medication (Omeprazole)  40 mg PO DAILY [facility]


Prednisone (Deltasone -)  40 mg PO DAILY [facility]


Ranolazine (Ranexa -)  500 mg PO BID [facility]


Tamsulosin HCl (Flomax -)  0.4 mg PO DAILY [facility]


Thyroid ([place] Thyroid -)  60 mg PO DAILY [facility]

















pe:


 


 


 


 Vital Signs - 24 hr











  03/05/18 03/05/18 03/05/18





  10:12 10:49 12:39


 


Temperature 97.6 F  


 


Pulse Rate 79 77 


 


Pulse Rate [   74





Apical]   


 


Respiratory 19  18





Rate   


 


Blood Pressure 93/60  


 


Blood Pressure   102/60





[Left Arm]   


 


O2 Sat by Pulse 97 97 98





Oximetry (%)   














  03/05/18 03/05/18 03/05/18





  14:23 17:30 18:23


 


Temperature   


 


Pulse Rate 80 73 


 


Pulse Rate [ 80  78





Apical]   


 


Respiratory 18 18 18





Rate   


 


Blood Pressure  124/64 


 


Blood Pressure 106/74  110/57





[Left Arm]   


 


O2 Sat by Pulse 99 97 99





Oximetry (%)   

















nad, calm


jvd tds but does not appear elevated. 


rrr s1s2 no mrg


bibasilar rales. nl effort


aaox3


trace le edema.  no c/c


abd nt nd pos bs


pos dp pt, no carotid bruits


no jaundice diaphoresis








 


 


 


 CBC, BMP





 03/05/18 11:07 





 03/05/18 11:34 








 Laboratory Tests











  02/21/17





  15:15


 


Monocytes % 


 


Lactic Acid 


 


Magnesium 


 


Total Bilirubin 


 


AST 


 


ALT 


 


Alkaline Phosphatase 


 


Creatine Kinase 


 


Troponin I 


 


B-Natriuretic Peptide 


 


Lipase 


 


TSH  2.42  D














  12/05/17 12/05/17 03/05/18





  00:30 05:22 11:07


 


Monocytes %    12.5 H


 


Lactic Acid   


 


Magnesium   2.5 H 


 


Total Bilirubin   


 


AST   


 


ALT   


 


Alkaline Phosphatase   


 


Creatine Kinase   43 


 


Troponin I   < 0.02 


 


B-Natriuretic Peptide  249.89  


 


Lipase   


 


TSH   














  03/05/18 03/05/18





  11:34 11:34


 


Monocytes %  


 


Lactic Acid   1.6


 


Magnesium  


 


Total Bilirubin  0.3  D 


 


AST  10 L D 


 


ALT  18  D 


 


Alkaline Phosphatase  73 


 


Creatine Kinase  30 L 


 


Troponin I  0.58 H D 


 


B-Natriuretic Peptide  236.80 


 


Lipase  115 


 


TSH  














ecg: sr, non-specific t wave abnormalities - similar to priors.  no acute 

ischemic changes. 


tele:   SR, rare pvc's





cxr:  scarring vs. left early lobe infiltrate.    





pharm stress 2016: No sx's.  No ischemic ekg changes. Small subtle 

inferolateral ischemia.  EF 57%.  





echo 5/2016: suboptimal views.  EF 50-55%. Impaired relaxation. Nl RV (

suboptimal views). Nl valves. Mild ao dilation 3.8 cm with laminar 

atherosclerotic plaque. 








Trop elevation.  


- elevation x 1 thus far.  con't bhavya.  patient states he took his eliquis this 

morning. EKG without acute ischemic changes.  


- Not on ASA as outpatient because on AC (remote stent), con't statin.  

depending on enzyme trend may need to convert from eliquis to short acting 

lovenox if concern for need for cath, otherwise repeat stress test when stable.

    


- will need to get records from [facility].  





sob


- unclear if cxr with congestion or infiltrates.  Will get pa/lat in am.  


- bnp stable from priors.  resume home po lasix for now.  infectious work up 

per pmd.

## 2018-03-06 LAB
ALBUMIN SERPL-MCNC: 2.7 G/DL (ref 3.4–5)
ALP SERPL-CCNC: 58 U/L (ref 45–117)
ALT SERPL-CCNC: 18 U/L (ref 12–78)
ANION GAP SERPL CALC-SCNC: 5 MMOL/L (ref 8–16)
AST SERPL-CCNC: 9 U/L (ref 15–37)
BASOPHILS # BLD: 0.7 % (ref 0–2)
BILIRUB SERPL-MCNC: 0.4 MG/DL (ref 0.2–1)
BUN SERPL-MCNC: 13 MG/DL (ref 7–18)
CALCIUM SERPL-MCNC: 7.9 MG/DL (ref 8.5–10.1)
CHLORIDE SERPL-SCNC: 106 MMOL/L (ref 98–107)
CHOLEST SERPL-MCNC: 139 MG/DL (ref 50–200)
CO2 SERPL-SCNC: 31 MMOL/L (ref 21–32)
CREAT SERPL-MCNC: 1.2 MG/DL (ref 0.7–1.3)
DEPRECATED RDW RBC AUTO: 14.6 % (ref 11.9–15.9)
EOSINOPHIL # BLD: 3.1 % (ref 0–4.5)
GLUCOSE SERPL-MCNC: 85 MG/DL (ref 74–106)
HCT VFR BLD CALC: 38.4 % (ref 35.4–49)
HDLC SERPL-MCNC: 45 MG/DL (ref 40–60)
HGB BLD-MCNC: 12.9 GM/DL (ref 11.7–16.9)
INR BLD: 1.49 (ref 0.82–1.09)
LDLC SERPL CALC-MCNC: 80 MG/DL (ref 5–100)
LYMPHOCYTES # BLD: 23.3 % (ref 8–40)
MAGNESIUM SERPL-MCNC: 2.3 MG/DL (ref 1.8–2.4)
MCH RBC QN AUTO: 32 PG (ref 25.7–33.7)
MCHC RBC AUTO-ENTMCNC: 33.5 G/DL (ref 32–35.9)
MCV RBC: 95.5 FL (ref 80–96)
MONOCYTES # BLD AUTO: 14.3 % (ref 3.8–10.2)
NEUTROPHILS # BLD: 58.6 % (ref 42.8–82.8)
PHOSPHATE SERPL-MCNC: 3.1 MG/DL (ref 2.5–4.9)
PLATELET # BLD AUTO: 180 K/MM3 (ref 134–434)
PMV BLD: 7.8 FL (ref 7.5–11.1)
POTASSIUM SERPLBLD-SCNC: 4.1 MMOL/L (ref 3.5–5.1)
PROT SERPL-MCNC: 5.5 G/DL (ref 6.4–8.2)
PT PNL PPP: 16.8 SEC (ref 9.98–11.88)
RBC # BLD AUTO: 4.02 M/MM3 (ref 4–5.6)
SODIUM SERPL-SCNC: 142 MMOL/L (ref 136–145)
TRIGL SERPL-MCNC: 101 MG/DL (ref 35–160)
WBC # BLD AUTO: 5.3 K/MM3 (ref 4–10)

## 2018-03-06 RX ADMIN — DILTIAZEM HYDROCHLORIDE SCH MG: 30 TABLET, FILM COATED ORAL at 06:00

## 2018-03-06 RX ADMIN — RANOLAZINE SCH MG: 500 TABLET, FILM COATED, EXTENDED RELEASE ORAL at 10:05

## 2018-03-06 RX ADMIN — TAMSULOSIN HYDROCHLORIDE SCH MG: 0.4 CAPSULE ORAL at 10:06

## 2018-03-06 RX ADMIN — FUROSEMIDE SCH MG: 40 TABLET ORAL at 10:07

## 2018-03-06 RX ADMIN — DILTIAZEM HYDROCHLORIDE SCH MG: 30 TABLET, FILM COATED ORAL at 14:52

## 2018-03-06 RX ADMIN — PREDNISONE SCH MG: 20 TABLET ORAL at 10:06

## 2018-03-06 RX ADMIN — IPRATROPIUM BROMIDE AND ALBUTEROL SULFATE SCH AMP: .5; 3 SOLUTION RESPIRATORY (INHALATION) at 07:40

## 2018-03-06 RX ADMIN — MORPHINE SULFATE SCH MG: 30 TABLET, EXTENDED RELEASE ORAL at 05:59

## 2018-03-06 RX ADMIN — DOCUSATE SODIUM SCH MG: 100 CAPSULE, LIQUID FILLED ORAL at 21:56

## 2018-03-06 RX ADMIN — DIGOXIN SCH MG: 250 TABLET ORAL at 10:06

## 2018-03-06 RX ADMIN — MORPHINE SULFATE SCH MG: 30 TABLET, EXTENDED RELEASE ORAL at 17:37

## 2018-03-06 RX ADMIN — IPRATROPIUM BROMIDE AND ALBUTEROL SULFATE SCH AMP: .5; 3 SOLUTION RESPIRATORY (INHALATION) at 11:35

## 2018-03-06 RX ADMIN — Medication SCH MG: at 12:00

## 2018-03-06 RX ADMIN — ROSUVASTATIN CALCIUM SCH MG: 20 TABLET, FILM COATED ORAL at 21:56

## 2018-03-06 RX ADMIN — DILTIAZEM HYDROCHLORIDE SCH MG: 30 TABLET, FILM COATED ORAL at 21:55

## 2018-03-06 RX ADMIN — APIXABAN SCH MG: 5 TABLET, FILM COATED ORAL at 22:12

## 2018-03-06 RX ADMIN — RANOLAZINE SCH MG: 500 TABLET, FILM COATED, EXTENDED RELEASE ORAL at 21:55

## 2018-03-06 RX ADMIN — APIXABAN SCH MG: 5 TABLET, FILM COATED ORAL at 10:06

## 2018-03-06 RX ADMIN — ASPIRIN 81 MG SCH MG: 81 TABLET ORAL at 10:06

## 2018-03-06 RX ADMIN — IPRATROPIUM BROMIDE AND ALBUTEROL SULFATE SCH AMP: .5; 3 SOLUTION RESPIRATORY (INHALATION) at 20:51

## 2018-03-06 RX ADMIN — IPRATROPIUM BROMIDE AND ALBUTEROL SULFATE SCH AMP: .5; 3 SOLUTION RESPIRATORY (INHALATION) at 15:50

## 2018-03-06 NOTE — EKG
Test Reason : 

Blood Pressure : ***/*** mmHG

Vent. Rate : 077 BPM     Atrial Rate : 077 BPM

   P-R Int : 164 ms          QRS Dur : 088 ms

    QT Int : 354 ms       P-R-T Axes : 037 -15 005 degrees

   QTc Int : 400 ms

 

NORMAL SINUS RHYTHM

NORMAL ECG

Nonspecific T wave abnormalities

Confirmed by MD Serge, Ishmael ([id number]) on 3/6/2018 2:31:37 PM

 

Referred By:             Confirmed By:Ishmael Valentine MD

## 2018-03-06 NOTE — CONS
PULMONARY CONSULTATION

 

DATE OF CONSULTATION:  03/06/2018

 

REFERRING PHYSICIAN:  Joseph Ragsdale NP

 

HISTORY OF PRESENT ILLNESS:  The patient is a 77-year-old white male with past

medical history of COPD on home O2; pneumonia; ASHD; status post _____ CVA with

residual left-sided weakness; factor V Leiden; history of DVT, PE, status post IVC

filter, currently maintained on anticoagulation; hypertension; hyperlipidemia; GERD;

BPH; history of prostate CA; who was admitted to [facility] on

March 5 with complaint of increasing shortness of breath, cough productive of whitish

sputum, and sore throat.  Patient also complained of some lower abdominal discomfort

and chest pain.  Patient states that he was recently hospitalized at [place] a few

days prior to admission with the same symptoms, was admitted 6 days.  Apparently had

scans done but he is unsure exactly what they were doing.  Patient was admitted

currently with the above complaint.  On admission, he was placed on bronchodilators

and continued on prednisone as well as Lasix.  Patient is a nonsmoker.  He is a

retired .  There is no history of recent travel.  There is no history of

hemoptysis.  He does complain of some mild chest discomfort.

 

PAST MEDICAL HISTORY:  Again includes pulmonary embolism, DVT, factor V Leiden, a

history of CVA with residual left-sided weakness, ASHD status post stents, pneumonia,

COPD on O2, hyperlipidemia, hypertension, GERD, BPH, prostate CA.

 

REVIEW OF SYSTEMS:  Positive mild shortness of breath.  Positive cough.  Positive

questionable chest pain.  No fever.  No chills.  No hemoptysis.  No abdominal pain. 

No lower extremity edema.

 

CURRENT MEDICATIONS:  Include Dilaudid, [place] Contin, [place] Thyroid, aspirin, Lasix,

Crestor, Lanoxin, Ranexa, Cardizem, DuoNeb, Eliquis, Flomax, and prednisone.

 

PHYSICAL EXAMINATION:

General:  The patient is a well-developed nourished male, awake, alert, comfortable,

in no acute distress.

Vital Signs:  He is currently afebrile.  Blood pressure is 130/54, respiratory rate

is 18, O2 saturation is 98% on 2 L.

HEENT:  Normocephalic, atraumatic.

Neck:  Supple.

Heart:  Regular.  S1, S2.

Chest:  Bilateral crackles throughout.

Abdomen:  Soft.  Bowel sounds are positive.

Extremities:  Normal cyanosis or edema.

 

LABORATORY DATA:  WBC is 5.3, hemoglobin 12.9, hematocrit 38.4, with a platelet count

of 180,000.  INR is 1.49.  Venous blood gas shows pH 7.37, pCO2 of 53, a pO2 of 17. 

BUN is 13, creatinine 1.2.  Troponin 0.47.

 

Chest x-ray:  Cardiomegaly, questionable left lower lobe infiltrate versus scarring,

some increased markings bilaterally.

 

IMPRESSION:

1.  Dyspnea, cough, likely chronic obstructive pulmonary disease exacerbation.

2.  Mild congestive heart failure.

3.  Likely possible interstitial lung disease.

4.  Positive troponins.

5.  Hypertension.

6.  History of deep venous thrombosis, pulmonary embolism.

7.  Factor V Leiden.

8.  Hyperlipidemia.

9.  Prostate cancer.

 

PLAN:  Continue bronchodilators, supplemental O2, continue prednisone.  Trend

troponins.  Continue Lasix.  Follow up chest x-ray.  Try to obtain old CT from

[facility].  Cardiology followup.

 

 

ELFEGO [id number]

DD: 03/06/2018 11:48

DT: 03/06/2018 13:08

Job #:  [id number]

## 2018-03-06 NOTE — PN
Progress Note (short form)





- Note


Progress Note: 








CC:  sob, abnormal troponin





S: received records from [facility] - reviewed


1/2 blood cx pending organism, cx repeated today.  


mild sob persists.  no cp, palps, dizziness.  Still with tenderness to 

palpation of left flank








 Current Medications





Albuterol/Ipratropium (Duoneb -)  1 amp NEB RQID [facility]


   Last Admin: 03/06/18 11:35 Dose:  1 amp


Apixaban (Eliquis -)  5 mg PO BID [facility]


   Last Admin: 03/06/18 10:06 Dose:  5 mg


Aspirin (Asa -)  81 mg PO DAILY [facility]


   Last Admin: 03/06/18 10:06 Dose:  81 mg


Digoxin (Lanoxin -)  0.25 mg PO DAILY [facility]


   Last Admin: 03/06/18 10:06 Dose:  0.25 mg


Diltiazem HCl (Cardizem -)  30 mg PO TID [facility]


   Last Admin: 03/06/18 06:00 Dose:  30 mg


Furosemide (Lasix -)  40 mg PO DAILY [facility]


   Last Admin: 03/06/18 10:07 Dose:  40 mg


Hydromorphone HCl (Dilaudid -)  8 mg PO Q8H PRN


   PRN Reason: PAIN


   Last Admin: 03/05/18 20:42 Dose:  8 mg


Morphine Sulfate (Ms Contin -)  30 mg PO BID@0600,1800 [facility]


   Last Admin: 03/06/18 05:59 Dose:  30 mg


Prednisone (Deltasone -)  40 mg PO DAILY [facility]


   Last Admin: 03/06/18 10:06 Dose:  40 mg


Ranolazine (Ranexa -)  500 mg PO BID [facility]


   Last Admin: 03/06/18 10:05 Dose:  500 mg


Rosuvastatin Calcium (Crestor -)  20 mg PO [facility]


Tamsulosin HCl (Flomax -)  0.4 mg PO DAILY [facility]


   Last Admin: 03/06/18 10:06 Dose:  0.4 mg


Thyroid ([place] Thyroid -)  60 mg PO DAILY@0700 [facility]


   Last Admin: 03/06/18 12:00 Dose:  60 mg











 Vital Signs - 24 hr











  03/05/18 03/05/18 03/05/18





  17:30 18:23 21:00


 


Temperature   


 


Pulse Rate 73  


 


Pulse Rate [  78 





Apical]   


 


Respiratory 18 18 





Rate   


 


Blood Pressure 124/64  


 


Blood Pressure  110/57 





[Left Arm]   


 


O2 Sat by Pulse 97 99 99





Oximetry (%)   














  03/05/18 03/06/18 03/06/18





  21:31 01:24 05:00


 


Temperature 97.8 F 97.6 F 97.2 F L


 


Pulse Rate 68 64 65


 


Pulse Rate [   





Apical]   


 


Respiratory 18 18 8 L





Rate   


 


Blood Pressure 124/67 100/57 100/50


 


Blood Pressure   





[Left Arm]   


 


O2 Sat by Pulse   





Oximetry (%)   














  03/06/18 03/06/18





  09:00 10:06


 


Temperature 98 F 


 


Pulse Rate 76 78


 


Pulse Rate [  





Apical]  


 


Respiratory 18 





Rate  


 


Blood Pressure 130/54 


 


Blood Pressure  





[Left Arm]  


 


O2 Sat by Pulse 98 





Oximetry (%)  














 Intake & Output











 03/04/18 03/05/18 03/06/18 03/07/18





 07:59 07:59 07:59 07:59


 


Intake Total   260 240


 


Balance   260 240


 


Weight   210 lb 








nad, calm


jvd tds but does not appear elevated. 


rrr s1s2 no mrg


bibasilar dry rales. nl effort


aaox3


no le e/c/c


abd nt nd pos bs


pos dp pt, no carotid bruits


no jaundice diaphoresis











 CBC, BMP





 03/06/18 06:30 





 03/06/18 06:30 





Microbiology





03/05/18 11:34   Blood - Peripheral Venous   Blood Culture - Preliminary


                              Pending Organism


03/05/18 11:34   Blood - Peripheral Venous   Blood Culture - Preliminary


                              NO GROWTH OBTAINED AFTER 24 HOURS, INCUBATION TO 

CONTINUE


                              FOR 4 DAYS.





 Laboratory Tests











  03/05/18 03/06/18 03/06/18





  19:45 00:00 06:30


 


Magnesium    2.3


 


Creatine Kinase  33 L  


 


Troponin I  0.37 H D  0.47 H 


 


Albumin    2.7 L


 


Total LDL Cholesterol    80  D


 


TSH    4.70 H D


 


Digoxin    1.2400














ecg: sr, non-specific t wave abnormalities - similar to priors.  no acute 

ischemic changes. 


2/27/18 ekg [facility]:  SVT, 181 bpm. subtle lateral ST abnormalities/STD  


tele:   SR, rare pvc's, 8 beat AIVR





cxr:  scarring vs. left early lobe infiltrate. 


3/6 cxr: slightly better aeration at left base.  apical pleural thickening.     





PFTs [facility] 2/2018: moderate restrictive defect with mild decrease in diffusion 

capacity.  


CTA chest [facility] 2/2018: suboptimal opacification of pulmonary arterial tree 

limiting evaluation.  curvilinear filling defect in segmental branch of RLL 

similar to 2015 cta - may reflect chronic PE.  No acute PE.  + aortic 

atherosclerosis.  prominent calcification of cors.  low lung volumes.  

peripheral/basilar reticular opacities, honeycombing and traction 

bronchiectasis within bilateral lower lobes.  scattered atelectasis/scarring.  

RML nodule.  mediastinal LAD, somewhat similar to 2015 CT.  


LE dopplers 2/2018:  B LE DVT's


Echo 2/2018: tds.  mild LVE.  nl lv fn.  EF 60-65%.  nl rv size/fn.  small 

pericardial effusion.  





Echo here 3/2018: tds. no pericardial effusion.  





echo 5/2016: suboptimal views.  EF 50-55%. Impaired relaxation. Nl RV (

suboptimal views). Nl valves. Mild ao dilation 3.8 cm with laminar 

atherosclerotic plaque. 








pharm stress 2016: No sx's.  No ischemic ekg changes. Small subtle 

inferolateral ischemia.  EF 57%.  








A/P





78 yo m hx of CAD with multiple stents (last cath was 1/2012 at [facility]: dontae to om1

, residual dz: 40%pLCX, 50%pLAD, 60%mLAD, 80%D1, 40%pRCA), htn, hld, chronic 

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

PE/DVT S/P IVC Filter(2003) on eliquis, gerd, gastroparesis, BPH, Prostate Ca, 

hypothyroid, Chronic Pain Syndrome, Anxiety, OA here with sob  





CAD with prior pci (last 2012)/Trop elevation/NSTEMI  


- persistent elevation with flat trend.  EKG without acute ischemic changes.  


- reviewed [facility] records.  initial trop not elevated.  had episode of SVT to 180'

s on 2/27, subsequent trops not drawn.  Current elevation in troponin has 

overall flat trend with nl CK.  Likely had troponin lead at that time and now 

with residual elevation.  ddx.  myopericarditis (crp elevated).  echo here tds.

  


- low suspicion for acute ACS here as mentioned, but added ASA for underlying 

CAD/demand ischemia (also on eliquis).  Will likely need ischemic evaluation 

for risk stratification once acute issues resolve.   


- con't statin.  





sob


- unclear if initial cxr with congestion or infiltrates. -->  pa/lat today 

without evidence of chf.  CTA/PFT's at [facility] c/w ILD.  Flu neg at [facility] 


- bnp stable from priors.  no signs of acute diastolic hf exacerbation.  

resumed home po lasix. 


- ischemic evaluation as above.  


- infectious work up per pmd. 





SVT


- Episode of SVT up to 180's on [facility] admit 2/27/2018.  Cr at the time had peak 

of 1.75.  CRP at the time 27.4.  Troponins prior to episode and at time of 

episode were negative, but no trops drawn after episode.  


- per patient was started on diltiazem and digoxin (pt has new meds with him 

here). prior toprol was not continued.  





Bilateral DVT


- recent CTA at [facility] with no evidence of acute PE.  has h/o factor V leiden.  con

't eliquis.

## 2018-03-06 NOTE — PN
Physical Exam: 


SUBJECTIVE: Patient seen and examined at the bedside.





OBJECTIVE:





 Vital Signs











 Period  Temp  Pulse  Resp  BP Sys/Abel  Pulse Ox


 


 Last 24 Hr  97.2 F-98 F  64-80  8-18  100-130/50-74  97-99








GENERAL: Awake, alert, and fully oriented, in no mild respiratory distress.


HEAD: Normal with no signs of trauma.


EYES: Pupils equal, round and reactive to light, extraocular movements intact, 

sclera anicteric, conjunctiva clear. No lid lag.


EARS, NOSE, THROAT: Ears normal, nares patent, oropharynx clear without 

exudates. Moist mucous membranes.


NECK: Normal range of motion, supple without lymphadenopathy, JVD, or masses.


LUNGS: left lower lobe + crackles at the bases, right lung clear/diminished, no 

wheezing


HEART: Regular rate and rhythm, normal S1 and S2 without murmur, rub or gallop.


ABDOMEN: Soft, nontender, not distended, normoactive bowel sounds, no guarding, 

no rebound, no masses.  


MUSCULOSKELETAL: Normal range of motion at all joints. No bony deformities or 

tenderness. No CVA tenderness.


UPPER EXTREMITIES: No peripheral edema.


LOWER EXTREMITIES: trace edema bilaterally


NEUROLOGICAL:  Normal speech. Normal steady gait.


PSYCHIATRIC: Cooperative. Good eye contact. Appropriate mood and affect.





 Laboratory Results - last 24 hr











  03/05/18 03/05/18 03/05/18





  11:34 12:39 13:05


 


WBC   


 


RBC   


 


Hgb   


 


Hct   


 


MCV   


 


MCH   


 


MCHC   


 


RDW   


 


Plt Count   


 


MPV   


 


Neutrophils %   


 


Lymphocytes %   


 


Monocytes %   


 


Eosinophils %   


 


Basophils %   


 


PT with INR   


 


INR   


 


Sodium  141  


 


Potassium  4.4  


 


Chloride  102  


 


Carbon Dioxide  27  


 


Anion Gap  12  


 


BUN  17  


 


Creatinine  1.3  


 


Creat Clearance w eGFR  53.53  


 


Random Glucose  109 H  


 


Hemoglobin A1c %   


 


Calcium  8.7  


 


Phosphorus   


 


Magnesium   


 


Total Bilirubin  0.3  D  


 


AST  10 L D  


 


ALT  18  D  


 


Alkaline Phosphatase  73  


 


Creatine Kinase  30 L  


 


Troponin I  0.58 H D  


 


B-Natriuretic Peptide  236.80  Cancelled 


 


Total Protein  6.5  


 


Albumin  3.0 L  


 


Triglycerides   


 


Cholesterol   


 


Total LDL Cholesterol   


 


HDL Cholesterol   


 


Lipase  115  


 


TSH   


 


Urine Color    Yellow


 


Urine Appearance    Slcloudy


 


Urine pH    7.0


 


Ur Specific Gravity    1.013


 


Urine Protein    Negative


 


Urine Glucose (UA)    Negative


 


Urine Ketones    Negative


 


Urine Blood    Negative


 


Urine Nitrite    Negative


 


Urine Bilirubin    Negative


 


Urine Urobilinogen    2.0


 


Ur Leukocyte Esterase    Negative


 


Digoxin   














  03/05/18 03/06/18 03/06/18





  19:45 00:00 06:30


 


WBC   


 


RBC   


 


Hgb   


 


Hct   


 


MCV   


 


MCH   


 


MCHC   


 


RDW   


 


Plt Count   


 


MPV   


 


Neutrophils %   


 


Lymphocytes %   


 


Monocytes %   


 


Eosinophils %   


 


Basophils %   


 


PT with INR   


 


INR   


 


Sodium    142


 


Potassium    4.1


 


Chloride    106


 


Carbon Dioxide    31


 


Anion Gap    5 L


 


BUN    13  D


 


Creatinine    1.2


 


Creat Clearance w eGFR    58.71


 


Random Glucose    85  D


 


Hemoglobin A1c %   


 


Calcium    7.9 L


 


Phosphorus    3.1  D


 


Magnesium    2.3


 


Total Bilirubin    0.4  D


 


AST    9 L


 


ALT    18


 


Alkaline Phosphatase    58  D


 


Creatine Kinase  33 L  


 


Troponin I  0.37 H D  0.47 H 


 


B-Natriuretic Peptide   


 


Total Protein    5.5 L


 


Albumin    2.7 L


 


Triglycerides    101  D


 


Cholesterol    139


 


Total LDL Cholesterol    80  D


 


HDL Cholesterol    45


 


Lipase   


 


TSH   


 


Urine Color   


 


Urine Appearance   


 


Urine pH   


 


Ur Specific Gravity   


 


Urine Protein   


 


Urine Glucose (UA)   


 


Urine Ketones   


 


Urine Blood   


 


Urine Nitrite   


 


Urine Bilirubin   


 


Urine Urobilinogen   


 


Ur Leukocyte Esterase   


 


Digoxin    1.2400














  03/06/18 03/06/18 03/06/18





  06:30 06:30 06:30


 


WBC  5.3  


 


RBC  4.02  


 


Hgb  12.9  


 


Hct  38.4  


 


MCV  95.5  


 


MCH  32.0  


 


MCHC  33.5  


 


RDW  14.6  


 


Plt Count  180  


 


MPV  7.8  


 


Neutrophils %  58.6  


 


Lymphocytes %  23.3  D  


 


Monocytes %  14.3 H  


 


Eosinophils %  3.1  D  


 


Basophils %  0.7  


 


PT with INR   16.80 H 


 


INR   1.49 H 


 


Sodium   


 


Potassium   


 


Chloride   


 


Carbon Dioxide   


 


Anion Gap   


 


BUN   


 


Creatinine   


 


Creat Clearance w eGFR   


 


Random Glucose   


 


Hemoglobin A1c %   


 


Calcium   


 


Phosphorus   


 


Magnesium   


 


Total Bilirubin   


 


AST   


 


ALT   


 


Alkaline Phosphatase   


 


Creatine Kinase   


 


Troponin I   


 


B-Natriuretic Peptide   


 


Total Protein   


 


Albumin   


 


Triglycerides    Cancelled


 


Cholesterol    Cancelled


 


Total LDL Cholesterol    Cancelled


 


HDL Cholesterol    Cancelled


 


Lipase   


 


TSH   


 


Urine Color   


 


Urine Appearance   


 


Urine pH   


 


Ur Specific Gravity   


 


Urine Protein   


 


Urine Glucose (UA)   


 


Urine Ketones   


 


Urine Blood   


 


Urine Nitrite   


 


Urine Bilirubin   


 


Urine Urobilinogen   


 


Ur Leukocyte Esterase   


 


Digoxin   














  03/06/18 03/06/18





  06:30 06:30


 


WBC  


 


RBC  


 


Hgb  


 


Hct  


 


MCV  


 


MCH  


 


MCHC  


 


RDW  


 


Plt Count  


 


MPV  


 


Neutrophils %  


 


Lymphocytes %  


 


Monocytes %  


 


Eosinophils %  


 


Basophils %  


 


PT with INR  


 


INR  


 


Sodium  


 


Potassium  


 


Chloride  


 


Carbon Dioxide  


 


Anion Gap  


 


BUN  


 


Creatinine  


 


Creat Clearance w eGFR  


 


Random Glucose  


 


Hemoglobin A1c %  6.2 H D 


 


Calcium  


 


Phosphorus  


 


Magnesium  


 


Total Bilirubin  


 


AST  


 


ALT  


 


Alkaline Phosphatase  


 


Creatine Kinase  


 


Troponin I  


 


B-Natriuretic Peptide  


 


Total Protein  


 


Albumin  


 


Triglycerides  


 


Cholesterol  


 


Total LDL Cholesterol  


 


HDL Cholesterol  


 


Lipase  


 


TSH   Cancelled


 


Urine Color  


 


Urine Appearance  


 


Urine pH  


 


Ur Specific Gravity  


 


Urine Protein  


 


Urine Glucose (UA)  


 


Urine Ketones  


 


Urine Blood  


 


Urine Nitrite  


 


Urine Bilirubin  


 


Urine Urobilinogen  


 


Ur Leukocyte Esterase  


 


Digoxin  








Active Medications











Generic Name Dose Route Start Last Admin





  Trade Name Freq  PRN Reason Stop Dose Admin


 


Albuterol/Ipratropium  1 amp  03/05/18 20:00  03/06/18 11:35





  Duoneb -  NEB   1 amp





  RQID MARCELO   Administration


 


Apixaban  5 mg  03/05/18 22:00  03/06/18 10:06





  Eliquis -  PO   5 mg





  BID MARCELO   Administration


 


Aspirin  81 mg  03/05/18 19:00  03/06/18 10:06





  Asa -  PO   81 mg





  DAILY MARCELO   Administration


 


Digoxin  0.25 mg  03/06/18 10:00  03/06/18 10:06





  Lanoxin -  PO   0.25 mg





  DAILY MARCELO   Administration


 


Diltiazem HCl  30 mg  03/05/18 22:00  03/06/18 06:00





  Cardizem -  PO   30 mg





  TID MARCELO   Administration


 


Furosemide  40 mg  03/06/18 10:00  03/06/18 10:07





  Lasix -  PO   40 mg





  DAILY MARCELO   Administration


 


Hydromorphone HCl  8 mg  03/05/18 15:58  03/05/18 20:42





  Dilaudid -  PO   8 mg





  Q8H PRN   Administration





  PAIN   


 


Morphine Sulfate  30 mg  03/05/18 19:00  03/06/18 05:59





  Ms Contin -  PO   30 mg





  BID@0600,1800 MARCELO   Administration


 


Prednisone  40 mg  03/06/18 10:00  03/06/18 10:06





  Deltasone -  PO   40 mg





  DAILY MARCELO   Administration


 


Ranolazine  500 mg  03/05/18 22:00  03/06/18 10:05





  Ranexa -  PO   500 mg





  BID MARCELO   Administration


 


Rosuvastatin Calcium  20 mg  03/06/18 22:00  





  Crestor -  PO   





  [facility]   


 


Tamsulosin HCl  0.4 mg  03/06/18 10:00  03/06/18 10:06





  Flomax -  PO   0.4 mg





  DAILY MARCELO   Administration


 


Thyroid  60 mg  03/06/18 10:11  03/06/18 12:00





  [place] Thyroid -  PO   60 mg





  DAILY@0700 MARCELO   Administration











ASSESSMENT/PLAN:





Patient is a 77 year old male with a significant past medical history of CVA, 

CAD s/p stents x 2, factor V leiden, diverticulitis, DVT (on Elliquis 5mg BID), 

GERD, kidney stones, MI, hypertension and COPD (home oxygen dependent)   He 

presents to the ED today with complaints of shortness of breath x 1 week with 

cough of white sputum with a sore throat.  Patient also endorses some lower 

abdominal pain and chest pain. Patient states he was seen at [facility] 

a few days ago for the same complaints and was admitted for 6 days and was seen 

by both a cardiologist and pulmonogist.  Pt denies any fever/chills, diarrhea, 

nausea or vomiting.   Patient states he came here today because his sob feels a 

bit worse than yesterday.





Patient reports non compliance with home medications. 





CTA and doppler Imaging at [facility] as follows:


CTA chest 2/23/2018: curvillnear filling defect within a segmental branch of 

the right lower lobe similar to CT on 1/29/2015 and may reflect sequela of 

chronic pulmonary embolisms.  No CT evidence of other acute pulmonary embolism 

with the remaining main, lobar and segmental branches.


Additional findings are suspicious for progressive interstitial lung disease 

such as interstitial pneumonia. 


7mm pulmonary nodule within the right middle lobe, follow up per Fleishner 

criteria guidelines.  Extensive 3 vessel coronary artery atherosclerotic 

disease.


Bilateral lower ext venous doppler ultrasound:  Findings consistent with DVT 

within the left common femoral vein and deep femoral vein.  





Imaging (R):





Chest xray 3/6:  left lower lobe infiltrate


Echo 3/5/18: e/a reversal consistent with poor LV fx, trace MR, trace to mild 

tricuspid regurg., mild aortic root dilatation, trivial pericardial effusion.





ID:


Sepsis, rule out


Left lower infiltrate seen on chest xray


Hypotension, hypoxia on admission


Feels flushed, subjective fevers @ home


Monitor vitals, no fevers noted, WBC normal


Maintain oxygen sats 90% or above


Blood cultures, one bottle shows pending organism, likely contaminant


Repeat blood cultures pending


ID consult and following





Pulmonary


Shortness of breath, acute on chronic


COPD exacerbation


Rule out interstitial lung disease


CTA with possible chronic PEs


On Eliquis, but non compliance with home meds reported by patient


Consider hematology consult outpatient


On home oxygen 


Trops flat trending


On supplemental oxygen @ 2-3 liters


Maintain oxygen saturations 90% or greater


Pulmonary following





Cardiology:


CAD s/p stents x 2


DVT history


On Eliquis 5mg BID





Hypertension, chronic


On Coreq BID 


Also on Diliatizem





Muscular/Skeletal


Back pain, generalized pain, chronic


will continue his home regimen as pain is musculoskeletal


MS contin BID, Dilaudid 8mg TID prn





Hematology:


Factor 5 leiden


PE, DVT history


IVC filter


On Eliquis 5mg BID


Eliquis non compliance





F.E.N.


Fluids: Po adequate


Electrolytes: monitor


Nutrition: low sodium diet





Prophy:


On Eliquis





Disposition:  full code. 








Visit type





- Emergency Visit


Emergency Visit: Yes


ED Registration Date: 03/05/18


Care time: The patient presented to the Emergency Department on the above date 

and was hospitalized for further evaluation of their emergent condition.





- New Patient


This patient is new to me today: No





- Critical Care


Critical Care patient: No





- Discharge Referral


Referred to [facility] No

## 2018-03-06 NOTE — CON.ID
Consult


Consult Specialty:: infectious diseases


Reason for Consultation:: weakness pain in the flank





- History of Present Illness


Chief Complaint: not feeling well


History of Present Illness: 





77 year old male with a significant past medical history of CVA, CAD s/p stents 

x 2, factor V leiden, diverticulitis, DVT (on Elliquis 5mg BID), GERD, kidney 

stones, MI, hypertension and COPD (home oxygen dependent)   He presents to the 

ED today with complaints of shortness of breath x 1 week with cough of white 

sputum with a sore throat.  Patient also endorses some lower abdominal pain and 

chest pain. Patient states he was seen at [facility] a few days ago for 

the same complaints and was admitted for 6 days and was seen by both a 

cardiologist and pulmonogist.  Pt denies any fever/chills, diarrhea, nausea or 

vomiting.   Patient states he came here today because his sob feels a bit worse 

than yesterday.


on work up in the [facility] patient was found to have interstitial 

disease


patient used to work as  it seems


currently patient feels weak and thinks he is having pneumonia and is c.o of rt 

flank pain


no fevers only that his strength is minimal


he does c/o of sob





- History Source


History Provided By: Patient


Limitations to Obtaining History: No Limitations





- Past Medical History


CNS: Yes: CVA


Cardio/Vascular: Yes: CAD (multiple stents), Deep Vein Thrombosis, HTN, 

Hyperlipdemia, MI


Pulmonary: Yes: COPD


Gastrointestinal: Yes: GERD


Renal/: Yes: BPH, Cancer (Prostate)


Psych: Yes: Anxiety


Musculoskeletal: Yes: Osteoarthritis, Other (Chronic pain syndrome)





- Past Surgical History


Past Surgical History: Yes: Stent





- Alcohol/Substance Use


Hx Alcohol Use: Yes


History of Substance Use: reports: None





- Smoking History


Smoking history: Never smoked


Have you smoked in the past 12 months: No


Aproximately how many cigarettes per day: 0





- Social History


ADL: Independent


Occupation: Retired 


History of Recent Travel: No





Home Medications





- Allergies


Allergies/Adverse Reactions: 


 Allergies











Allergy/AdvReac Type Severity Reaction Status Date / Time


 


No Known Allergies Allergy   Verified 03/05/18 10:12














- Home Medications


Home Medications: 


Ambulatory Orders





Hydromorphone HCl [Dilaudid] 8 mg PO TID PRN 08/26/16 


Linaclotide [Linzess] 290 mcg PO DAILY 08/26/16 


Thyroid [[place] Thyroid] 90 mg PO DAILY 08/26/16 


Apixaban [Eliquis] 5 mg PO BID 04/13/17 


Omeprazole 40 mg PO DAILY 04/13/17 


Tamsulosin HCl 0.4 mg PO DAILY 04/13/17 


Ranolazine [Ranexa] 500 mg PO BID 04/14/17 


Morphine *Sr* [MS Contin -] 30 mg PO Q12H PRN 12/04/17 


predniSONE [Deltasone -] 40 mg PO DAILY 3 Days  tablet 12/06/17 


Albuterol 2.5/Ipratropium 0.5 [Duoneb -] 1 amp NEB PRN PRN 03/05/18 


Digoxin [Lanoxin -] 0.25 mg PO DAILY 03/05/18 


Diltiazem [Cardizem -] 60 mg PO TID 03/05/18 


Fluticasone Prop 0.05% Nasal [Flonase -] 1 spray NS BID 03/05/18 











Review of Systems





- Review of Systems


Constitutional: reports: No Symptoms, Weakness.  denies: Chills, Fever


Eyes: reports: No Symptoms


HENT: reports: No Symptoms


Neck: reports: No Symptoms


Cardiovascular: reports: No Symptoms


Respiratory: reports: SOB, SOB on Exertion


Gastrointestinal: reports: No Symptoms


Genitourinary: reports: Flank Pain (rt)


Musculoskeletal: reports: No Symptoms


Neurological: reports: No Symptoms


Endocrine: reports: No Symptoms


Hematology/Lymphatic: reports: No Symptoms


Psychiatric: reports: No Symptoms





Physical Exam


Vital Signs: 


 Vital Signs











Temperature  98 F   03/06/18 09:00


 


Pulse Rate  78   03/06/18 10:06


 


Respiratory Rate  18   03/06/18 09:00


 


Blood Pressure  130/54   03/06/18 09:00


 


O2 Sat by Pulse Oximetry (%)  98   03/06/18 09:00











Constitutional: Yes: Well Nourished, Calm, Mild Distress


Eyes: Yes: Conjunctiva Clear


HENT: Yes: Atraumatic, Normocephalic


Neck: Yes: Supple, Trachea Midline


Cardiovascular: Yes: Regular Rate and Rhythm


Respiratory: Yes: Regular, On Nasal O2, Poor Air Entry, Other (crackles)


Gastrointestinal: Yes: Normal Bowel Sounds, Soft


Renal/: Yes: CVA Tenderness - Right


Musculoskeletal: Yes: WNL


Extremities: Yes: WNL


Neurological: Yes: Alert, Oriented


Psychiatric: Yes: Alert, Oriented


Labs: 


 CBC, BMP





 03/06/18 06:30 





 03/06/18 06:30 











Imaging





- Results


Chest X-ray: Report Reviewed, Image Reviewed





Assessment/Plan





after evaluating the patient i have low suspicion of infection.there is 

something in the left lower lobe





dyspnea


ll infiltrate


prostate ca


htn


hld








plan


will hold off starting abx


will monitor


await for blood cx


rest continue as per primary team


pul


consider ct scan of the abd and pelvis to see if any other pathology present

## 2018-03-06 NOTE — PN
Progress Note (short form)





- Note


Progress Note: 





PULMONARY





CONSULTATION DICTATED 3/6/18





IMP DYSPNEA


      COPD EXACERBATION


      CHF


      ? LLL INFILTRATE


      FACTOR V LEIDEN


      H/O DVT/PE


      ? ILD


      H/O CVA


      + TROPONINS


      PROSTATE CA


     HLD


     HTN





PLAN O2


        PREDNISONE


       ABX


       LASIX


       TREND TROPONINS


       F/U CHEST X-RAYS


 


DR ARREOLA

## 2018-03-07 LAB
ALBUMIN SERPL-MCNC: 3 G/DL (ref 3.4–5)
ALP SERPL-CCNC: 61 U/L (ref 45–117)
ALT SERPL-CCNC: 17 U/L (ref 12–78)
ANION GAP SERPL CALC-SCNC: 10 MMOL/L (ref 8–16)
AST SERPL-CCNC: 7 U/L (ref 15–37)
BASOPHILS # BLD: 0.4 % (ref 0–2)
BILIRUB SERPL-MCNC: 0.6 MG/DL (ref 0.2–1)
BUN SERPL-MCNC: 14 MG/DL (ref 7–18)
CALCIUM SERPL-MCNC: 8.3 MG/DL (ref 8.5–10.1)
CHLORIDE SERPL-SCNC: 101 MMOL/L (ref 98–107)
CO2 SERPL-SCNC: 27 MMOL/L (ref 21–32)
CREAT SERPL-MCNC: 1.3 MG/DL (ref 0.7–1.3)
DEPRECATED RDW RBC AUTO: 14.2 % (ref 11.9–15.9)
EOSINOPHIL # BLD: 1 % (ref 0–4.5)
GLUCOSE SERPL-MCNC: 114 MG/DL (ref 74–106)
HCT VFR BLD CALC: 38.6 % (ref 35.4–49)
HGB BLD-MCNC: 13 GM/DL (ref 11.7–16.9)
LYMPHOCYTES # BLD: 19.8 % (ref 8–40)
MAGNESIUM SERPL-MCNC: 2.4 MG/DL (ref 1.8–2.4)
MCH RBC QN AUTO: 31.7 PG (ref 25.7–33.7)
MCHC RBC AUTO-ENTMCNC: 33.7 G/DL (ref 32–35.9)
MCV RBC: 94 FL (ref 80–96)
MONOCYTES # BLD AUTO: 12 % (ref 3.8–10.2)
NEUTROPHILS # BLD: 66.8 % (ref 42.8–82.8)
PLATELET # BLD AUTO: 201 K/MM3 (ref 134–434)
PMV BLD: 7.5 FL (ref 7.5–11.1)
POTASSIUM SERPLBLD-SCNC: 3.6 MMOL/L (ref 3.5–5.1)
PROT SERPL-MCNC: 5.9 G/DL (ref 6.4–8.2)
RBC # BLD AUTO: 4.11 M/MM3 (ref 4–5.6)
SODIUM SERPL-SCNC: 138 MMOL/L (ref 136–145)
WBC # BLD AUTO: 8.6 K/MM3 (ref 4–10)

## 2018-03-07 RX ADMIN — RANOLAZINE SCH MG: 500 TABLET, FILM COATED, EXTENDED RELEASE ORAL at 21:47

## 2018-03-07 RX ADMIN — PREDNISONE SCH MG: 20 TABLET ORAL at 10:12

## 2018-03-07 RX ADMIN — TAMSULOSIN HYDROCHLORIDE SCH MG: 0.4 CAPSULE ORAL at 10:12

## 2018-03-07 RX ADMIN — DILTIAZEM HYDROCHLORIDE SCH MG: 30 TABLET, FILM COATED ORAL at 13:36

## 2018-03-07 RX ADMIN — AZITHROMYCIN DIHYDRATE SCH MLS/HR: 500 INJECTION, POWDER, LYOPHILIZED, FOR SOLUTION INTRAVENOUS at 13:36

## 2018-03-07 RX ADMIN — DOCUSATE SODIUM SCH MG: 100 CAPSULE, LIQUID FILLED ORAL at 06:02

## 2018-03-07 RX ADMIN — FLUTICASONE PROPIONATE SCH INH: 50 SPRAY, METERED NASAL at 21:50

## 2018-03-07 RX ADMIN — IPRATROPIUM BROMIDE AND ALBUTEROL SULFATE SCH AMP: .5; 3 SOLUTION RESPIRATORY (INHALATION) at 08:15

## 2018-03-07 RX ADMIN — DILTIAZEM HYDROCHLORIDE SCH MG: 30 TABLET, FILM COATED ORAL at 21:46

## 2018-03-07 RX ADMIN — DOCUSATE SODIUM SCH MG: 100 CAPSULE, LIQUID FILLED ORAL at 21:47

## 2018-03-07 RX ADMIN — IPRATROPIUM BROMIDE AND ALBUTEROL SULFATE SCH AMP: .5; 3 SOLUTION RESPIRATORY (INHALATION) at 21:35

## 2018-03-07 RX ADMIN — IPRATROPIUM BROMIDE AND ALBUTEROL SULFATE SCH AMP: .5; 3 SOLUTION RESPIRATORY (INHALATION) at 11:41

## 2018-03-07 RX ADMIN — SENNOSIDES PRN TAB: 8.6 TABLET, FILM COATED ORAL at 21:48

## 2018-03-07 RX ADMIN — FUROSEMIDE SCH MG: 40 TABLET ORAL at 10:12

## 2018-03-07 RX ADMIN — FLUTICASONE PROPIONATE SCH INH: 50 SPRAY, METERED NASAL at 13:35

## 2018-03-07 RX ADMIN — MORPHINE SULFATE SCH MG: 30 TABLET, EXTENDED RELEASE ORAL at 06:02

## 2018-03-07 RX ADMIN — MORPHINE SULFATE SCH: 30 TABLET, EXTENDED RELEASE ORAL at 17:25

## 2018-03-07 RX ADMIN — DIGOXIN SCH MG: 250 TABLET ORAL at 10:11

## 2018-03-07 RX ADMIN — METHYLPREDNISOLONE SODIUM SUCCINATE SCH MG: 125 INJECTION, POWDER, FOR SOLUTION INTRAMUSCULAR; INTRAVENOUS at 11:36

## 2018-03-07 RX ADMIN — APIXABAN SCH MG: 5 TABLET, FILM COATED ORAL at 10:12

## 2018-03-07 RX ADMIN — DILTIAZEM HYDROCHLORIDE SCH MG: 30 TABLET, FILM COATED ORAL at 06:02

## 2018-03-07 RX ADMIN — ROSUVASTATIN CALCIUM SCH MG: 20 TABLET, FILM COATED ORAL at 21:47

## 2018-03-07 RX ADMIN — DOCUSATE SODIUM SCH MG: 100 CAPSULE, LIQUID FILLED ORAL at 13:36

## 2018-03-07 RX ADMIN — IPRATROPIUM BROMIDE AND ALBUTEROL SULFATE SCH AMP: .5; 3 SOLUTION RESPIRATORY (INHALATION) at 16:44

## 2018-03-07 RX ADMIN — MAGNESIUM CITRATE ONE: 1.75 LIQUID ORAL at 10:44

## 2018-03-07 RX ADMIN — METHYLPREDNISOLONE SODIUM SUCCINATE SCH MG: 125 INJECTION, POWDER, FOR SOLUTION INTRAMUSCULAR; INTRAVENOUS at 17:25

## 2018-03-07 RX ADMIN — ASPIRIN 81 MG SCH MG: 81 TABLET ORAL at 10:12

## 2018-03-07 RX ADMIN — RANOLAZINE SCH MG: 500 TABLET, FILM COATED, EXTENDED RELEASE ORAL at 10:11

## 2018-03-07 RX ADMIN — APIXABAN SCH MG: 5 TABLET, FILM COATED ORAL at 21:47

## 2018-03-07 RX ADMIN — Medication SCH MG: at 06:09

## 2018-03-07 RX ADMIN — MAGNESIUM CITRATE ONE ML: 1.75 LIQUID ORAL at 10:39

## 2018-03-07 NOTE — PN
Progress Note (short form)





- Note


Progress Note: 





S: +cough and nasal congestion, no cp palps dizzy; mild sob








 


 Current Medications











Generic Name Dose Route Start Last Admin





  Trade Name Freq  PRN Reason Stop Dose Admin


 


Albuterol/Ipratropium  1 amp  03/05/18 20:00  03/07/18 08:15





  Duoneb -  NEB   1 amp





  RQID MARCELO   Administration


 


Apixaban  5 mg  03/05/18 22:00  03/07/18 10:12





  Eliquis -  PO   5 mg





  BID MARCELO   Administration


 


Aspirin  81 mg  03/05/18 19:00  03/07/18 10:12





  Asa -  PO   81 mg





  DAILY MARCELO   Administration


 


Digoxin  0.25 mg  03/06/18 10:00  03/07/18 10:11





  Lanoxin -  PO   0.25 mg





  DAILY MARCELO   Administration


 


Diltiazem HCl  30 mg  03/05/18 22:00  03/07/18 06:02





  Cardizem -  PO   30 mg





  TID MARCELO   Administration


 


Docusate Sodium  100 mg  03/06/18 22:00  03/07/18 06:02





  Colace -  PO   100 mg





  TID MARCELO   Administration


 


Fluticasone Propionate  1 spray  03/07/18 11:00  





  Flonase -  NS   





  BID MARCELO   


 


Furosemide  40 mg  03/06/18 10:00  03/07/18 10:12





  Lasix -  PO   40 mg





  DAILY MARCELO   Administration


 


Hydromorphone HCl  8 mg  03/05/18 15:58  03/06/18 14:52





  Dilaudid -  PO   8 mg





  Q8H PRN   Administration





  PAIN   


 


Morphine Sulfate  30 mg  03/05/18 19:00  03/07/18 06:02





  Ms Contin -  PO   30 mg





  BID@0600,1800 MARCELO   Administration


 


Prednisone  40 mg  03/06/18 10:00  03/07/18 10:12





  Deltasone -  PO   40 mg





  DAILY MARCELO   Administration


 


Ranolazine  500 mg  03/05/18 22:00  03/07/18 10:11





  Ranexa -  PO   500 mg





  BID MARCELO   Administration


 


Rosuvastatin Calcium  20 mg  03/06/18 22:00  03/06/18 21:56





  Crestor -  PO   20 mg





  HS MARCELO   Administration


 


Senna  2 tab  03/06/18 20:11  





  Senna -  PO   





  HS PRN   





  CONSTIPATION   


 


Tamsulosin HCl  0.4 mg  03/06/18 10:00  03/07/18 10:12





  Flomax -  PO   0.4 mg





  DAILY MARCELO   Administration


 


Thyroid  60 mg  03/06/18 10:11  03/07/18 06:09





  [place] Thyroid -  PO   60 mg





  DAILY@0700 MARCELO   Administration








 Vital Signs











 Period  Temp  Pulse  Resp  BP Sys/Abel  Pulse Ox


 


 Last 24 Hr  97.4 F-97.9 F    19-20  /45-78  97











nad, calm


jvd tds but does not appear elevated. 


rrr s1s2 no mrg


bibasilar dry rales. nl effort


aaox3


no le e/c/c


abd nt nd pos bs


no jaundice diaphoresis











 Laboratory Last Values











WBC  5.3 K/mm3 (4.0-10.0)   03/06/18  06:30    


 


RBC  4.02 M/mm3 (4.00-5.60)   03/06/18  06:30    


 


Hgb  12.9 GM/dL (11.7-16.9)   03/06/18  06:30    


 


Hct  38.4 % (35.4-49)   03/06/18  06:30    


 


MCV  95.5 fl (80-96)   03/06/18  06:30    


 


MCH  32.0 pg (25.7-33.7)   03/06/18  06:30    


 


MCHC  33.5 g/dl (32.0-35.9)   03/06/18  06:30    


 


RDW  14.6 % (11.9-15.9)   03/06/18  06:30    


 


Plt Count  180 K/MM3 (134-434)   03/06/18  06:30    


 


MPV  7.8 fl (7.5-11.1)   03/06/18  06:30    


 


Neutrophils %  58.6 % (42.8-82.8)   03/06/18  06:30    


 


Lymphocytes %  23.3 % (8-40)  D 03/06/18  06:30    


 


Monocytes %  14.3 % (3.8-10.2)  H  03/06/18  06:30    


 


Eosinophils %  3.1 % (0-4.5)  D 03/06/18  06:30    


 


Basophils %  0.7 % (0-2.0)   03/06/18  06:30    


 


PT with INR  16.80 SEC (9.98-11.88)  H  03/06/18  06:30    


 


INR  1.49  (0.82-1.09)  H  03/06/18  06:30    


 


VBG pH  7.37  (7.32-7.42)   03/05/18  11:34    


 


POC VBG pCO2  53.1 mmHg (38-52)  H  03/05/18  11:34    


 


POC VBG pO2  17.3 mmHg (28-48)  L*  03/05/18  11:34    


 


Mixed VBG HCO3  30.0 meq/L (19-25)  H  03/05/18  11:34    


 


Sodium  142 mmol/L (136-145)   03/06/18  06:30    


 


Potassium  4.1 mmol/L (3.5-5.1)   03/06/18  06:30    


 


Chloride  106 mmol/L ()   03/06/18  06:30    


 


Carbon Dioxide  31 mmol/L (21-32)   03/06/18  06:30    


 


Anion Gap  5  (8-16)  L  03/06/18  06:30    


 


BUN  13 mg/dL (7-18)  D 03/06/18  06:30    


 


Creatinine  1.2 mg/dL (0.7-1.3)   03/06/18  06:30    


 


Creat Clearance w eGFR  58.71  (>60)   03/06/18  06:30    


 


Random Glucose  85 mg/dL ()  D 03/06/18  06:30    


 


Hemoglobin A1c %  6.2 % (4.8-6.0)  H D 03/06/18  06:30    


 


Lactic Acid  1.6 mmol/L (0.0-2.0)   03/05/18  11:34    


 


Calcium  7.9 mg/dL (8.5-10.1)  L  03/06/18  06:30    


 


Phosphorus  3.1 mg/dL (2.5-4.9)  D 03/06/18  06:30    


 


Magnesium  2.3 mg/dL (1.8-2.4)   03/06/18  06:30    


 


Total Bilirubin  0.4 mg/dL (0.2-1.0)  D 03/06/18  06:30    


 


AST  9 U/L (15-37)  L  03/06/18  06:30    


 


ALT  18 U/L (12-78)   03/06/18  06:30    


 


Alkaline Phosphatase  58 U/L ()  D 03/06/18  06:30    


 


Creatine Kinase  30 IU/L ()  L  03/06/18  17:30    


 


Troponin I  0.58 ng/ml (0.00-0.05)  H  03/06/18  17:30    


 


B-Natriuretic Peptide  236.80 pg/ml (5-450)   03/05/18  11:34    


 


Total Protein  5.5 g/dl (6.4-8.2)  L  03/06/18  06:30    


 


Albumin  2.7 g/dl (3.4-5.0)  L  03/06/18  06:30    


 


Triglycerides  101 mg/dL ()  D 03/06/18  06:30    


 


Cholesterol  139 mg/dL ()   03/06/18  06:30    


 


Total LDL Cholesterol  80 mg/dL (5-100)  D 03/06/18  06:30    


 


HDL Cholesterol  45 mg/dL (40-60)   03/06/18  06:30    


 


Lipase  115 U/L ()   03/05/18  11:34    


 


TSH  4.70 uIU/ml (0.358-3.74)  H D 03/06/18  06:30    


 


Urine Color  Yellow   03/05/18  13:05    


 


Urine Appearance  Slcloudy   03/05/18  13:05    


 


Urine pH  7.0  (5.0-8.0)   03/05/18  13:05    


 


Ur Specific Gravity  1.013  (1.001-1.035)   03/05/18  13:05    


 


Urine Protein  Negative  (NEGATIVE)   03/05/18  13:05    


 


Urine Glucose (UA)  Negative  (NEGATIVE)   03/05/18  13:05    


 


Urine Ketones  Negative  (NEGATIVE)   03/05/18  13:05    


 


Urine Blood  Negative  (NEGATIVE)   03/05/18  13:05    


 


Urine Nitrite  Negative  (NEGATIVE)   03/05/18  13:05    


 


Urine Bilirubin  Negative  (NEGATIVE)   03/05/18  13:05    


 


Urine Urobilinogen  2.0 mg/dL (0.2-1.0)   03/05/18  13:05    


 


Ur Leukocyte Esterase  Negative  (NEGATIVE)   03/05/18  13:05    


 


Digoxin  1.2400 ng/ml (0.8-2.0)   03/06/18  06:30    














ecg: sr, non-specific t wave abnormalities - similar to priors.  no acute 

ischemic changes. 


2/27/18 ekg [facility]:  SVT, 181 bpm. subtle lateral ST abnormalities/STD  


tele:   SR





cxr:  scarring vs. left early lobe infiltrate. 


3/6 cxr: slightly better aeration at left base.  apical pleural thickening.     





PFTs [facility] 2/2018: moderate restrictive defect with mild decrease in diffusion 

capacity.  


CTA chest [facility] 2/2018: suboptimal opacification of pulmonary arterial tree 

limiting evaluation.  curvilinear filling defect in segmental branch of RLL 

similar to 2015 cta - may reflect chronic PE.  No acute PE.  + aortic 

atherosclerosis.  prominent calcification of cors.  low lung volumes.  

peripheral/basilar reticular opacities, honeycombing and traction 

bronchiectasis within bilateral lower lobes.  scattered atelectasis/scarring.  

RML nodule.  mediastinal LAD, somewhat similar to 2015 CT.  


LE dopplers 2/2018:  B LE DVT's


Echo 2/2018: tds.  mild LVE.  nl lv fn.  EF 60-65%.  nl rv size/fn.  small 

pericardial effusion.  





Echo here 3/2018: tds. no pericardial effusion.  





echo 5/2016: suboptimal views.  EF 50-55%. Impaired relaxation. Nl RV (

suboptimal views). Nl valves. Mild ao dilation 3.8 cm with laminar 

atherosclerotic plaque. 








pharm stress 2016: No sx's.  No ischemic ekg changes. Small subtle 

inferolateral ischemia.  EF 57%.  








A/P





78 yo m hx of CAD with multiple stents [place] [facility] [place]

, residual dz: 40%pLCX, 50%pLAD, 60%mLAD, 80%D1, 40%pRCA), htn, hld, chronic 

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

PE/DVT S/P IVC Filter(2003) on eliquis, gerd, gastroparesis, BPH, Prostate Ca, 

hypothyroid, Chronic Pain Syndrome, Anxiety, OA here with sob  





CAD with prior pci (last 2012)/Trop elevation/NSTEMI  


- persistent elevation with flat trend.  EKG without acute ischemic changes.  


- reviewed [facility] records.  initial trop not elevated.  had episode of SVT to 180'

s on 2/27, subsequent trops not drawn.  Current elevation in troponin has 

overall flat trend with nl CK.  Likely had troponin lead at that time and now 

with residual elevation.  ddx.  myopericarditis (crp elevated).  echo here tds.

  


- low suspicion for acute ACS here as mentioned, but added ASA for underlying 

CAD/demand ischemia (also on eliquis).  Will likely need ischemic evaluation 

for risk stratification once acute issues resolve.   


- con't statin.  





sob


- no signs of acute diastolic hf exacerbation.  resumed home po lasix. 


- ischemic evaluation as above.  


- infectious work up per pmd/ID. 





SVT


- Episode of SVT up to 180's on [facility] admit 2/27/2018.  Cr at the time had peak 

of 1.75.  CRP at the time 27.4.  Troponins prior to episode and at time of 

episode were negative, but no trops drawn after episode.  


- per patient was started on diltiazem and digoxin (pt has new meds with him 

here). prior toprol was not continued.  





Bilateral DVT


- recent CTA at [facility] with no evidence of acute PE.  has h/o factor V leiden.  con

't eliquis.  








can "[employer] tele

## 2018-03-07 NOTE — PN
Progress Note (short form)





- Note


Progress Note: 





PULMONARY





Still with shortness of breath, nonproductive cough and wheezing. 





 Last Vital Signs











Temp Pulse Resp BP Pulse Ox


 


 97.9 F   86   19   115/58   97 


 


 03/07/18 05:00  03/07/18 10:11  03/07/18 05:53  03/07/18 05:53  03/06/18 21:00








Gen:  NAD at rest


Heart: RRR


Lung: decreased breath sounds at the bases


Abd: soft, nontender


Ext: no edema





 CBC, BMP





 03/07/18 10:50 





Active Medications





Albuterol/Ipratropium (Duoneb -)  1 amp NEB RQID [facility]


   Last Admin: 03/07/18 08:15 Dose:  1 amp


Apixaban (Eliquis -)  5 mg PO BID [facility]


   Last Admin: 03/07/18 10:12 Dose:  5 mg


Aspirin (Asa -)  81 mg PO DAILY [facility]


   Last Admin: 03/07/18 10:12 Dose:  81 mg


Digoxin (Lanoxin -)  0.25 mg PO DAILY [facility]


   Last Admin: 03/07/18 10:11 Dose:  0.25 mg


Diltiazem HCl (Cardizem -)  30 mg PO TID [facility]


   Last Admin: 03/07/18 06:02 Dose:  30 mg


Docusate Sodium (Colace -)  100 mg PO TID [facility]


   Last Admin: 03/07/18 06:02 Dose:  100 mg


Fluticasone Propionate (Flonase -)  1 spray NS BID [facility]


Furosemide (Lasix -)  40 mg PO DAILY [facility]


   Last Admin: 03/07/18 10:12 Dose:  40 mg


Hydromorphone HCl (Dilaudid -)  8 mg PO Q8H PRN


   PRN Reason: PAIN


   Last Admin: 03/06/18 14:52 Dose:  8 mg


Morphine Sulfate (Ms Contin -)  30 mg PO BID@0600,1800 [facility]


   Last Admin: 03/07/18 06:02 Dose:  30 mg


Prednisone (Deltasone -)  40 mg PO DAILY [facility]


   Last Admin: 03/07/18 10:12 Dose:  40 mg


Ranolazine (Ranexa -)  500 mg PO BID [facility]


   Last Admin: 03/07/18 10:11 Dose:  500 mg


Rosuvastatin Calcium (Crestor -)  20 mg PO HS [facility]


   Last Admin: 03/06/18 21:56 Dose:  20 mg


Senna (Senna -)  2 tab PO HS PRN


   PRN Reason: CONSTIPATION


Tamsulosin HCl (Flomax -)  0.4 mg PO DAILY [facility]


   Last Admin: 03/07/18 10:12 Dose:  0.4 mg


Thyroid ([place] Thyroid -)  60 mg PO DAILY@0700 [facility]


   Last Admin: 03/07/18 06:09 Dose:  60 mg





A/P


Acute COPD Exacerbation


Bronchiectasis


Interstitial Lung Disease


CAD s/p multiple stents


Factor V Leiden Deficiency


h/o DVT/PE


s/p IVC Filter


h/o CVA





-  will change steroids to IV


-  empiric azithromycin


-  inhaled bronchodilators


-  O2 to keep Spo2 >90%


-  continue anticoagulation

## 2018-03-07 NOTE — PN
Physical Exam: 


SUBJECTIVE: Patient seen and examined. He feels sob, nasal congestion, flushed 

and RLQ pain. 








OBJECTIVE:





 Vital Signs











 Period  Temp  Pulse  Resp  BP Sys/Abel  Pulse Ox


 


 Last 24 Hr  97.4 F-97.9 F    19-20  /45-78  97








PE


Neuro: alert, awake, cn 2-12intact


Pulm: diminished bases 


 CV: s1 s2 rrr 


Abd: RLQ tenderness to palpation, soft, + bs


: R flank tenderness 


Ext: warm, no le edema 


 Laboratory Results - last 24 hr











  03/06/18 03/06/18 03/06/18





  06:30 06:30 17:30


 


Sodium  142  


 


Potassium  4.1  


 


Chloride  106  


 


Carbon Dioxide  31  


 


Anion Gap  5 L  


 


BUN  13  D  


 


Creatinine  1.2  


 


Creat Clearance w eGFR  58.71  


 


Random Glucose  85  D  


 


Calcium  7.9 L  


 


Phosphorus  3.1  D  


 


Magnesium  2.3  


 


Total Bilirubin  0.4  D  


 


AST  9 L  


 


ALT  18  


 


Alkaline Phosphatase  58  D  


 


Creatine Kinase    30 L


 


Troponin I    0.58 H


 


Total Protein  5.5 L  


 


Albumin  2.7 L  


 


Triglycerides  101  D  


 


Cholesterol  139  


 


Total LDL Cholesterol  80  D  


 


HDL Cholesterol  45  


 


TSH  4.70 H D  Cancelled 


 


Digoxin  1.2400  








Active Medications











Generic Name Dose Route Start Last Admin





  Trade Name Freq  PRN Reason Stop Dose Admin


 


Albuterol/Ipratropium  1 amp  03/05/18 20:00  03/07/18 08:15





  Duoneb -  NEB   1 amp





  RQID MARCELO   Administration


 


Apixaban  5 mg  03/05/18 22:00  03/07/18 10:12





  Eliquis -  PO   5 mg





  BID MARCELO   Administration


 


Aspirin  81 mg  03/05/18 19:00  03/07/18 10:12





  Asa -  PO   81 mg





  DAILY MARCELO   Administration


 


Digoxin  0.25 mg  03/06/18 10:00  03/07/18 10:11





  Lanoxin -  PO   0.25 mg





  DAILY MARCELO   Administration


 


Diltiazem HCl  30 mg  03/05/18 22:00  03/07/18 06:02





  Cardizem -  PO   30 mg





  TID MARCELO   Administration


 


Docusate Sodium  100 mg  03/06/18 22:00  03/07/18 06:02





  Colace -  PO   100 mg





  TID MARCELO   Administration


 


Fluticasone Propionate  1 spray  03/07/18 11:00  





  Flonase -  NS   





  BID MARCELO   


 


Furosemide  40 mg  03/06/18 10:00  03/07/18 10:12





  Lasix -  PO   40 mg





  DAILY MARCELO   Administration


 


Hydromorphone HCl  8 mg  03/05/18 15:58  03/06/18 14:52





  Dilaudid -  PO   8 mg





  Q8H PRN   Administration





  PAIN   


 


Morphine Sulfate  30 mg  03/05/18 19:00  03/07/18 06:02





  Ms Contin -  PO   30 mg





  BID@0600,1800 MARCELO   Administration


 


Prednisone  40 mg  03/06/18 10:00  03/07/18 10:12





  Deltasone -  PO   40 mg





  DAILY MARCELO   Administration


 


Ranolazine  500 mg  03/05/18 22:00  03/07/18 10:11





  Ranexa -  PO   500 mg





  BID MARCELO   Administration


 


Rosuvastatin Calcium  20 mg  03/06/18 22:00  03/06/18 21:56





  Crestor -  PO   20 mg





  HS MARCELO   Administration


 


Senna  2 tab  03/06/18 20:11  





  Senna -  PO   





  HS PRN   





  CONSTIPATION   


 


Tamsulosin HCl  0.4 mg  03/06/18 10:00  03/07/18 10:12





  Flomax -  PO   0.4 mg





  DAILY MARCELO   Administration


 


Thyroid  60 mg  03/06/18 10:11  03/07/18 06:09





  [place] Thyroid -  PO   60 mg





  DAILY@0700 MARCELO   Administration











Assessment: 77 year old male with a significant past medical history of CVA, 

CAD s/p stents x 2, factor V leiden, diverticulitis, DVT (on Elliquis 5mg BID), 

GERD, kidney stones, gastroparesis, BPH, Prostate Ca, hypothyroid, Chronic Pain 

Syndrome, Anxiety, OA here with sob  MI, HTN, CVA, and COPD (home oxygen 

dependent) admitted with worsening sob 





Plan: 





1. Acute COPD exacerbation/interstitial lung disease, bronchiectasis


- CTA from [place] shows interstitial lung disease   


- Change to IV steroids 60mg q8


- Start empiric azithro


- Albuterol nebs qid 


- CT chest eval for malignancy 


- ID/pulm seeing 





2. CAD s/p multiple stents 


- Started ASA with eliquis for demand ischemia 


- Statin 


- Ranexa 500mg BID 





3. hx of b/l DVT/factor V leiden deficiency 


- s/p IVC filter 2003


- Eliquis 5mg BID 


- recent imaging at  negative for dvt 





4. Elevated trop


- Flat trend 


- In future will need ischemic eval 





5. Diastolic CHF 


- Not in exacerbation 


- Lasix 40mg day





6. SVT


- Dx at [facility] 


- Digoxin 0.25mg daily , cardizem 30mg TID 





7. RLQ pain , R flank pain- chronic 


- CTAP ordered, also r/o malignancy





8. DVt


- Eliquis 





9. BPH


- Flomax





10. Hypothyroid 


- [place] Thyroid 





Visit type





- Emergency Visit


Emergency Visit: Yes


ED Registration Date: 03/05/18


Care time: The patient presented to the Emergency Department on the above date 

and was hospitalized for further evaluation of their emergent condition.





- New Patient


This patient is new to me today: Yes


Date on this admission: 03/07/18





- Critical Care


Critical Care patient: No

## 2018-03-07 NOTE — PN
Progress Note, Physician


History of Present Illness: 





says he is feeling a little better


no other issues


still weak





- Current Medication List


Current Medications: 


Active Medications





Albuterol/Ipratropium (Duoneb -)  1 amp NEB RQID [facility]


   Last Admin: 03/07/18 16:44 Dose:  1 amp


Apixaban (Eliquis -)  5 mg PO BID [facility]


   Last Admin: 03/07/18 10:12 Dose:  5 mg


Aspirin (Asa -)  81 mg PO DAILY [facility]


   Last Admin: 03/07/18 10:12 Dose:  81 mg


Digoxin (Lanoxin -)  0.25 mg PO DAILY [facility]


   Last Admin: 03/07/18 10:11 Dose:  0.25 mg


Diltiazem HCl (Cardizem -)  30 mg PO TID [facility]


   Last Admin: 03/07/18 13:36 Dose:  30 mg


Docusate Sodium (Colace -)  100 mg PO TID [facility]


   Last Admin: 03/07/18 13:36 Dose:  100 mg


Fluticasone Propionate (Flonase -)  1 spray NS BID [facility]


   Last Admin: 03/07/18 13:35 Dose:  2 inh


Furosemide (Lasix -)  40 mg PO DAILY [facility]


   Last Admin: 03/07/18 10:12 Dose:  40 mg


Hydromorphone HCl (Dilaudid -)  8 mg PO Q8H PRN


   PRN Reason: PAIN


   Last Admin: 03/07/18 15:39 Dose:  8 mg


Azithromycin 500 mg/ Dextrose  250 mls @ 250 mls/hr IVPB DAILY [facility]


   Last Admin: 03/07/18 13:36 Dose:  250 mls/hr


Methylprednisolone Sodium Succinate (Solu-Medrol -)  60 mg IVPUSH Q8H-IV [facility]


   Last Admin: 03/07/18 11:36 Dose:  60 mg


Morphine Sulfate (Ms Contin -)  30 mg PO BID@0600,1800 [facility]


   Last Admin: 03/07/18 06:02 Dose:  30 mg


Ranolazine (Ranexa -)  500 mg PO BID [facility]


   Last Admin: 03/07/18 10:11 Dose:  500 mg


Rosuvastatin Calcium (Crestor -)  20 mg PO HS [facility]


   Last Admin: 03/06/18 21:56 Dose:  20 mg


Senna (Senna -)  2 tab PO HS PRN


   PRN Reason: CONSTIPATION


Tamsulosin HCl (Flomax -)  0.4 mg PO DAILY [facility]


   Last Admin: 03/07/18 10:12 Dose:  0.4 mg


Thyroid ([place] Thyroid -)  60 mg PO DAILY@0700 [facility]


   Last Admin: 03/07/18 06:09 Dose:  60 mg











- Objective


Vital Signs: 


 Vital Signs











Temperature  98.6 F   03/07/18 14:00


 


Pulse Rate  89   03/07/18 14:00


 


Respiratory Rate  19   03/07/18 05:53


 


Blood Pressure  121/58   03/07/18 14:00


 


O2 Sat by Pulse Oximetry (%)  97   03/06/18 21:00











Constitutional: Yes: Calm, Mild Distress


Cardiovascular: Yes: Regular Rate and Rhythm


Respiratory: Yes: Regular, Poor Air Entry, Other (crackles)


Gastrointestinal: Yes: Normal Bowel Sounds, Soft


Musculoskeletal: Yes: WNL


Extremities: Yes: WNL


Neurological: Yes: Alert, Oriented


Psychiatric: Yes: Alert, Oriented


Labs: 


 CBC, BMP





 03/07/18 10:50 





 03/07/18 10:50 





 INR, PTT











INR  1.49  (0.82-1.09)  H  03/06/18  06:30    














- ....Imaging


Cat Scan: Report Reviewed, Image Reviewed





Assessment/Plan





after evaluating the patient i have low suspicion of infection.there is 

something in the left lower lobe





dyspnea


ll infiltrate


prostate ca


htn


hld


gm positive bacteremia





blood cx reports noted


only one cx positive


will ask for repeat blood cx as could be contaminant





plan


continue current mgmt


continue monitoring'


rest as per primary team


incnetive marcello


ct scan results noted

## 2018-03-08 RX ADMIN — DOCUSATE SODIUM SCH MG: 100 CAPSULE, LIQUID FILLED ORAL at 13:16

## 2018-03-08 RX ADMIN — MORPHINE SULFATE SCH MG: 30 TABLET, EXTENDED RELEASE ORAL at 06:16

## 2018-03-08 RX ADMIN — FLUTICASONE PROPIONATE SCH INH: 50 SPRAY, METERED NASAL at 21:07

## 2018-03-08 RX ADMIN — AZITHROMYCIN DIHYDRATE SCH MLS/HR: 500 INJECTION, POWDER, LYOPHILIZED, FOR SOLUTION INTRAVENOUS at 08:59

## 2018-03-08 RX ADMIN — DILTIAZEM HYDROCHLORIDE SCH MG: 30 TABLET, FILM COATED ORAL at 21:07

## 2018-03-08 RX ADMIN — TAMSULOSIN HYDROCHLORIDE SCH MG: 0.4 CAPSULE ORAL at 09:00

## 2018-03-08 RX ADMIN — DOCUSATE SODIUM SCH MG: 100 CAPSULE, LIQUID FILLED ORAL at 21:07

## 2018-03-08 RX ADMIN — MORPHINE SULFATE SCH MG: 30 TABLET, EXTENDED RELEASE ORAL at 17:17

## 2018-03-08 RX ADMIN — Medication SCH MG: at 06:00

## 2018-03-08 RX ADMIN — METHYLPREDNISOLONE SODIUM SUCCINATE SCH MG: 125 INJECTION, POWDER, FOR SOLUTION INTRAMUSCULAR; INTRAVENOUS at 09:01

## 2018-03-08 RX ADMIN — METHYLPREDNISOLONE SODIUM SUCCINATE SCH MG: 125 INJECTION, POWDER, FOR SOLUTION INTRAMUSCULAR; INTRAVENOUS at 17:17

## 2018-03-08 RX ADMIN — RANOLAZINE SCH MG: 500 TABLET, FILM COATED, EXTENDED RELEASE ORAL at 21:07

## 2018-03-08 RX ADMIN — APIXABAN SCH MG: 5 TABLET, FILM COATED ORAL at 09:00

## 2018-03-08 RX ADMIN — FLUTICASONE PROPIONATE SCH INH: 50 SPRAY, METERED NASAL at 09:01

## 2018-03-08 RX ADMIN — DILTIAZEM HYDROCHLORIDE SCH MG: 30 TABLET, FILM COATED ORAL at 06:15

## 2018-03-08 RX ADMIN — Medication PRN MG: at 00:32

## 2018-03-08 RX ADMIN — DILTIAZEM HYDROCHLORIDE SCH MG: 30 TABLET, FILM COATED ORAL at 13:16

## 2018-03-08 RX ADMIN — IPRATROPIUM BROMIDE AND ALBUTEROL SULFATE SCH AMP: .5; 3 SOLUTION RESPIRATORY (INHALATION) at 15:59

## 2018-03-08 RX ADMIN — RANOLAZINE SCH MG: 500 TABLET, FILM COATED, EXTENDED RELEASE ORAL at 09:00

## 2018-03-08 RX ADMIN — IPRATROPIUM BROMIDE AND ALBUTEROL SULFATE SCH AMP: .5; 3 SOLUTION RESPIRATORY (INHALATION) at 20:47

## 2018-03-08 RX ADMIN — METHYLPREDNISOLONE SODIUM SUCCINATE SCH MG: 125 INJECTION, POWDER, FOR SOLUTION INTRAMUSCULAR; INTRAVENOUS at 03:00

## 2018-03-08 RX ADMIN — DIGOXIN SCH MG: 250 TABLET ORAL at 09:00

## 2018-03-08 RX ADMIN — IPRATROPIUM BROMIDE AND ALBUTEROL SULFATE SCH AMP: .5; 3 SOLUTION RESPIRATORY (INHALATION) at 11:18

## 2018-03-08 RX ADMIN — ROSUVASTATIN CALCIUM SCH MG: 20 TABLET, FILM COATED ORAL at 21:07

## 2018-03-08 RX ADMIN — APIXABAN SCH MG: 5 TABLET, FILM COATED ORAL at 21:07

## 2018-03-08 RX ADMIN — SENNOSIDES PRN TAB: 8.6 TABLET, FILM COATED ORAL at 21:10

## 2018-03-08 RX ADMIN — DOCUSATE SODIUM SCH MG: 100 CAPSULE, LIQUID FILLED ORAL at 06:15

## 2018-03-08 RX ADMIN — IPRATROPIUM BROMIDE AND ALBUTEROL SULFATE SCH AMP: .5; 3 SOLUTION RESPIRATORY (INHALATION) at 07:27

## 2018-03-08 RX ADMIN — FUROSEMIDE SCH MG: 40 TABLET ORAL at 09:00

## 2018-03-08 RX ADMIN — ASPIRIN 81 MG SCH MG: 81 TABLET ORAL at 09:00

## 2018-03-08 NOTE — PN
Progress Note, Physician


History of Present Illness: 





patient c/o of sore throat


constipation


still feeling weak


but says otherwise feels better





- Current Medication List


Current Medications: 


Active Medications





Albuterol/Ipratropium (Duoneb -)  1 amp NEB RQID [facility]


   Last Admin: 03/08/18 11:18 Dose:  1 amp


Apixaban (Eliquis -)  5 mg PO BID [facility]


   Last Admin: 03/08/18 09:00 Dose:  5 mg


Aspirin (Asa -)  81 mg PO DAILY [facility]


   Last Admin: 03/08/18 09:00 Dose:  81 mg


Digoxin (Lanoxin -)  0.25 mg PO DAILY [facility]


   Last Admin: 03/08/18 09:00 Dose:  0.25 mg


Diltiazem HCl (Cardizem -)  30 mg PO TID [facility]


   Last Admin: 03/08/18 13:16 Dose:  30 mg


Docusate Sodium (Colace -)  100 mg PO TID [facility]


   Last Admin: 03/08/18 13:16 Dose:  100 mg


Fluticasone Propionate (Flonase -)  1 spray NS BID [facility]


   Last Admin: 03/08/18 09:01 Dose:  1 inh


Furosemide (Lasix -)  40 mg PO DAILY [facility]


   Last Admin: 03/08/18 09:00 Dose:  40 mg


Hydromorphone HCl (Dilaudid -)  8 mg PO Q8H PRN


   PRN Reason: PAIN


   Last Admin: 03/07/18 15:39 Dose:  8 mg


Azithromycin 500 mg/ Dextrose  250 mls @ 250 mls/hr IVPB DAILY [facility]


   Last Admin: 03/08/18 08:59 Dose:  250 mls/hr


Melatonin (Melatonin)  5 mg PO HS PRN


   PRN Reason: INSOMNIA


   Last Admin: 03/08/18 00:32 Dose:  5 mg


Methylprednisolone Sodium Succinate (Solu-Medrol -)  40 mg IVPUSH Q8H-IV [facility]


Morphine Sulfate (Ms Contin -)  30 mg PO BID@0600,1800 [facility]


   Last Admin: 03/08/18 06:16 Dose:  30 mg


Ranolazine (Ranexa -)  500 mg PO BID [facility]


   Last Admin: 03/08/18 09:00 Dose:  500 mg


Rosuvastatin Calcium (Crestor -)  20 mg PO HS [facility]


   Last Admin: 03/07/18 21:47 Dose:  20 mg


Senna (Senna -)  2 tab PO HS PRN


   PRN Reason: CONSTIPATION


   Last Admin: 03/07/18 21:48 Dose:  2 tab


Tamsulosin HCl (Flomax -)  0.4 mg PO DAILY [facility]


   Last Admin: 03/08/18 09:00 Dose:  0.4 mg


Thyroid ([place] Thyroid -)  60 mg PO DAILY@0700 [facility]


   Last Admin: 03/08/18 06:00 Dose:  60 mg











- Objective


Vital Signs: 


 Vital Signs











Temperature  97.6 F   03/08/18 14:00


 


Pulse Rate  87   03/08/18 14:00


 


Respiratory Rate  20   03/08/18 05:00


 


Blood Pressure  121/62   03/08/18 14:00


 


O2 Sat by Pulse Oximetry (%)  94 L  03/07/18 20:17











Constitutional: Yes: Calm, Mild Distress


Cardiovascular: Yes: Regular Rate and Rhythm


Respiratory: Yes: Regular, On Nasal O2, Poor Air Entry, Other (crackles)


Gastrointestinal: Yes: Normal Bowel Sounds, Soft


Musculoskeletal: Yes: WNL


Extremities: Yes: WNL


Neurological: Yes: Alert, Oriented


Psychiatric: Yes: Alert, Oriented


Labs: 


 CBC, BMP





 03/07/18 10:50 





 03/07/18 10:50 





 INR, PTT











INR  1.49  (0.82-1.09)  H  03/06/18  06:30    














Assessment/Plan











dyspnea


ll infiltrate


prostate ca


htn


hld


gm positive bacteremia





repeat blood cx negative








plan


continue current mgmt


continue monitoring'


rest as per primary team


look at the throat

## 2018-03-08 NOTE — PN
Physical Exam: 


SUBJECTIVE: Patient seen and examined. He feels abdominal discomfort d/t no 

bowel movement. His is less sob. 








OBJECTIVE:





 Vital Signs











 Period  Temp  Pulse  Resp  BP Sys/Abel  Pulse Ox


 


 Last 24 Hr  97.5 F-98.9 F  78-90  20-20  115-132/55-65  94








PE


Neuro: alert, awake, cn 2-12intact


Pulm: diminished however clear, no sob, wheezing


CV: s1 s2 rrr 


Abd: RLQ mild tenderness to palpation, soft, + bs


Ext: warm, no le edema





Active Medications











Generic Name Dose Route Start Last Admin





  Trade Name Freq  PRN Reason Stop Dose Admin


 


Albuterol/Ipratropium  1 amp  03/05/18 20:00  03/08/18 11:18





  Duoneb -  NEB   1 amp





  RQID MARCELO   Administration


 


Apixaban  5 mg  03/05/18 22:00  03/08/18 09:00





  Eliquis -  PO   5 mg





  BID MARCELO   Administration


 


Aspirin  81 mg  03/05/18 19:00  03/08/18 09:00





  Asa -  PO   81 mg





  DAILY MARCELO   Administration


 


Digoxin  0.25 mg  03/06/18 10:00  03/08/18 09:00





  Lanoxin -  PO   0.25 mg





  DAILY MARCELO   Administration


 


Diltiazem HCl  30 mg  03/05/18 22:00  03/08/18 13:16





  Cardizem -  PO   30 mg





  TID MARCELO   Administration


 


Docusate Sodium  100 mg  03/06/18 22:00  03/08/18 13:16





  Colace -  PO   100 mg





  TID MARCELO   Administration


 


Fluticasone Propionate  1 spray  03/07/18 11:00  03/08/18 09:01





  Flonase -  NS   1 inh





  BID MARCELO   Administration


 


Furosemide  40 mg  03/06/18 10:00  03/08/18 09:00





  Lasix -  PO   40 mg





  DAILY MARCELO   Administration


 


Hydromorphone HCl  8 mg  03/05/18 15:58  03/07/18 15:39





  Dilaudid -  PO   8 mg





  Q8H PRN   Administration





  PAIN   


 


Azithromycin 500 mg/ Dextrose  250 mls @ 250 mls/hr  03/07/18 12:00  03/08/18 08

:59





  IVPB   250 mls/hr





  DAILY MARCELO   Administration


 


Magnesium Hydroxide  30 ml  03/08/18 15:00  





  Milk Of Magnesia -  PO  03/08/18 15:01  





  ONCE ONE   


 


Melatonin  5 mg  03/08/18 00:09  03/08/18 00:32





  Melatonin  PO   5 mg





  HS PRN   Administration





  INSOMNIA   


 


Methylprednisolone Sodium Succinate  60 mg  03/07/18 11:30  03/08/18 09:01





  Solu-Medrol -  IVPUSH   60 mg





  Q8H-IV MARCELO   Administration


 


Morphine Sulfate  30 mg  03/05/18 19:00  03/08/18 06:16





  Ms Contin -  PO   30 mg





  BID@0600,1800 MARCELO   Administration


 


Ranolazine  500 mg  03/05/18 22:00  03/08/18 09:00





  Ranexa -  PO   500 mg





  BID MARCELO   Administration


 


Rosuvastatin Calcium  20 mg  03/06/18 22:00  03/07/18 21:47





  Crestor -  PO   20 mg





  HS MARCELO   Administration


 


Senna  2 tab  03/06/18 20:11  03/07/18 21:48





  Senna -  PO   2 tab





  HS PRN   Administration





  CONSTIPATION   


 


Tamsulosin HCl  0.4 mg  03/06/18 10:00  03/08/18 09:00





  Flomax -  PO   0.4 mg





  DAILY MARCELO   Administration


 


Thyroid  60 mg  03/06/18 10:11  03/08/18 06:00





  [place] Thyroid -  PO   60 mg





  DAILY@0700 MARCELO   Administration








Assessment: 77 year old male with a significant past medical history of CVA, 

CAD s/p stents x 2, factor V leiden, diverticulitis, DVT (on Elliquis 5mg BID), 

GERD, kidney stones, gastroparesis, BPH, Prostate Ca, hypothyroid, Chronic Pain 

Syndrome, Anxiety, OA here with sob  MI, HTN, CVA, and COPD (home oxygen 

dependent) admitted with worsening sob 





Plan: 





1. Acute COPD exacerbation/interstitial lung disease, bronchiectasis


- CTA from [facility] shows interstitial lung disease   


- Taper IV steroids 40mg q8


- Continue empiric azithro (day 2)


- Albuterol nebs qid 


- CT chest noted, nodules appear unchanged 


- ID/pulm seeing 





2. CAD s/p multiple stents 


- Started ASA with eliquis for demand ischemia 


- Statin 


- Ranexa 500mg BID 





3. hx of b/l DVT/factor V leiden deficiency 


- s/p IVC filter 2003


- Eliquis 5mg BID 


- Recent imaging at  negative for dvt 





4. Elevated trop


- Flat trend 


- In future will need ischemic eval per cardiology 





5. Diastolic CHF 


- Not in exacerbation 


- Lasix 40mg day





6. SVT


- Dx at [facility] 


- Digoxin 0.25mg daily , cardizem 30mg TID 





7. RLQ pain , R flank pain- chronic 


- CTAP neg for acute pathology, abd pain could be from constipation 


- Will given mineral enema and milk of mg today 





8. DVt


- Eliquis 





9. BPH


- Flomax





10. Hypothyroid 


- [place] Thyroid 





Visit type





- Emergency Visit


Emergency Visit: Yes


ED Registration Date: 03/05/18


Care time: The patient presented to the Emergency Department on the above date 

and was hospitalized for further evaluation of their emergent condition.





- New Patient


This patient is new to me today: No





- Critical Care


Critical Care patient: No

## 2018-03-08 NOTE — PN
Progress Note, Physician


History of Present Illness: 





pulmonary





alert,feeling better,less dyspneic,less cough-cp





- Current Medication List


Current Medications: 


Active Medications





Albuterol/Ipratropium (Duoneb -)  1 amp NEB RQID [facility]


   Last Admin: 03/08/18 11:18 Dose:  1 amp


Apixaban (Eliquis -)  5 mg PO BID [facility]


   Last Admin: 03/08/18 09:00 Dose:  5 mg


Aspirin (Asa -)  81 mg PO DAILY [facility]


   Last Admin: 03/08/18 09:00 Dose:  81 mg


Digoxin (Lanoxin -)  0.25 mg PO DAILY [facility]


   Last Admin: 03/08/18 09:00 Dose:  0.25 mg


Diltiazem HCl (Cardizem -)  30 mg PO TID [facility]


   Last Admin: 03/08/18 06:15 Dose:  30 mg


Docusate Sodium (Colace -)  100 mg PO TID [facility]


   Last Admin: 03/08/18 06:15 Dose:  100 mg


Fluticasone Propionate (Flonase -)  1 spray NS BID [facility]


   Last Admin: 03/08/18 09:01 Dose:  1 inh


Furosemide (Lasix -)  40 mg PO DAILY [facility]


   Last Admin: 03/08/18 09:00 Dose:  40 mg


Hydromorphone HCl (Dilaudid -)  8 mg PO Q8H PRN


   PRN Reason: PAIN


   Last Admin: 03/07/18 15:39 Dose:  8 mg


Azithromycin 500 mg/ Dextrose  250 mls @ 250 mls/hr IVPB DAILY [facility]


   Last Admin: 03/08/18 08:59 Dose:  250 mls/hr


Melatonin (Melatonin)  5 mg PO HS PRN


   PRN Reason: INSOMNIA


   Last Admin: 03/08/18 00:32 Dose:  5 mg


Methylprednisolone Sodium Succinate (Solu-Medrol -)  60 mg IVPUSH Q8H-IV [facility]


   Last Admin: 03/08/18 09:01 Dose:  60 mg


Morphine Sulfate (Ms Contin -)  30 mg PO BID@0600,1800 [facility]


   Last Admin: 03/08/18 06:16 Dose:  30 mg


Ranolazine (Ranexa -)  500 mg PO BID [facility]


   Last Admin: 03/08/18 09:00 Dose:  500 mg


Rosuvastatin Calcium (Crestor -)  20 mg PO HS [facility]


   Last Admin: 03/07/18 21:47 Dose:  20 mg


Senna (Senna -)  2 tab PO HS PRN


   PRN Reason: CONSTIPATION


   Last Admin: 03/07/18 21:48 Dose:  2 tab


Tamsulosin HCl (Flomax -)  0.4 mg PO DAILY [facility]


   Last Admin: 03/08/18 09:00 Dose:  0.4 mg


Thyroid ([place] Thyroid -)  60 mg PO DAILY@0700 [facility]


   Last Admin: 03/08/18 06:00 Dose:  60 mg











- Objective


Vital Signs: 


 Vital Signs











Temperature  97.6 F   03/08/18 05:00


 


Pulse Rate  90   03/08/18 09:00


 


Respiratory Rate  20   03/08/18 05:00


 


Blood Pressure  132/65   03/08/18 05:00


 


O2 Sat by Pulse Oximetry (%)  94 L  03/07/18 20:17











Constitutional: Yes: Well Nourished, Calm


Eyes: Yes: WNL


HENT: Yes: WNL


Neck: Yes: WNL


Cardiovascular: Yes: Regular Rate and Rhythm, S1, S2


Respiratory: Yes: Rales ( bibasilar crackles)


Gastrointestinal: Yes: Normal Bowel Sounds, Soft


Extremities: Yes: WNL


Edema: No


Labs: 


 





Assessment/Plan





IMP DYSPNEA


      COPD EXACERBATION


      CHF


      ? LLL INFILTRATE


      FACTOR V LEIDEN


      H/O DVT/PE


      ILD


      H/O CVA


      + TROPONINS


      PROSTATE CA


     HLD


     HTN





PLAN O2


       STEROID TAPER


       ABX


       LASIX


       F/U CHEST X-RAYS


 


DR ARREOLA

## 2018-03-08 NOTE — PN
Progress Note (short form)





- Note


Progress Note: 





S: +cough and nasal congestion, less today. no cp palps dizzy; mild sob








 


 Current Medications











Generic Name Dose Route Start Last Admin





  Trade Name Freq  PRN Reason Stop Dose Admin


 


Albuterol/Ipratropium  1 amp  03/05/18 20:00  03/08/18 11:18





  Duoneb -  NEB   1 amp





  RQID MARCELO   Administration


 


Apixaban  5 mg  03/05/18 22:00  03/08/18 09:00





  Eliquis -  PO   5 mg





  BID MARCELO   Administration


 


Aspirin  81 mg  03/05/18 19:00  03/08/18 09:00





  Asa -  PO   81 mg





  DAILY MARCELO   Administration


 


Digoxin  0.25 mg  03/06/18 10:00  03/08/18 09:00





  Lanoxin -  PO   0.25 mg





  DAILY MARCELO   Administration


 


Diltiazem HCl  30 mg  03/05/18 22:00  03/08/18 06:15





  Cardizem -  PO   30 mg





  TID MARCELO   Administration


 


Docusate Sodium  100 mg  03/06/18 22:00  03/08/18 06:15





  Colace -  PO   100 mg





  TID MARCELO   Administration


 


Fluticasone Propionate  1 spray  03/07/18 11:00  03/08/18 09:01





  Flonase -  NS   1 inh





  BID MARCELO   Administration


 


Furosemide  40 mg  03/06/18 10:00  03/08/18 09:00





  Lasix -  PO   40 mg





  DAILY MARCELO   Administration


 


Hydromorphone HCl  8 mg  03/05/18 15:58  03/07/18 15:39





  Dilaudid -  PO   8 mg





  Q8H PRN   Administration





  PAIN   


 


Azithromycin 500 mg/ Dextrose  250 mls @ 250 mls/hr  03/07/18 12:00  03/08/18 08

:59





  IVPB   250 mls/hr





  DAILY MARCELO   Administration


 


Melatonin  5 mg  03/08/18 00:09  03/08/18 00:32





  Melatonin  PO   5 mg





  HS PRN   Administration





  INSOMNIA   


 


Methylprednisolone Sodium Succinate  60 mg  03/07/18 11:30  03/08/18 09:01





  Solu-Medrol -  IVPUSH   60 mg





  Q8H-IV MARCELO   Administration


 


Morphine Sulfate  30 mg  03/05/18 19:00  03/08/18 06:16





  Ms Contin -  PO   30 mg





  BID@0600,1800 MARCELO   Administration


 


Ranolazine  500 mg  03/05/18 22:00  03/08/18 09:00





  Ranexa -  PO   500 mg





  BID MARCELO   Administration


 


Rosuvastatin Calcium  20 mg  03/06/18 22:00  03/07/18 21:47





  Crestor -  PO   20 mg





  HS MARCELO   Administration


 


Senna  2 tab  03/06/18 20:11  03/07/18 21:48





  Senna -  PO   2 tab





  HS PRN   Administration





  CONSTIPATION   


 


Tamsulosin HCl  0.4 mg  03/06/18 10:00  03/08/18 09:00





  Flomax -  PO   0.4 mg





  DAILY MARCELO   Administration


 


Thyroid  60 mg  03/06/18 10:11  03/08/18 06:00





  [place] Thyroid -  PO   60 mg





  DAILY@0700 MARCELO   Administration








 Vital Signs











 Period  Temp  Pulse  Resp  BP Sys/Abel  Pulse Ox


 


 Last 24 Hr  97.5 F-98.9 F  78-90  20-20  115-132/55-65  94








nad, calm


jvd tds but does not appear elevated. 


rrr s1s2 no mrg


bibasilar dry rales. nl effort


aaox3


no le e/c/c


abd nt nd pos bs


no jaundice diaphoresis











 


 Laboratory Last Values











WBC  8.6 K/mm3 (4.0-10.0)  D 03/07/18  10:50    


 


RBC  4.11 M/mm3 (4.00-5.60)   03/07/18  10:50    


 


Hgb  13.0 GM/dL (11.7-16.9)   03/07/18  10:50    


 


Hct  38.6 % (35.4-49)   03/07/18  10:50    


 


MCV  94.0 fl (80-96)   03/07/18  10:50    


 


MCH  31.7 pg (25.7-33.7)   03/07/18  10:50    


 


MCHC  33.7 g/dl (32.0-35.9)   03/07/18  10:50    


 


RDW  14.2 % (11.9-15.9)   03/07/18  10:50    


 


Plt Count  201 K/MM3 (134-434)   03/07/18  10:50    


 


MPV  7.5 fl (7.5-11.1)   03/07/18  10:50    


 


Neutrophils %  66.8 % (42.8-82.8)   03/07/18  10:50    


 


Lymphocytes %  19.8 % (8-40)   03/07/18  10:50    


 


Monocytes %  12.0 % (3.8-10.2)  H  03/07/18  10:50    


 


Eosinophils %  1.0 % (0-4.5)   03/07/18  10:50    


 


Basophils %  0.4 % (0-2.0)   03/07/18  10:50    


 


PT with INR  16.80 SEC (9.98-11.88)  H  03/06/18  06:30    


 


INR  1.49  (0.82-1.09)  H  03/06/18  06:30    


 


VBG pH  7.37  (7.32-7.42)   03/05/18  11:34    


 


POC VBG pCO2  53.1 mmHg (38-52)  H  03/05/18  11:34    


 


POC VBG pO2  17.3 mmHg (28-48)  L*  03/05/18  11:34    


 


Mixed VBG HCO3  30.0 meq/L (19-25)  H  03/05/18  11:34    


 


Sodium  138 mmol/L (136-145)   03/07/18  10:50    


 


Potassium  3.6 mmol/L (3.5-5.1)   03/07/18  10:50    


 


Chloride  101 mmol/L ()   03/07/18  10:50    


 


Carbon Dioxide  27 mmol/L (21-32)   03/07/18  10:50    


 


Anion Gap  10  (8-16)   03/07/18  10:50    


 


BUN  14 mg/dL (7-18)   03/07/18  10:50    


 


Creatinine  1.3 mg/dL (0.7-1.3)   03/07/18  10:50    


 


Creat Clearance w eGFR  53.53  (>60)   03/07/18  10:50    


 


Random Glucose  114 mg/dL ()  H D 03/07/18  10:50    


 


Hemoglobin A1c %  6.2 % (4.8-6.0)  H D 03/06/18  06:30    


 


Lactic Acid  1.6 mmol/L (0.0-2.0)   03/05/18  11:34    


 


Calcium  8.3 mg/dL (8.5-10.1)  L  03/07/18  10:50    


 


Phosphorus  3.1 mg/dL (2.5-4.9)  D 03/06/18  06:30    


 


Magnesium  2.4 mg/dL (1.8-2.4)   03/07/18  10:50    


 


Total Bilirubin  0.6 mg/dL (0.2-1.0)  D 03/07/18  10:50    


 


AST  7 U/L (15-37)  L D 03/07/18  10:50    


 


ALT  17 U/L (12-78)   03/07/18  10:50    


 


Alkaline Phosphatase  61 U/L ()   03/07/18  10:50    


 


Creatine Kinase  30 IU/L ()  L  03/06/18  17:30    


 


Troponin I  0.58 ng/ml (0.00-0.05)  H  03/06/18  17:30    


 


B-Natriuretic Peptide  236.80 pg/ml (5-450)   03/05/18  11:34    


 


Total Protein  5.9 g/dl (6.4-8.2)  L  03/07/18  10:50    


 


Albumin  3.0 g/dl (3.4-5.0)  L  03/07/18  10:50    


 


Triglycerides  101 mg/dL ()  D 03/06/18  06:30    


 


Cholesterol  139 mg/dL ()   03/06/18  06:30    


 


Total LDL Cholesterol  80 mg/dL (5-100)  D 03/06/18  06:30    


 


HDL Cholesterol  45 mg/dL (40-60)   03/06/18  06:30    


 


Lipase  115 U/L ()   03/05/18  11:34    


 


TSH  4.70 uIU/ml (0.358-3.74)  H D 03/06/18  06:30    


 


Urine Color  Yellow   03/05/18  13:05    


 


Urine Appearance  Slcloudy   03/05/18  13:05    


 


Urine pH  7.0  (5.0-8.0)   03/05/18  13:05    


 


Ur Specific Gravity  1.013  (1.001-1.035)   03/05/18  13:05    


 


Urine Protein  Negative  (NEGATIVE)   03/05/18  13:05    


 


Urine Glucose (UA)  Negative  (NEGATIVE)   03/05/18  13:05    


 


Urine Ketones  Negative  (NEGATIVE)   03/05/18  13:05    


 


Urine Blood  Negative  (NEGATIVE)   03/05/18  13:05    


 


Urine Nitrite  Negative  (NEGATIVE)   03/05/18  13:05    


 


Urine Bilirubin  Negative  (NEGATIVE)   03/05/18  13:05    


 


Urine Urobilinogen  2.0 mg/dL (0.2-1.0)   03/05/18  13:05    


 


Ur Leukocyte Esterase  Negative  (NEGATIVE)   03/05/18  13:05    


 


Digoxin  1.2400 ng/ml (0.8-2.0)   03/06/18  06:30    











ecg: sr, non-specific t wave abnormalities - similar to priors.  no acute 

ischemic changes. 


2/27/18 ekg [facility]:  SVT, 181 bpm. subtle lateral ST abnormalities/STD  


tele:   SR





cxr:  scarring vs. left early lobe infiltrate. 


3/6 cxr: slightly better aeration at left base.  apical pleural thickening.     





PFTs [facility] 2/2018: moderate restrictive defect with mild decrease in diffusion 

capacity.  


CTA chest [facility] 2/2018: suboptimal opacification of pulmonary arterial tree 

limiting evaluation.  curvilinear filling defect in segmental branch of RLL 

similar to 2015 cta - may reflect chronic PE.  No acute PE.  + aortic 

atherosclerosis.  prominent calcification of cors.  low lung volumes.  

peripheral/basilar reticular opacities, honeycombing and traction 

bronchiectasis within bilateral lower lobes.  scattered atelectasis/scarring.  

RML nodule.  mediastinal LAD, somewhat similar to 2015 CT.  


LE dopplers 2/2018:  B LE DVT's


Echo 2/2018: tds.  mild LVE.  nl lv fn.  EF 60-65%.  nl rv size/fn.  small 

pericardial effusion.  





Echo here 3/2018: tds. no pericardial effusion.  





echo 5/2016: suboptimal views.  EF 50-55%. Impaired relaxation. Nl RV (

suboptimal views). Nl valves. Mild ao dilation 3.8 cm with laminar 

atherosclerotic plaque. 








pharm stress 2016: No sx's.  No ischemic ekg changes. Small subtle 

inferolateral ischemia.  EF 57%.  








A/P





76 yo m hx of CAD with multiple stents (last cath was 1/2012 at [facility]: dontae to om1

, residual dz: 40%pLCX, 50%pLAD, 60%mLAD, 80%D1, 40%pRCA), htn, hld, chronic 

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

PE/DVT S/P IVC Filter(2003) on eliquis, gerd, gastroparesis, BPH, Prostate Ca, 

hypothyroid, Chronic Pain Syndrome, Anxiety, OA here with sob  





CAD with prior pci (last 2012)/Trop elevation/NSTEMI  


- persistent elevation with flat trend.  EKG without acute ischemic changes.  


- reviewed [facility] records.  initial trop not elevated.  had episode of SVT to 180'

s on 2/27, subsequent trops not drawn.  Current elevation in troponin has 

overall flat trend with nl CK.  Likely had troponin lead at that time and now 

with residual elevation.  ddx.  myopericarditis (crp elevated).  echo here tds.

  


- low suspicion for acute ACS here as mentioned, but added ASA for underlying 

CAD/demand ischemia (also on eliquis).  Will likely need ischemic evaluation 

for risk stratification once acute issues resolve.   


- con't statin.  





sob


- no signs of acute diastolic hf exacerbation.  resumed home po lasix. 


- ischemic evaluation as above.  


- infectious work up per pmd/ID. 





SVT


- Episode of SVT up to 180's on [facility] admit 2/27/2018.  Cr at the time had peak 

of 1.75.  CRP at the time 27.4.  Troponins prior to episode and at time of 

episode were negative, but no trops drawn after episode.  


- per patient was started on diltiazem and digoxin (pt has new meds with him 

here). prior toprol was not continued.  





Bilateral DVT


- recent CTA at [facility] with no evidence of acute PE.  has h/o factor V leiden.  con

't eliquis.

## 2018-03-09 RX ADMIN — RANOLAZINE SCH MG: 500 TABLET, FILM COATED, EXTENDED RELEASE ORAL at 21:28

## 2018-03-09 RX ADMIN — FLUTICASONE PROPIONATE SCH INH: 50 SPRAY, METERED NASAL at 21:28

## 2018-03-09 RX ADMIN — APIXABAN SCH MG: 5 TABLET, FILM COATED ORAL at 22:17

## 2018-03-09 RX ADMIN — METHYLPREDNISOLONE SODIUM SUCCINATE SCH MG: 125 INJECTION, POWDER, FOR SOLUTION INTRAMUSCULAR; INTRAVENOUS at 01:00

## 2018-03-09 RX ADMIN — METHYLPREDNISOLONE SODIUM SUCCINATE SCH MG: 40 INJECTION, POWDER, FOR SOLUTION INTRAMUSCULAR; INTRAVENOUS at 14:27

## 2018-03-09 RX ADMIN — DILTIAZEM HYDROCHLORIDE SCH MG: 30 TABLET, FILM COATED ORAL at 21:27

## 2018-03-09 RX ADMIN — IPRATROPIUM BROMIDE AND ALBUTEROL SULFATE SCH AMP: .5; 3 SOLUTION RESPIRATORY (INHALATION) at 17:22

## 2018-03-09 RX ADMIN — APIXABAN SCH MG: 5 TABLET, FILM COATED ORAL at 09:28

## 2018-03-09 RX ADMIN — METHYLPREDNISOLONE SODIUM SUCCINATE SCH MG: 125 INJECTION, POWDER, FOR SOLUTION INTRAMUSCULAR; INTRAVENOUS at 09:27

## 2018-03-09 RX ADMIN — DILTIAZEM HYDROCHLORIDE SCH MG: 30 TABLET, FILM COATED ORAL at 05:55

## 2018-03-09 RX ADMIN — MORPHINE SULFATE SCH MG: 30 TABLET, EXTENDED RELEASE ORAL at 05:57

## 2018-03-09 RX ADMIN — Medication PRN MG: at 22:16

## 2018-03-09 RX ADMIN — FUROSEMIDE SCH MG: 40 TABLET ORAL at 09:28

## 2018-03-09 RX ADMIN — IPRATROPIUM BROMIDE AND ALBUTEROL SULFATE SCH AMP: .5; 3 SOLUTION RESPIRATORY (INHALATION) at 20:40

## 2018-03-09 RX ADMIN — TAMSULOSIN HYDROCHLORIDE SCH MG: 0.4 CAPSULE ORAL at 09:28

## 2018-03-09 RX ADMIN — MORPHINE SULFATE SCH MG: 30 TABLET, EXTENDED RELEASE ORAL at 17:59

## 2018-03-09 RX ADMIN — Medication SCH MG: at 06:28

## 2018-03-09 RX ADMIN — IPRATROPIUM BROMIDE AND ALBUTEROL SULFATE SCH AMP: .5; 3 SOLUTION RESPIRATORY (INHALATION) at 07:35

## 2018-03-09 RX ADMIN — ASPIRIN 81 MG SCH MG: 81 TABLET ORAL at 09:27

## 2018-03-09 RX ADMIN — DOCUSATE SODIUM SCH MG: 100 CAPSULE, LIQUID FILLED ORAL at 07:57

## 2018-03-09 RX ADMIN — DOCUSATE SODIUM SCH MG: 100 CAPSULE, LIQUID FILLED ORAL at 21:27

## 2018-03-09 RX ADMIN — FLUTICASONE PROPIONATE SCH INH: 50 SPRAY, METERED NASAL at 09:28

## 2018-03-09 RX ADMIN — IPRATROPIUM BROMIDE AND ALBUTEROL SULFATE SCH AMP: .5; 3 SOLUTION RESPIRATORY (INHALATION) at 11:31

## 2018-03-09 RX ADMIN — DOCUSATE SODIUM SCH MG: 100 CAPSULE, LIQUID FILLED ORAL at 14:27

## 2018-03-09 RX ADMIN — ROSUVASTATIN CALCIUM SCH MG: 20 TABLET, FILM COATED ORAL at 21:27

## 2018-03-09 RX ADMIN — DOCUSATE SODIUM SCH MG: 100 CAPSULE, LIQUID FILLED ORAL at 05:54

## 2018-03-09 RX ADMIN — METHYLPREDNISOLONE SODIUM SUCCINATE SCH MG: 40 INJECTION, POWDER, FOR SOLUTION INTRAMUSCULAR; INTRAVENOUS at 21:27

## 2018-03-09 RX ADMIN — MORPHINE SULFATE SCH MG: 30 TABLET, EXTENDED RELEASE ORAL at 07:57

## 2018-03-09 RX ADMIN — RANOLAZINE SCH MG: 500 TABLET, FILM COATED, EXTENDED RELEASE ORAL at 09:28

## 2018-03-09 RX ADMIN — DIGOXIN SCH MG: 250 TABLET ORAL at 09:28

## 2018-03-09 RX ADMIN — Medication PRN MG: at 01:00

## 2018-03-09 RX ADMIN — AZITHROMYCIN DIHYDRATE SCH MLS/HR: 500 INJECTION, POWDER, LYOPHILIZED, FOR SOLUTION INTRAVENOUS at 09:28

## 2018-03-09 RX ADMIN — DILTIAZEM HYDROCHLORIDE SCH MG: 30 TABLET, FILM COATED ORAL at 14:28

## 2018-03-09 RX ADMIN — DILTIAZEM HYDROCHLORIDE SCH MG: 30 TABLET, FILM COATED ORAL at 07:57

## 2018-03-09 NOTE — PN
Progress Note, Physician


History of Present Illness: 





stable


no new issues








- Current Medication List


Current Medications: 


Active Medications





Albuterol/Ipratropium (Duoneb -)  1 amp NEB RQID [facility]


   Last Admin: 03/09/18 11:31 Dose:  1 amp


Apixaban (Eliquis -)  5 mg PO BID [facility]


   Last Admin: 03/09/18 09:28 Dose:  5 mg


Aspirin (Asa -)  81 mg PO DAILY [facility]


   Last Admin: 03/09/18 09:27 Dose:  81 mg


Digoxin (Lanoxin -)  0.25 mg PO DAILY [facility]


   Last Admin: 03/09/18 09:28 Dose:  0.25 mg


Diltiazem HCl (Cardizem -)  30 mg PO TID [facility]


   Last Admin: 03/09/18 07:57 Dose:  30 mg


Docusate Sodium (Colace -)  100 mg PO TID [facility]


   Last Admin: 03/09/18 07:57 Dose:  100 mg


Fluticasone Propionate (Flonase -)  1 spray NS BID [facility]


   Last Admin: 03/09/18 09:28 Dose:  1 inh


Furosemide (Lasix -)  40 mg PO DAILY [facility]


   Last Admin: 03/09/18 09:28 Dose:  40 mg


Azithromycin 500 mg/ Dextrose  250 mls @ 250 mls/hr IVPB DAILY [facility]


   Last Admin: 03/09/18 09:28 Dose:  250 mls/hr


Melatonin (Melatonin)  5 mg PO HS PRN


   PRN Reason: INSOMNIA


   Last Admin: 03/09/18 01:00 Dose:  5 mg


Methylprednisolone Sodium Succinate (Solu-Medrol -)  40 mg IVPUSH Q6H-IV [facility]


Morphine Sulfate (Ms Contin -)  30 mg PO BID@0600,1800 [facility]


   Last Admin: 03/09/18 07:57 Dose:  30 mg


Ranolazine (Ranexa -)  500 mg PO BID [facility]


   Last Admin: 03/09/18 09:28 Dose:  500 mg


Rosuvastatin Calcium (Crestor -)  20 mg PO HS [facility]


   Last Admin: 03/08/18 21:07 Dose:  20 mg


Senna (Senna -)  2 tab PO HS PRN


   PRN Reason: CONSTIPATION


   Last Admin: 03/08/18 21:10 Dose:  2 tab


Tamsulosin HCl (Flomax -)  0.4 mg PO DAILY@0830 [facility]


   Last Admin: 03/09/18 09:28 Dose:  0.4 mg


Thyroid ([place] Thyroid -)  60 mg PO DAILY@0700 [facility]


   Last Admin: 03/09/18 06:28 Dose:  60 mg











- Objective


Vital Signs: 


 Vital Signs











Temperature  98.1 F   03/09/18 10:00


 


Pulse Rate  74   03/09/18 10:00


 


Respiratory Rate  20   03/09/18 10:00


 


Blood Pressure  129/74   03/09/18 10:00


 


O2 Sat by Pulse Oximetry (%)  95   03/09/18 10:00











Constitutional: Yes: No Distress, Calm


Cardiovascular: Yes: Regular Rate and Rhythm


Respiratory: Yes: Regular, CTA Bilaterally


Gastrointestinal: Yes: Normal Bowel Sounds, Soft


Musculoskeletal: Yes: WNL


Extremities: Yes: WNL


Neurological: Yes: Alert, Oriented


Psychiatric: Yes: Alert, Oriented


Labs: 


 CBC, BMP





 03/07/18 10:50 





 03/07/18 10:50 





 INR, PTT











INR  1.49  (0.82-1.09)  H  03/06/18  06:30    














Assessment/Plan











dyspnea


ll infiltrate


prostate ca


htn


hld


gm positive bacteremia





repeat blood cx negative








plan


continue current mgmt


continue monitoring


rest as per primary

## 2018-03-09 NOTE — PN
Progress Note, Physician


History of Present Illness: 





PULMONARY








ALERT,OOB-CHAIR,C/O SOB





- Current Medication List


Current Medications: 


Active Medications





Albuterol/Ipratropium (Duoneb -)  1 amp NEB RQID [facility]


   Last Admin: 03/09/18 07:35 Dose:  1 amp


Apixaban (Eliquis -)  5 mg PO BID [facility]


   Last Admin: 03/09/18 09:28 Dose:  5 mg


Aspirin (Asa -)  81 mg PO DAILY [facility]


   Last Admin: 03/09/18 09:27 Dose:  81 mg


Digoxin (Lanoxin -)  0.25 mg PO DAILY [facility]


   Last Admin: 03/09/18 09:28 Dose:  0.25 mg


Diltiazem HCl (Cardizem -)  30 mg PO TID [facility]


   Last Admin: 03/09/18 07:57 Dose:  30 mg


Docusate Sodium (Colace -)  100 mg PO TID [facility]


   Last Admin: 03/09/18 07:57 Dose:  100 mg


Fluticasone Propionate (Flonase -)  1 spray NS BID [facility]


   Last Admin: 03/09/18 09:28 Dose:  1 inh


Furosemide (Lasix -)  40 mg PO DAILY [facility]


   Last Admin: 03/09/18 09:28 Dose:  40 mg


Azithromycin 500 mg/ Dextrose  250 mls @ 250 mls/hr IVPB DAILY [facility]


   Last Admin: 03/09/18 09:28 Dose:  250 mls/hr


Melatonin (Melatonin)  5 mg PO HS PRN


   PRN Reason: INSOMNIA


   Last Admin: 03/09/18 01:00 Dose:  5 mg


Methylprednisolone Sodium Succinate (Solu-Medrol -)  40 mg IVPUSH Q8H-IV [facility]


Morphine Sulfate (Ms Contin -)  30 mg PO BID@0600,1800 [facility]


   Last Admin: 03/09/18 07:57 Dose:  30 mg


Ranolazine (Ranexa -)  500 mg PO BID [facility]


   Last Admin: 03/09/18 09:28 Dose:  500 mg


Rosuvastatin Calcium (Crestor -)  20 mg PO HS [facility]


   Last Admin: 03/08/18 21:07 Dose:  20 mg


Senna (Senna -)  2 tab PO HS PRN


   PRN Reason: CONSTIPATION


   Last Admin: 03/08/18 21:10 Dose:  2 tab


Tamsulosin HCl (Flomax -)  0.4 mg PO DAILY@0830 [facility]


   Last Admin: 03/09/18 09:28 Dose:  0.4 mg


Thyroid ([place] Thyroid -)  60 mg PO DAILY@0700 [facility]


   Last Admin: 03/09/18 06:28 Dose:  60 mg











- Objective


Vital Signs: 


 Vital Signs











Temperature  98.1 F   03/09/18 10:00


 


Pulse Rate  74   03/09/18 10:00


 


Respiratory Rate  20   03/09/18 10:00


 


Blood Pressure  129/74   03/09/18 10:00


 


O2 Sat by Pulse Oximetry (%)  95   03/09/18 10:00











Constitutional: Yes: Well Nourished, Calm


Eyes: Yes: WNL


HENT: Yes: WNL


Neck: Yes: WNL


Cardiovascular: Yes: Regular Rate and Rhythm, S1, S2


Respiratory: Yes: Rales (BILATERAL CRACKLES)


Gastrointestinal: Yes: Normal Bowel Sounds, Soft


Extremities: Yes: WNL


Edema: Yes


Labs: 


 CBC, BMP








Assessment/Plan





IMP DYSPNEA


      COPD EXACERBATION


      CHF


      ? LLL INFILTRATE


      FACTOR V LEIDEN


      H/O DVT/PE


      ILD


      H/O CVA


      + TROPONINS


      PROSTATE CA


     HLD


     HTN





PLAN O2


      INCREASE STEROIDS


       ABX


       LASIX


       F/U CHEST X-RAYS


 


DR ARREOLA

## 2018-03-09 NOTE — PN
Progress Note (short form)





- Note


Progress Note: 





S: +cough and nasal congestion, less today. no cp palps dizzy; mild sob








 


 


 Current Medications











Generic Name Dose Route Start Last Admin





  Trade Name Vincenzo  PRN Reason Stop Dose Admin


 


Albuterol/Ipratropium  1 amp  03/05/18 20:00  03/09/18 11:31





  Duoneb -  NEB   1 amp





  RQID MARCELO   Administration


 


Apixaban  5 mg  03/05/18 22:00  03/09/18 09:28





  Eliquis -  PO   5 mg





  BID MARCELO   Administration


 


Aspirin  81 mg  03/05/18 19:00  03/09/18 09:27





  Asa -  PO   81 mg





  DAILY MARCELO   Administration


 


Digoxin  0.25 mg  03/06/18 10:00  03/09/18 09:28





  Lanoxin -  PO   0.25 mg





  DAILY MARCELO   Administration


 


Diltiazem HCl  30 mg  03/05/18 22:00  03/09/18 07:57





  Cardizem -  PO   30 mg





  TID MARCELO   Administration


 


Docusate Sodium  100 mg  03/06/18 22:00  03/09/18 07:57





  Colace -  PO   100 mg





  TID MARCELO   Administration


 


Fluticasone Propionate  1 spray  03/07/18 11:00  03/09/18 09:28





  Flonase -  NS   1 inh





  BID MARCELO   Administration


 


Furosemide  40 mg  03/06/18 10:00  03/09/18 09:28





  Lasix -  PO   40 mg





  DAILY MARCELO   Administration


 


Azithromycin 500 mg/ Dextrose  250 mls @ 250 mls/hr  03/07/18 12:00  03/09/18 09

:28





  IVPB   250 mls/hr





  DAILY MARCELO   Administration


 


Melatonin  5 mg  03/08/18 00:09  03/09/18 01:00





  Melatonin  PO   5 mg





  HS PRN   Administration





  INSOMNIA   


 


Methylprednisolone Sodium Succinate  40 mg  03/09/18 15:00  





  Solu-Medrol -  IVPUSH   





  Q6H-IV MARCELO   


 


Morphine Sulfate  30 mg  03/05/18 19:00  03/09/18 07:57





  Ms Contin -  PO   30 mg





  BID@0600,1800 MARCELO   Administration


 


Ranolazine  500 mg  03/05/18 22:00  03/09/18 09:28





  Ranexa -  PO   500 mg





  BID MARCELO   Administration


 


Rosuvastatin Calcium  20 mg  03/06/18 22:00  03/08/18 21:07





  Crestor -  PO   20 mg





  HS MARCELO   Administration


 


Senna  2 tab  03/06/18 20:11  03/08/18 21:10





  Senna -  PO   2 tab





  HS PRN   Administration





  CONSTIPATION   


 


Tamsulosin HCl  0.4 mg  03/09/18 08:30  03/09/18 09:28





  Flomax -  PO   0.4 mg





  DAILY@0830 MARCELO   Administration


 


Thyroid  60 mg  03/06/18 10:11  03/09/18 06:28





  [place] Thyroid -  PO   60 mg





  DAILY@0700 MARCELO   Administration








 Vital Signs











 Period  Temp  Pulse  Resp  BP Sys/Abel  Pulse Ox


 


 Last 24 Hr  97.5 F-98.2 F    20-20  116-129/49-74  95-95








nad, calm


jvd tds but does not appear elevated. 


rrr s1s2 no mrg


bibasilar dry rales. nl effort


aaox3


no le e/c/c


abd nt nd pos bs


no jaundice diaphoresis











 


 


 Laboratory Last Values











WBC  8.6 K/mm3 (4.0-10.0)  D 03/07/18  10:50    


 


RBC  4.11 M/mm3 (4.00-5.60)   03/07/18  10:50    


 


Hgb  13.0 GM/dL (11.7-16.9)   03/07/18  10:50    


 


Hct  38.6 % (35.4-49)   03/07/18  10:50    


 


MCV  94.0 fl (80-96)   03/07/18  10:50    


 


MCH  31.7 pg (25.7-33.7)   03/07/18  10:50    


 


MCHC  33.7 g/dl (32.0-35.9)   03/07/18  10:50    


 


RDW  14.2 % (11.9-15.9)   03/07/18  10:50    


 


Plt Count  201 K/MM3 (134-434)   03/07/18  10:50    


 


MPV  7.5 fl (7.5-11.1)   03/07/18  10:50    


 


Neutrophils %  66.8 % (42.8-82.8)   03/07/18  10:50    


 


Lymphocytes %  19.8 % (8-40)   03/07/18  10:50    


 


Monocytes %  12.0 % (3.8-10.2)  H  03/07/18  10:50    


 


Eosinophils %  1.0 % (0-4.5)   03/07/18  10:50    


 


Basophils %  0.4 % (0-2.0)   03/07/18  10:50    


 


PT with INR  16.80 SEC (9.98-11.88)  H  03/06/18  06:30    


 


INR  1.49  (0.82-1.09)  H  03/06/18  06:30    


 


VBG pH  7.37  (7.32-7.42)   03/05/18  11:34    


 


POC VBG pCO2  53.1 mmHg (38-52)  H  03/05/18  11:34    


 


POC VBG pO2  17.3 mmHg (28-48)  L*  03/05/18  11:34    


 


Mixed VBG HCO3  30.0 meq/L (19-25)  H  03/05/18  11:34    


 


Sodium  138 mmol/L (136-145)   03/07/18  10:50    


 


Potassium  3.6 mmol/L (3.5-5.1)   03/07/18  10:50    


 


Chloride  101 mmol/L ()   03/07/18  10:50    


 


Carbon Dioxide  27 mmol/L (21-32)   03/07/18  10:50    


 


Anion Gap  10  (8-16)   03/07/18  10:50    


 


BUN  14 mg/dL (7-18)   03/07/18  10:50    


 


Creatinine  1.3 mg/dL (0.7-1.3)   03/07/18  10:50    


 


Creat Clearance w eGFR  53.53  (>60)   03/07/18  10:50    


 


Random Glucose  114 mg/dL ()  H D 03/07/18  10:50    


 


Hemoglobin A1c %  6.2 % (4.8-6.0)  H D 03/06/18  06:30    


 


Lactic Acid  1.6 mmol/L (0.0-2.0)   03/05/18  11:34    


 


Calcium  8.3 mg/dL (8.5-10.1)  L  03/07/18  10:50    


 


Phosphorus  3.1 mg/dL (2.5-4.9)  D 03/06/18  06:30    


 


Magnesium  2.4 mg/dL (1.8-2.4)   03/07/18  10:50    


 


Total Bilirubin  0.6 mg/dL (0.2-1.0)  D 03/07/18  10:50    


 


AST  7 U/L (15-37)  L D 03/07/18  10:50    


 


ALT  17 U/L (12-78)   03/07/18  10:50    


 


Alkaline Phosphatase  61 U/L ()   03/07/18  10:50    


 


Creatine Kinase  30 IU/L ()  L  03/06/18  17:30    


 


Troponin I  0.58 ng/ml (0.00-0.05)  H  03/06/18  17:30    


 


B-Natriuretic Peptide  236.80 pg/ml (5-450)   03/05/18  11:34    


 


Total Protein  5.9 g/dl (6.4-8.2)  L  03/07/18  10:50    


 


Albumin  3.0 g/dl (3.4-5.0)  L  03/07/18  10:50    


 


Triglycerides  101 mg/dL ()  D 03/06/18  06:30    


 


Cholesterol  139 mg/dL ()   03/06/18  06:30    


 


Total LDL Cholesterol  80 mg/dL (5-100)  D 03/06/18  06:30    


 


HDL Cholesterol  45 mg/dL (40-60)   03/06/18  06:30    


 


Lipase  115 U/L ()   03/05/18  11:34    


 


TSH  4.70 uIU/ml (0.358-3.74)  H D 03/06/18  06:30    


 


Urine Color  Yellow   03/05/18  13:05    


 


Urine Appearance  Slcloudy   03/05/18  13:05    


 


Urine pH  7.0  (5.0-8.0)   03/05/18  13:05    


 


Ur Specific Gravity  1.013  (1.001-1.035)   03/05/18  13:05    


 


Urine Protein  Negative  (NEGATIVE)   03/05/18  13:05    


 


Urine Glucose (UA)  Negative  (NEGATIVE)   03/05/18  13:05    


 


Urine Ketones  Negative  (NEGATIVE)   03/05/18  13:05    


 


Urine Blood  Negative  (NEGATIVE)   03/05/18  13:05    


 


Urine Nitrite  Negative  (NEGATIVE)   03/05/18  13:05    


 


Urine Bilirubin  Negative  (NEGATIVE)   03/05/18  13:05    


 


Urine Urobilinogen  2.0 mg/dL (0.2-1.0)   03/05/18  13:05    


 


Ur Leukocyte Esterase  Negative  (NEGATIVE)   03/05/18  13:05    


 


Digoxin  1.2400 ng/ml (0.8-2.0)   03/06/18  06:30    











ecg: sr, non-specific t wave abnormalities - similar to priors.  no acute 

ischemic changes. 


2/27/18 ekg [facility]:  SVT, 181 bpm. subtle lateral ST abnormalities/STD  








cxr:  scarring vs. left early lobe infiltrate. 


3/6 cxr: slightly better aeration at left base.  apical pleural thickening.     





PFTs [facility] 2/2018: moderate restrictive defect with mild decrease in diffusion 

capacity.  


CTA chest [facility] 2/2018: suboptimal opacification of pulmonary arterial tree 

limiting evaluation.  curvilinear filling defect in segmental branch of RLL 

similar to 2015 cta - may reflect chronic PE.  No acute PE.  + aortic 

atherosclerosis.  prominent calcification of cors.  low lung volumes.  

peripheral/basilar reticular opacities, honeycombing and traction 

bronchiectasis within bilateral lower lobes.  scattered atelectasis/scarring.  

RML nodule.  mediastinal LAD, somewhat similar to 2015 CT.  


LE dopplers 2/2018:  B LE DVT's


Echo 2/2018: tds.  mild LVE.  nl lv fn.  EF 60-65%.  nl rv size/fn.  small 

pericardial effusion.  





Echo here 3/2018: tds. no pericardial effusion.  





echo 5/2016: suboptimal views.  EF 50-55%. Impaired relaxation. Nl RV (

suboptimal views). Nl valves. Mild ao dilation 3.8 cm with laminar 

atherosclerotic plaque. 








pharm stress 2016: No sx's.  No ischemic ekg changes. Small subtle 

inferolateral ischemia.  EF 57%.  








A/P





78 yo m hx of CAD with multiple stents (last cath was 1/2012 at [facility] [place]: dontae to om1

, residual dz: 40%pLCX, 50%pLAD, 60%mLAD, 80%D1, 40%pRCA), htn, hld, chronic 

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

PE/DVT S/P IVC Filter(2003) on eliquis, gerd, gastroparesis, BPH, Prostate Ca, 

hypothyroid, Chronic Pain Syndrome, Anxiety, OA here with sob  





CAD with prior pci (last 2012)/Trop elevation/NSTEMI  


- persistent elevation with flat trend.  EKG without acute ischemic changes.  


- reviewed [facility] records.  initial trop not elevated.  had episode of SVT to 180'

s on 2/27, subsequent trops not drawn.  Current elevation in troponin has 

overall flat trend with nl CK.  Likely had troponin lead at that time and now 

with residual elevation.  ddx.  myopericarditis (crp elevated).  echo here tds.

  


- low suspicion for acute ACS here as mentioned, but added ASA for underlying 

CAD/demand ischemia (also on eliquis).  Will likely need ischemic evaluation 

for risk stratification once acute issues resolve.   


- con't statin.  





sob


- no signs of acute diastolic hf exacerbation.  resumed home po lasix. 


- ischemic evaluation as above.  


- infectious work up per pmd/ID. 





SVT


- Episode of SVT up to 180's on [facility] admit 2/27/2018.  Cr at the time had peak 

of 1.75.  CRP at the time 27.4.  Troponins prior to episode and at time of 

episode were negative, but no trops drawn after episode.  


- per patient was started on diltiazem and digoxin (pt has new meds with him 

here). prior toprol was not continued.  





Bilateral DVT


- recent CTA at [facility] with no evidence of acute PE.  has h/o factor V leiden.  con

't eliquis.

## 2018-03-09 NOTE — PN
Physical Exam: 


SUBJECTIVE: Patient seen and examined. Pt reports no bowel movement as of yet. 

Instructed pt he needs to attempt ambulation. He feels weak, appears less sob. 








OBJECTIVE:





 Vital Signs











 Period  Temp  Pulse  Resp  BP Sys/Abel  Pulse Ox


 


 Last 24 Hr  97.5 F-98.2 F    20-20  116-129/49-66  95








PE


Neuro: alert, awake, cn 2-12intact


HEENT: oral cavity pink moist 


Pulm: R base crackles 


CV: s1 s2 rrr 


Abd: RLQ mild tenderness to palpation, soft, + bs


Ext: warm, no le edema





Active Medications











Generic Name Dose Route Start Last Admin





  Trade Name Freq  PRN Reason Stop Dose Admin


 


Albuterol/Ipratropium  1 amp  03/05/18 20:00  03/09/18 07:35





  Duoneb -  NEB   1 amp





  RQID MARCELO   Administration


 


Apixaban  5 mg  03/05/18 22:00  03/09/18 09:28





  Eliquis -  PO   5 mg





  BID MARCELO   Administration


 


Aspirin  81 mg  03/05/18 19:00  03/09/18 09:27





  Asa -  PO   81 mg





  DAILY MARCELO   Administration


 


Digoxin  0.25 mg  03/06/18 10:00  03/09/18 09:28





  Lanoxin -  PO   0.25 mg





  DAILY MARCELO   Administration


 


Diltiazem HCl  30 mg  03/05/18 22:00  03/09/18 07:57





  Cardizem -  PO   30 mg





  TID MARCELO   Administration


 


Docusate Sodium  100 mg  03/06/18 22:00  03/09/18 07:57





  Colace -  PO   100 mg





  TID MARCELO   Administration


 


Fluticasone Propionate  1 spray  03/07/18 11:00  03/09/18 09:28





  Flonase -  NS   1 inh





  BID MARCELO   Administration


 


Furosemide  40 mg  03/06/18 10:00  03/09/18 09:28





  Lasix -  PO   40 mg





  DAILY MARCELO   Administration


 


Azithromycin 500 mg/ Dextrose  250 mls @ 250 mls/hr  03/07/18 12:00  03/09/18 09

:28





  IVPB   250 mls/hr





  DAILY MARCELO   Administration


 


Melatonin  5 mg  03/08/18 00:09  03/09/18 01:00





  Melatonin  PO   5 mg





  HS PRN   Administration





  INSOMNIA   


 


Methylprednisolone Sodium Succinate  40 mg  03/08/18 18:00  03/09/18 09:27





  Solu-Medrol -  IVPUSH   40 mg





  Q8H-IV MARCELO   Administration


 


Morphine Sulfate  30 mg  03/05/18 19:00  03/09/18 07:57





  Ms Contin -  PO   30 mg





  BID@0600,1800 MARCELO   Administration


 


Ranolazine  500 mg  03/05/18 22:00  03/09/18 09:28





  Ranexa -  PO   500 mg





  BID MARCELO   Administration


 


Rosuvastatin Calcium  20 mg  03/06/18 22:00  03/08/18 21:07





  Crestor -  PO   20 mg





  HS MARCELO   Administration


 


Senna  2 tab  03/06/18 20:11  03/08/18 21:10





  Senna -  PO   2 tab





  HS PRN   Administration





  CONSTIPATION   


 


Tamsulosin HCl  0.4 mg  03/09/18 08:30  03/09/18 09:28





  Flomax -  PO   0.4 mg





  DAILY@0830 MARCELO   Administration


 


Thyroid  60 mg  03/06/18 10:11  03/09/18 06:28





  [place] Thyroid -  PO   60 mg





  DAILY@0700 MARCELO   Administration











Imaging:


CTA/CT chest:  Interstitial lung disease, Pulmonary nodules unchanged





Assessment: 77 year old male with a significant past medical history of CVA, 

CAD s/p stents x 2, factor V leiden, diverticulitis, DVT (on Elliquis 5mg BID), 

GERD, kidney stones, gastroparesis, BPH, Prostate Ca, hypothyroid, Chronic Pain 

Syndrome, Anxiety, OA here with sob  MI, HTN, CVA, and COPD (home oxygen 

dependent) admitted with worsening sob 





Plan: 





1. Acute COPD exacerbation/interstitial lung disease, bronchiectasis


- Maintain medrol 40mg q8; pt o2 stable, however sob with exertion 


- Continue empiric azithro (day 3)


- Albuterol nebs qid 


- ID/pulm seeing 





2. CAD s/p multiple stents 


- Started ASA with eliquis for demand ischemia 


- Statin 


- Ranexa 500mg BID 





3. hx of b/l DVT/factor V leiden deficiency 


- s/p IVC filter 2003


- Eliquis 5mg BID 


- Recent imaging at  negative for dvt 





4. Elevated trop


- Flat trend 


- In future will need ischemic eval per cardiology 





5. Diastolic CHF 


- Not in exacerbation 


- Lasix 40mg day





6. SVT


- Dx at [place] 


- Digoxin 0.25mg daily , cardizem 30mg TID 





7. Constipation, R flank pain- chronic, 


- CTAP neg for acute pathology, abd pain could be from constipation 


- Encourage ambulation 


- If no success will give lactulose 





8. DVT


- Eliquis 





9. BPH


- Flomax





10. Hypothyroid 


- [place] Thyroid 





11. PT 


- Pt wants walker 


- PT eval 





12. x1 positive blood cx 


- Repeat bc negative 





Visit type





- Emergency Visit


Emergency Visit: Yes


ED Registration Date: 03/05/18


Care time: The patient presented to the Emergency Department on the above date 

and was hospitalized for further evaluation of their emergent condition.





- New Patient


This patient is new to me today: No





- Critical Care


Critical Care patient: No

## 2018-03-10 RX ADMIN — MORPHINE SULFATE SCH MG: 30 TABLET, EXTENDED RELEASE ORAL at 17:12

## 2018-03-10 RX ADMIN — SENNOSIDES PRN TAB: 8.6 TABLET, FILM COATED ORAL at 21:13

## 2018-03-10 RX ADMIN — DILTIAZEM HYDROCHLORIDE SCH MG: 30 TABLET, FILM COATED ORAL at 14:39

## 2018-03-10 RX ADMIN — METHYLPREDNISOLONE SODIUM SUCCINATE SCH MG: 40 INJECTION, POWDER, FOR SOLUTION INTRAMUSCULAR; INTRAVENOUS at 08:59

## 2018-03-10 RX ADMIN — FLUTICASONE PROPIONATE SCH INH: 50 SPRAY, METERED NASAL at 21:15

## 2018-03-10 RX ADMIN — DOCUSATE SODIUM SCH MG: 100 CAPSULE, LIQUID FILLED ORAL at 21:13

## 2018-03-10 RX ADMIN — IPRATROPIUM BROMIDE AND ALBUTEROL SULFATE SCH AMP: .5; 3 SOLUTION RESPIRATORY (INHALATION) at 16:38

## 2018-03-10 RX ADMIN — TAMSULOSIN HYDROCHLORIDE SCH MG: 0.4 CAPSULE ORAL at 08:59

## 2018-03-10 RX ADMIN — APIXABAN SCH MG: 5 TABLET, FILM COATED ORAL at 10:52

## 2018-03-10 RX ADMIN — IPRATROPIUM BROMIDE AND ALBUTEROL SULFATE SCH AMP: .5; 3 SOLUTION RESPIRATORY (INHALATION) at 07:30

## 2018-03-10 RX ADMIN — MORPHINE SULFATE SCH MG: 30 TABLET, EXTENDED RELEASE ORAL at 06:38

## 2018-03-10 RX ADMIN — METHYLPREDNISOLONE SODIUM SUCCINATE SCH MG: 40 INJECTION, POWDER, FOR SOLUTION INTRAMUSCULAR; INTRAVENOUS at 03:06

## 2018-03-10 RX ADMIN — Medication PRN MG: at 21:18

## 2018-03-10 RX ADMIN — IPRATROPIUM BROMIDE AND ALBUTEROL SULFATE SCH AMP: .5; 3 SOLUTION RESPIRATORY (INHALATION) at 11:05

## 2018-03-10 RX ADMIN — DILTIAZEM HYDROCHLORIDE SCH MG: 30 TABLET, FILM COATED ORAL at 06:38

## 2018-03-10 RX ADMIN — ASPIRIN 81 MG SCH MG: 81 TABLET ORAL at 10:52

## 2018-03-10 RX ADMIN — DOCUSATE SODIUM SCH MG: 100 CAPSULE, LIQUID FILLED ORAL at 14:39

## 2018-03-10 RX ADMIN — DILTIAZEM HYDROCHLORIDE SCH MG: 30 TABLET, FILM COATED ORAL at 21:14

## 2018-03-10 RX ADMIN — RANOLAZINE SCH MG: 500 TABLET, FILM COATED, EXTENDED RELEASE ORAL at 21:15

## 2018-03-10 RX ADMIN — Medication SCH MG: at 06:38

## 2018-03-10 RX ADMIN — AZITHROMYCIN DIHYDRATE SCH MLS/HR: 500 INJECTION, POWDER, LYOPHILIZED, FOR SOLUTION INTRAVENOUS at 13:03

## 2018-03-10 RX ADMIN — FUROSEMIDE SCH MG: 40 TABLET ORAL at 10:53

## 2018-03-10 RX ADMIN — METHYLPREDNISOLONE SODIUM SUCCINATE SCH MG: 40 INJECTION, POWDER, FOR SOLUTION INTRAMUSCULAR; INTRAVENOUS at 17:15

## 2018-03-10 RX ADMIN — DIGOXIN SCH MG: 250 TABLET ORAL at 11:01

## 2018-03-10 RX ADMIN — DOCUSATE SODIUM SCH MG: 100 CAPSULE, LIQUID FILLED ORAL at 06:38

## 2018-03-10 RX ADMIN — RANOLAZINE SCH MG: 500 TABLET, FILM COATED, EXTENDED RELEASE ORAL at 10:54

## 2018-03-10 RX ADMIN — ROSUVASTATIN CALCIUM SCH: 20 TABLET, FILM COATED ORAL at 21:17

## 2018-03-10 RX ADMIN — APIXABAN SCH MG: 5 TABLET, FILM COATED ORAL at 21:15

## 2018-03-10 RX ADMIN — FLUTICASONE PROPIONATE SCH INH: 50 SPRAY, METERED NASAL at 10:52

## 2018-03-10 NOTE — PN
Progress Note, Physician


History of Present Illness: 





pulmonary





alert,feeling better,oob-chair,less dyspneic





- Current Medication List


Current Medications: 


Active Medications





Albuterol/Ipratropium (Duoneb -)  1 amp NEB RQID [facility]


   Last Admin: 03/10/18 11:05 Dose:  1 amp


Apixaban (Eliquis -)  5 mg PO BID [facility]


   Last Admin: 03/10/18 10:52 Dose:  5 mg


Aspirin (Asa -)  81 mg PO DAILY [facility]


   Last Admin: 03/10/18 10:52 Dose:  81 mg


Digoxin (Lanoxin -)  0.25 mg PO DAILY [facility]


   Last Admin: 03/10/18 11:01 Dose:  0.25 mg


Diltiazem HCl (Cardizem -)  30 mg PO TID [facility]


   Last Admin: 03/10/18 06:38 Dose:  30 mg


Docusate Sodium (Colace -)  100 mg PO TID [facility]


   Last Admin: 03/10/18 06:38 Dose:  100 mg


Fluticasone Propionate (Flonase -)  1 spray NS BID [facility]


   Last Admin: 03/10/18 10:52 Dose:  1 inh


Furosemide (Lasix -)  40 mg PO DAILY [facility]


   Last Admin: 03/10/18 10:53 Dose:  40 mg


Azithromycin 500 mg/ Dextrose  250 mls @ 250 mls/hr IVPB DAILY [facility]


   Last Admin: 03/09/18 09:28 Dose:  250 mls/hr


Melatonin (Melatonin)  5 mg PO HS PRN


   PRN Reason: INSOMNIA


   Last Admin: 03/09/18 22:16 Dose:  5 mg


Methylprednisolone Sodium Succinate (Solu-Medrol -)  40 mg IVPUSH Q6H-IV [facility]


   Last Admin: 03/10/18 08:59 Dose:  40 mg


Morphine Sulfate (Ms Contin -)  30 mg PO BID@0600,1800 [facility]


   Last Admin: 03/10/18 06:38 Dose:  30 mg


Non-Formulary Medication (Naloxegol Oxalate [Movantik])  25 mg PO DAILY [facility]


Ranolazine (Ranexa -)  500 mg PO BID [facility]


   Last Admin: 03/10/18 10:54 Dose:  500 mg


Rosuvastatin Calcium (Crestor -)  20 mg PO HS [facility]


   Last Admin: 03/09/18 21:27 Dose:  20 mg


Senna (Senna -)  2 tab PO HS PRN


   PRN Reason: CONSTIPATION


   Last Admin: 03/08/18 21:10 Dose:  2 tab


Tamsulosin HCl (Flomax -)  0.4 mg PO DAILY@0830 [facility]


   Last Admin: 03/10/18 08:59 Dose:  0.4 mg


Thyroid ([place] Thyroid -)  60 mg PO DAILY@0700 [facility]


   Last Admin: 03/10/18 06:38 Dose:  60 mg











- Objective


Vital Signs: 


 Vital Signs











Temperature  98 F   03/10/18 10:00


 


Pulse Rate  94 H  03/10/18 11:01


 


Respiratory Rate  18   03/10/18 10:00


 


Blood Pressure  119/60   03/10/18 10:00


 


O2 Sat by Pulse Oximetry (%)  95   03/09/18 21:00











Constitutional: Yes: Well Nourished, Calm


Eyes: Yes: WNL


HENT: Yes: WNL


Neck: Yes: WNL


Cardiovascular: Yes: Regular Rate and Rhythm, S1, S2


Respiratory: Yes: Rales (bilateral crackles1/3 up)


Gastrointestinal: Yes: Normal Bowel Sounds, Soft


Extremities: Yes: WNL


Edema: No


Labs: 


 CBC, BMP








Assessment/Plan





IMP DYSPNEA IMPROVING


      COPD EXACERBATION IMPROVING


      CHF


      ? LLL INFILTRATE


      FACTOR V LEIDEN


      H/O DVT/PE


      ILD


      H/O CVA


      + TROPONINS


      PROSTATE CA


     HLD


     HTN





PLAN O2


       STEROID TAPER


       ABX


       LASIX


       F/U CHEST X-RAYS


 


DR ARREOLA

## 2018-03-10 NOTE — PN
Progress Note, Physician


History of Present Illness: 





stable


no new issues


c/o of bowel movement not happening





- Current Medication List


Current Medications: 


Active Medications





Albuterol/Ipratropium (Duoneb -)  1 amp NEB RQID [facility]


   Last Admin: 03/10/18 11:05 Dose:  1 amp


Apixaban (Eliquis -)  5 mg PO BID [facility]


   Last Admin: 03/10/18 10:52 Dose:  5 mg


Aspirin (Asa -)  81 mg PO DAILY [facility]


   Last Admin: 03/10/18 10:52 Dose:  81 mg


Digoxin (Lanoxin -)  0.25 mg PO DAILY [facility]


   Last Admin: 03/10/18 11:01 Dose:  0.25 mg


Diltiazem HCl (Cardizem -)  30 mg PO TID [facility]


   Last Admin: 03/10/18 06:38 Dose:  30 mg


Docusate Sodium (Colace -)  100 mg PO TID [facility]


   Last Admin: 03/10/18 06:38 Dose:  100 mg


Fluticasone Propionate (Flonase -)  1 spray NS BID [facility]


   Last Admin: 03/10/18 10:52 Dose:  1 inh


Furosemide (Lasix -)  40 mg PO DAILY [facility]


   Last Admin: 03/10/18 10:53 Dose:  40 mg


Azithromycin 500 mg/ Dextrose  250 mls @ 250 mls/hr IVPB DAILY [facility]


   Last Admin: 03/09/18 09:28 Dose:  250 mls/hr


Melatonin (Melatonin)  5 mg PO HS PRN


   PRN Reason: INSOMNIA


   Last Admin: 03/09/18 22:16 Dose:  5 mg


Methylprednisolone Sodium Succinate (Solu-Medrol -)  40 mg IVPUSH Q6H-IV [facility]


   Last Admin: 03/10/18 08:59 Dose:  40 mg


Morphine Sulfate (Ms Contin -)  30 mg PO BID@0600,1800 [facility]


   Last Admin: 03/10/18 06:38 Dose:  30 mg


Non-Formulary Medication (Naloxegol Oxalate [Movantik])  25 mg PO DAILY [facility]


Ranolazine (Ranexa -)  500 mg PO BID [facility]


   Last Admin: 03/10/18 10:54 Dose:  500 mg


Rosuvastatin Calcium (Crestor -)  20 mg PO HS [facility]


   Last Admin: 03/09/18 21:27 Dose:  20 mg


Senna (Senna -)  2 tab PO HS PRN


   PRN Reason: CONSTIPATION


   Last Admin: 03/08/18 21:10 Dose:  2 tab


Tamsulosin HCl (Flomax -)  0.4 mg PO DAILY@0830 [facility]


   Last Admin: 03/10/18 08:59 Dose:  0.4 mg


Thyroid ([place] Thyroid -)  60 mg PO DAILY@0700 [facility]


   Last Admin: 03/10/18 06:38 Dose:  60 mg











- Objective


Vital Signs: 


 Vital Signs











Temperature  97.7 F   03/10/18 06:00


 


Pulse Rate  94 H  03/10/18 11:01


 


Respiratory Rate  18   03/10/18 06:00


 


Blood Pressure  114/61   03/10/18 06:00


 


O2 Sat by Pulse Oximetry (%)  95   03/09/18 21:00











Constitutional: Yes: No Distress, Calm


Cardiovascular: Yes: Regular Rate and Rhythm


Respiratory: Yes: Regular, CTA Bilaterally


Gastrointestinal: Yes: Normal Bowel Sounds, Soft


Musculoskeletal: Yes: WNL


Extremities: Yes: WNL


Neurological: Yes: Alert, Oriented


Psychiatric: Yes: Alert


Labs: 


 CBC, BMP





 03/07/18 10:50 





 03/07/18 10:50 





 INR, PTT











INR  1.49  (0.82-1.09)  H  03/06/18  06:30    














Assessment/Plan











dyspnea


ll infiltrate


prostate ca


htn


hld


gm positive bacteremia





repeat blood cx negative








plan


continue current mgmt


continue monitoring


rest as per primary

## 2018-03-11 LAB
ANION GAP SERPL CALC-SCNC: 8 MMOL/L (ref 8–16)
BASOPHILS # BLD: 0.1 % (ref 0–2)
BUN SERPL-MCNC: 22 MG/DL (ref 7–18)
CALCIUM SERPL-MCNC: 7.3 MG/DL (ref 8.5–10.1)
CHLORIDE SERPL-SCNC: 103 MMOL/L (ref 98–107)
CO2 SERPL-SCNC: 30 MMOL/L (ref 21–32)
CREAT SERPL-MCNC: 1.1 MG/DL (ref 0.7–1.3)
DEPRECATED RDW RBC AUTO: 14.3 % (ref 11.9–15.9)
EOSINOPHIL # BLD: 0 % (ref 0–4.5)
GLUCOSE SERPL-MCNC: 117 MG/DL (ref 74–106)
HCT VFR BLD CALC: 38.1 % (ref 35.4–49)
HGB BLD-MCNC: 12.9 GM/DL (ref 11.7–16.9)
LYMPHOCYTES # BLD: 4.6 % (ref 8–40)
MCH RBC QN AUTO: 31.9 PG (ref 25.7–33.7)
MCHC RBC AUTO-ENTMCNC: 33.8 G/DL (ref 32–35.9)
MCV RBC: 94.4 FL (ref 80–96)
MONOCYTES # BLD AUTO: 4.7 % (ref 3.8–10.2)
NEUTROPHILS # BLD: 90.6 % (ref 42.8–82.8)
PLATELET # BLD AUTO: 194 K/MM3 (ref 134–434)
PMV BLD: 7.9 FL (ref 7.5–11.1)
POTASSIUM SERPLBLD-SCNC: 3.5 MMOL/L (ref 3.5–5.1)
RBC # BLD AUTO: 4.04 M/MM3 (ref 4–5.6)
SODIUM SERPL-SCNC: 141 MMOL/L (ref 136–145)
WBC # BLD AUTO: 10 K/MM3 (ref 4–10)

## 2018-03-11 RX ADMIN — APIXABAN SCH MG: 5 TABLET, FILM COATED ORAL at 21:32

## 2018-03-11 RX ADMIN — ASPIRIN 81 MG SCH MG: 81 TABLET ORAL at 10:35

## 2018-03-11 RX ADMIN — METHYLPREDNISOLONE SODIUM SUCCINATE SCH MG: 40 INJECTION, POWDER, FOR SOLUTION INTRAMUSCULAR; INTRAVENOUS at 01:23

## 2018-03-11 RX ADMIN — DOCUSATE SODIUM SCH: 100 CAPSULE, LIQUID FILLED ORAL at 14:55

## 2018-03-11 RX ADMIN — FUROSEMIDE SCH MG: 40 TABLET ORAL at 10:35

## 2018-03-11 RX ADMIN — TAMSULOSIN HYDROCHLORIDE SCH MG: 0.4 CAPSULE ORAL at 08:59

## 2018-03-11 RX ADMIN — SENNOSIDES PRN TAB: 8.6 TABLET, FILM COATED ORAL at 21:32

## 2018-03-11 RX ADMIN — AZITHROMYCIN DIHYDRATE SCH MLS/HR: 500 INJECTION, POWDER, LYOPHILIZED, FOR SOLUTION INTRAVENOUS at 10:47

## 2018-03-11 RX ADMIN — RANOLAZINE SCH MG: 500 TABLET, FILM COATED, EXTENDED RELEASE ORAL at 10:35

## 2018-03-11 RX ADMIN — DOCUSATE SODIUM SCH MG: 100 CAPSULE, LIQUID FILLED ORAL at 07:05

## 2018-03-11 RX ADMIN — FLUTICASONE PROPIONATE SCH INH: 50 SPRAY, METERED NASAL at 10:35

## 2018-03-11 RX ADMIN — DILTIAZEM HYDROCHLORIDE SCH: 30 TABLET, FILM COATED ORAL at 14:55

## 2018-03-11 RX ADMIN — Medication SCH MG: at 14:51

## 2018-03-11 RX ADMIN — DIGOXIN SCH MG: 250 TABLET ORAL at 10:42

## 2018-03-11 RX ADMIN — ROSUVASTATIN CALCIUM SCH: 20 TABLET, FILM COATED ORAL at 21:33

## 2018-03-11 RX ADMIN — DOCUSATE SODIUM SCH MG: 100 CAPSULE, LIQUID FILLED ORAL at 21:32

## 2018-03-11 RX ADMIN — DILTIAZEM HYDROCHLORIDE SCH MG: 30 TABLET, FILM COATED ORAL at 21:32

## 2018-03-11 RX ADMIN — APIXABAN SCH MG: 5 TABLET, FILM COATED ORAL at 10:36

## 2018-03-11 RX ADMIN — Medication SCH MG: at 06:05

## 2018-03-11 RX ADMIN — DILTIAZEM HYDROCHLORIDE SCH MG: 30 TABLET, FILM COATED ORAL at 07:05

## 2018-03-11 RX ADMIN — DILTIAZEM HYDROCHLORIDE SCH MG: 30 TABLET, FILM COATED ORAL at 15:50

## 2018-03-11 RX ADMIN — DOCUSATE SODIUM SCH MG: 100 CAPSULE, LIQUID FILLED ORAL at 15:50

## 2018-03-11 RX ADMIN — RANOLAZINE SCH MG: 500 TABLET, FILM COATED, EXTENDED RELEASE ORAL at 21:32

## 2018-03-11 RX ADMIN — METHYLPREDNISOLONE SODIUM SUCCINATE SCH MG: 40 INJECTION, POWDER, FOR SOLUTION INTRAMUSCULAR; INTRAVENOUS at 18:29

## 2018-03-11 RX ADMIN — PANTOPRAZOLE SODIUM SCH MG: 40 TABLET, DELAYED RELEASE ORAL at 13:09

## 2018-03-11 RX ADMIN — MORPHINE SULFATE SCH MG: 30 TABLET, EXTENDED RELEASE ORAL at 07:05

## 2018-03-11 RX ADMIN — MORPHINE SULFATE SCH MG: 30 TABLET, EXTENDED RELEASE ORAL at 18:30

## 2018-03-11 RX ADMIN — FLUTICASONE PROPIONATE SCH INH: 50 SPRAY, METERED NASAL at 21:33

## 2018-03-11 RX ADMIN — METHYLPREDNISOLONE SODIUM SUCCINATE SCH MG: 40 INJECTION, POWDER, FOR SOLUTION INTRAMUSCULAR; INTRAVENOUS at 10:35

## 2018-03-11 NOTE — PN
Progress Note (short form)





- Note


Progress Note: 








CC: sob





S: still with sob today, not yet at baseline.   no cp palps dizzy 








 


 


 


 Current Medications





Apixaban (Eliquis -)  5 mg PO BID [facility]


   Last Admin: 03/11/18 10:36 Dose:  5 mg


Aspirin (Asa -)  81 mg PO DAILY [facility]


   Last Admin: 03/11/18 10:35 Dose:  81 mg


Digoxin (Lanoxin -)  0.25 mg PO DAILY [facility]


   Last Admin: 03/11/18 10:42 Dose:  0.25 mg


Diltiazem HCl (Cardizem -)  30 mg PO TID [facility]


   Last Admin: 03/11/18 15:50 Dose:  30 mg


Docusate Sodium (Colace -)  100 mg PO TID [facility]


   Last Admin: 03/11/18 15:50 Dose:  100 mg


Fluticasone Propionate (Flonase -)  1 spray NS BID [facility]


   Last Admin: 03/11/18 10:35 Dose:  1 inh


Furosemide (Lasix -)  40 mg PO DAILY [facility]


   Last Admin: 03/11/18 10:35 Dose:  40 mg


Hydromorphone HCl (Dilaudid -)  8 mg PO Q8H PRN


   PRN Reason: PAIN LEVEL 7 - 10


Melatonin (Melatonin)  5 mg PO HS PRN


   PRN Reason: INSOMNIA


   Last Admin: 03/10/18 21:18 Dose:  5 mg


Methylprednisolone Sodium Succinate (Solu-Medrol -)  40 mg IVPUSH Q8H-IV [facility]


   Last Admin: 03/11/18 10:35 Dose:  40 mg


Morphine Sulfate (Ms Contin -)  30 mg PO BID@0600,1800 [facility]


   Last Admin: 03/11/18 07:05 Dose:  30 mg


Non-Formulary Medication (Naloxegol Oxalate [Movantik])  25 mg PO DAILY [facility]


   Last Admin: 03/11/18 14:51 Dose:  25 mg


Pantoprazole Sodium (Protonix -)  40 mg PO DAILY [facility]


   Last Admin: 03/11/18 13:09 Dose:  40 mg


Potassium Chloride (K-Dur -)  40 meq PO ONCE ONE


   Stop: 03/11/18 17:22


Ranolazine (Ranexa -)  500 mg PO BID [facility]


   Last Admin: 03/11/18 10:35 Dose:  500 mg


Rosuvastatin Calcium (Crestor -)  20 mg PO HS [facility]


   Last Admin: 03/10/18 21:17 Dose:  Not Given


Senna (Senna -)  2 tab PO HS PRN


   PRN Reason: CONSTIPATION


   Last Admin: 03/10/18 21:13 Dose:  2 tab


Tamsulosin HCl (Flomax -)  0.4 mg PO DAILY@0830 [facility]


   Last Admin: 03/11/18 08:59 Dose:  0.4 mg


Thyroid ([place] Thyroid -)  60 mg PO DAILY@0700 [facility]


   Last Admin: 03/11/18 06:05 Dose:  60 mg











 Vital Signs - 24 hr











  03/10/18 03/10/18 03/10/18





  18:00 21:00 21:05


 


Temperature 97.6 F  98.2 F


 


Pulse Rate 91 H  80


 


Respiratory 20  20





Rate   


 


Blood Pressure 139/59  133/73


 


O2 Sat by Pulse  99 





Oximetry (%)   














  03/11/18 03/11/18 03/11/18





  01:00 06:00 10:05


 


Temperature 98.0 F 98.1 F 


 


Pulse Rate 80 65 


 


Respiratory 20 20 





Rate   


 


Blood Pressure 112/56 118/66 


 


O2 Sat by Pulse   98





Oximetry (%)   














  03/11/18 03/11/18 03/11/18





  10:30 10:42 14:47


 


Temperature 97.6 F  97.6 F


 


Pulse Rate 81 83 87


 


Respiratory 24  20





Rate   


 


Blood Pressure 114/66  133/63


 


O2 Sat by Pulse   





Oximetry (%)   














  03/11/18





  15:45


 


Temperature 


 


Pulse Rate 98 H


 


Respiratory 20





Rate 


 


Blood Pressure 150/75


 


O2 Sat by Pulse 





Oximetry (%) 














 Intake & Output











 03/09/18 03/10/18 03/11/18 03/12/18





 06:59 06:59 07:59 07:59


 


Intake Total    1190


 


Output Total    


 


Balance    1190











nad, calm


jvd tds but does not appear elevated. 


rrr s1s2 no mrg


bibasilar dry rales. nl effort


aaox3


1+ le edema, no c/c


abd nt nd pos bs


no jaundice diaphoresis











 CBC, BMP





 03/11/18 07:05 





 03/11/18 07:05 














ecg: sr, non-specific t wave abnormalities - similar to priors.  no acute 

ischemic changes. 


2/27/18 ekg [facility]:  SVT, 181 bpm. subtle lateral ST abnormalities/STD  





ct scan here, images and report reviewed. + ild, severe calcification of cors.  

see emr for detailed report





cxr:  scarring vs. left early lobe infiltrate. 


3/6 cxr: slightly better aeration at left base.  apical pleural thickening.     





PFTs [facility] 2/2018: moderate restrictive defect with mild decrease in diffusion 

capacity.  


CTA chest [facility] 2/2018: suboptimal opacification of pulmonary arterial tree 

limiting evaluation.  curvilinear filling defect in segmental branch of RLL 

similar to 2015 cta - may reflect chronic PE.  No acute PE.  + aortic 

atherosclerosis.  prominent calcification of cors.  low lung volumes.  

peripheral/basilar reticular opacities, honeycombing and traction 

bronchiectasis within bilateral lower lobes.  scattered atelectasis/scarring.  

RML nodule.  mediastinal LAD, somewhat similar to 2015 CT.  


LE dopplers 2/2018:  B LE DVT's


Echo 2/2018: tds.  mild LVE.  nl lv fn.  EF 60-65%.  nl rv size/fn.  small 

pericardial effusion.  





Echo here 3/2018: tds. no pericardial effusion.  





echo 5/2016: suboptimal views.  EF 50-55%. Impaired relaxation. Nl RV (

suboptimal views). Nl valves. Mild ao dilation 3.8 cm with laminar 

atherosclerotic plaque. 








pharm stress 2016: No sx's.  No ischemic ekg changes. Small subtle 

inferolateral ischemia.  EF 57%.  








A/P





76 yo m hx of CAD with multiple stents ([place] [facility] [place]

, residual dz: 40%pLCX, 50%pLAD, 60%mLAD, 80%D1, 40%pRCA), htn, hld, chronic 

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

PE/DVT S/P IVC Filter(2003) on eliquis, gerd, gastroparesis, BPH, Prostate Ca, 

hypothyroid, Chronic Pain Syndrome, Anxiety, OA here with sob  





CAD with prior pci (last 2012)/Trop elevation/NSTEMI  


- persistent elevation with flat trend.  EKG without acute ischemic changes.  


- reviewed [facility] records.  initial trop not elevated.  had episode of SVT to 180'

s on 2/27, subsequent trops not drawn.  Current elevation in troponin has 

overall flat trend with nl CK.  Likely had troponin lead at that time and now 

with residual elevation.  ddx.  myopericarditis (crp elevated).  echo here tds.

  


- low suspicion for acute ACS here as mentioned, but added ASA for underlying 

CAD/demand ischemia (also on eliquis).  Will likely need ischemic evaluation 

for risk stratification once acute issues resolve.   


- con't statin.  





sob


- no signs of acute diastolic hf exacerbation.  resumed home po lasix.  3/11 LE 

edema appears slightly worsened, will monitor weight to assess whether dose 

needs to be uptitrated. 


- ischemic evaluation as above.  


- infectious work up per pmd/ID. 





SVT


- Episode of SVT up to 180's on [facility] admit 2/27/2018.  Cr at the time had peak 

of 1.75.  CRP at the time 27.4.  Troponins prior to episode and at time of 

episode were negative, but no trops drawn after episode.  


- per patient was started on diltiazem and digoxin (pt has new meds with him 

here). prior toprol was not continued.  


- 3/11: stable HR,con't current meds, will repeat dig level. 





Bilateral DVT


- recent CTA at [facility] with no evidence of acute PE.  has h/o factor V leiden.  con

't eliquis.

## 2018-03-11 NOTE — PN
Progress Note, Physician


History of Present Illness: 





patient stable


no issues


breathing well





- Current Medication List


Current Medications: 


Active Medications





Apixaban (Eliquis -)  5 mg PO BID [facility]


   Last Admin: 03/11/18 10:36 Dose:  5 mg


Aspirin (Asa -)  81 mg PO DAILY [facility]


   Last Admin: 03/11/18 10:35 Dose:  81 mg


Digoxin (Lanoxin -)  0.25 mg PO DAILY [facility]


   Last Admin: 03/11/18 10:42 Dose:  0.25 mg


Diltiazem HCl (Cardizem -)  30 mg PO TID [facility]


   Last Admin: 03/11/18 07:05 Dose:  30 mg


Docusate Sodium (Colace -)  100 mg PO TID [facility]


   Last Admin: 03/11/18 07:05 Dose:  100 mg


Fluticasone Propionate (Flonase -)  1 spray NS BID [facility]


   Last Admin: 03/10/18 21:15 Dose:  1 inh


Furosemide (Lasix -)  40 mg PO DAILY [facility]


   Last Admin: 03/11/18 10:35 Dose:  40 mg


Hydromorphone HCl (Dilaudid -)  8 mg PO Q8H PRN


   PRN Reason: PAIN LEVEL 7 - 10


Melatonin (Melatonin)  5 mg PO HS PRN


   PRN Reason: INSOMNIA


   Last Admin: 03/10/18 21:18 Dose:  5 mg


Methylprednisolone Sodium Succinate (Solu-Medrol -)  40 mg IVPUSH Q8H-IV [facility]


   Last Admin: 03/11/18 10:35 Dose:  40 mg


Morphine Sulfate (Ms Contin -)  30 mg PO BID@0600,1800 [facility]


   Last Admin: 03/11/18 07:05 Dose:  30 mg


Non-Formulary Medication (Naloxegol Oxalate [Movantik])  25 mg PO DAILY [facility]


   Last Admin: 03/11/18 14:51 Dose:  25 mg


Pantoprazole Sodium (Protonix -)  40 mg PO DAILY [facility]


   Last Admin: 03/11/18 13:09 Dose:  40 mg


Ranolazine (Ranexa -)  500 mg PO BID [facility]


   Last Admin: 03/11/18 10:35 Dose:  500 mg


Rosuvastatin Calcium (Crestor -)  20 mg PO HS [facility]


   Last Admin: 03/10/18 21:17 Dose:  Not Given


Senna (Senna -)  2 tab PO HS PRN


   PRN Reason: CONSTIPATION


   Last Admin: 03/10/18 21:13 Dose:  2 tab


Tamsulosin HCl (Flomax -)  0.4 mg PO DAILY@0830 [facility]


   Last Admin: 03/11/18 08:59 Dose:  0.4 mg


Thyroid ([place] Thyroid -)  60 mg PO DAILY@0700 [facility]


   Last Admin: 03/11/18 06:05 Dose:  60 mg











- Objective


Vital Signs: 


 Vital Signs











Temperature  97.6 F   03/11/18 14:47


 


Pulse Rate  87   03/11/18 14:47


 


Respiratory Rate  20   03/11/18 14:47


 


Blood Pressure  133/63   03/11/18 14:47


 


O2 Sat by Pulse Oximetry (%)  98   03/11/18 10:05











Constitutional: Yes: No Distress, Calm


Cardiovascular: Yes: Regular Rate and Rhythm


Respiratory: Yes: Regular, CTA Bilaterally


Gastrointestinal: Yes: Normal Bowel Sounds, Soft


Musculoskeletal: Yes: WNL


Extremities: Yes: WNL


Neurological: Yes: Alert, Oriented


Psychiatric: Yes: Alert, Oriented


Labs: 


 CBC, BMP





 03/11/18 07:05 





 03/11/18 07:05 





 INR, PTT











INR  1.49  (0.82-1.09)  H  03/06/18  06:30    














Assessment/Plan











dyspnea


ll infiltrate


prostate ca


htn


hld


gm positive bacteremia





repeat blood cx negative








plan


continue current mgmt


continue monitoring'


rest as per primary team


stopped zithro

## 2018-03-11 NOTE — PN
Progress Note, Physician


Chief Complaint: 





comfortable c/o abd pain asking to resume home meds


History of Present Illness: 


77 year old male with a significant past medical history of CVA, CAD s/p stents 

x 2, factor V leiden, diverticulitis, DVT (on Elliquis 5mg BID), GERD, kidney 

stones, MI, hypertension and COPD (home oxygen dependent)   He presents to the 

ED today with complaints of shortness of breath x 1 week with cough of white 

sputum with a sore throat. Mild elevation of Trop I evaluated by cardiology








- Current Medication List


Current Medications: 


Active Medications





Apixaban (Eliquis -)  5 mg PO BID [facility]


   Last Admin: 03/11/18 10:36 Dose:  5 mg


Aspirin (Asa -)  81 mg PO DAILY [facility]


   Last Admin: 03/11/18 10:35 Dose:  81 mg


Digoxin (Lanoxin -)  0.25 mg PO DAILY [facility]


   Last Admin: 03/11/18 10:42 Dose:  0.25 mg


Diltiazem HCl (Cardizem -)  30 mg PO TID [facility]


   Last Admin: 03/11/18 07:05 Dose:  30 mg


Docusate Sodium (Colace -)  100 mg PO TID [facility]


   Last Admin: 03/11/18 07:05 Dose:  100 mg


Fluticasone Propionate (Flonase -)  1 spray NS BID [facility]


   Last Admin: 03/10/18 21:15 Dose:  1 inh


Furosemide (Lasix -)  40 mg PO DAILY [facility]


   Last Admin: 03/11/18 10:35 Dose:  40 mg


Azithromycin 500 mg/ Dextrose  250 mls @ 250 mls/hr IVPB DAILY [facility]


   Last Admin: 03/11/18 10:47 Dose:  250 mls/hr


Melatonin (Melatonin)  5 mg PO HS PRN


   PRN Reason: INSOMNIA


   Last Admin: 03/10/18 21:18 Dose:  5 mg


Methylprednisolone Sodium Succinate (Solu-Medrol -)  40 mg IVPUSH Q8H-IV [facility]


   Last Admin: 03/11/18 10:35 Dose:  40 mg


Morphine Sulfate (Ms Contin -)  30 mg PO BID@0600,1800 [facility]


   Last Admin: 03/11/18 07:05 Dose:  30 mg


Non-Formulary Medication (Naloxegol Oxalate [Movantik])  25 mg PO DAILY [facility]


Ranolazine (Ranexa -)  500 mg PO BID [facility]


   Last Admin: 03/11/18 10:35 Dose:  500 mg


Rosuvastatin Calcium (Crestor -)  20 mg PO HS [facility]


   Last Admin: 03/10/18 21:17 Dose:  Not Given


Senna (Senna -)  2 tab PO HS PRN


   PRN Reason: CONSTIPATION


   Last Admin: 03/10/18 21:13 Dose:  2 tab


Tamsulosin HCl (Flomax -)  0.4 mg PO DAILY@0830 [facility]


   Last Admin: 03/11/18 08:59 Dose:  0.4 mg


Thyroid ([place] Thyroid -)  60 mg PO DAILY@0700 [facility]


   Last Admin: 03/11/18 06:05 Dose:  60 mg











- Objective


Vital Signs: 


 Vital Signs











Temperature  97.6 F   03/11/18 10:30


 


Pulse Rate  83   03/11/18 10:42


 


Respiratory Rate  24   03/11/18 10:30


 


Blood Pressure  114/66   03/11/18 10:30


 


O2 Sat by Pulse Oximetry (%)  99   03/10/18 21:00











Constitutional: Yes: No Distress, Calm, Anxious


HENT: Yes: Atraumatic, Normocephalic, Nasal Congestion.  No: Epistaxis


Neck: Yes: Supple, Trachea Midline.  No: Lymphadenopathy


Cardiovascular: Yes: Regular Rate and Rhythm, S1, S2.  No: Bruit, JVD, Gallop


Respiratory: Yes: Regular, CTA Bilaterally


Gastrointestinal: Yes: Normal Bowel Sounds, Tenderness


Musculoskeletal: Yes: Back Pain.  No: Joint Swelling, Muscle Pain


Extremities: No: Calf Tenderness


Edema: Yes


Edema: LLE: Trace, RLE: Trace


Peripheral Pulses WNL: Yes


Peripheral Pulses: Left Doralis Pedis: 1+, Right Dorsalis Pedis: 1+


Neurological: Yes: Alert, Oriented.  No: Aphasia


...Motor Strength: WNL, LUE, LLE, RUE, RLE


Labs: 


 CBC, BMP





 03/11/18 07:05 





 03/11/18 07:05 





 INR, PTT











INR  1.49  (0.82-1.09)  H  03/06/18  06:30    














Problem List





- Problems


(1) Elevated troponin I level


Assessment/Plan: 


H/O CAD S/P PCI no chest papain christopher cute St t changes last NST -ve in 2016  

normal EF evaluated by cardiology further ischemic W/U as out patient


Code(s): R74.8 - ABNORMAL LEVELS OF OTHER SERUM ENZYMES   





(2) SOB (shortness of breath)


Assessment/Plan: 


Most likely COPd exacerbaytyion in the setting of Bronchitis on Z pack


Code(s): R06.02 - SHORTNESS OF BREATH   





(3) COPD (chronic obstructive pulmonary disease)


Assessment/Plan: 


Stable cont all Home Meds


Code(s): J44.9 - CHRONIC OBSTRUCTIVE PULMONARY DISEASE, UNSPECIFIED   





(4) Factor V deficiency


Assessment/Plan: 


On Apaxiban  H/O DVTs  s/p IVC Filter


Code(s): D68.2 - HEREDITARY DEFICIENCY OF OTHER CLOTTING FACTORS   





(5) SVT (supraventricular tachycardia)


Assessment/Plan: 


Cont Digoxine


Code(s): I47.1 - SUPRAVENTRICULAR TACHYCARDIA   





(6) Constipation


Assessment/Plan: 


last BM 6 days ago  no clinical sign of obstruction cont  Home Laxative


Code(s): K59.00 - CONSTIPATION, UNSPECIFIED   





(7) HTN (hypertension)


Assessment/Plan: 


Well controlled Cont Home Meds


Code(s): I10 - ESSENTIAL (PRIMARY) HYPERTENSION   


Qualifiers: 


   Hypertension type: essential hypertension   Qualified Code(s): I10 - 

Essential (primary) hypertension   





(8) Hypothyroid


Assessment/Plan: 


Cont armur thyroid


Code(s): E03.9 - HYPOTHYROIDISM, UNSPECIFIED   


Qualifiers: 


   Hypothyroidism type: due to acquired atrophy of thyroid   Qualified Code(s): 

E03.4 - Atrophy of thyroid (acquired)

## 2018-03-11 NOTE — PN
Progress Note, Physician


History of Present Illness: 





PULMONARY





ALERT,NAD ,DYSPNEA IMPROVING





- Current Medication List


Current Medications: 


Active Medications





Apixaban (Eliquis -)  5 mg PO BID [facility]


   Last Admin: 03/11/18 10:36 Dose:  5 mg


Aspirin (Asa -)  81 mg PO DAILY [facility]


   Last Admin: 03/11/18 10:35 Dose:  81 mg


Digoxin (Lanoxin -)  0.25 mg PO DAILY [facility]


   Last Admin: 03/11/18 10:42 Dose:  0.25 mg


Diltiazem HCl (Cardizem -)  30 mg PO TID [facility]


   Last Admin: 03/11/18 07:05 Dose:  30 mg


Docusate Sodium (Colace -)  100 mg PO TID [facility]


   Last Admin: 03/11/18 07:05 Dose:  100 mg


Fluticasone Propionate (Flonase -)  1 spray NS BID [facility]


   Last Admin: 03/10/18 21:15 Dose:  1 inh


Furosemide (Lasix -)  40 mg PO DAILY [facility]


   Last Admin: 03/11/18 10:35 Dose:  40 mg


Hydromorphone HCl (Dilaudid -)  8 mg PO Q8H PRN


   PRN Reason: PAIN LEVEL 7 - 10


Azithromycin 500 mg/ Dextrose  250 mls @ 250 mls/hr IVPB DAILY [facility]


   Last Admin: 03/11/18 10:47 Dose:  250 mls/hr


Melatonin (Melatonin)  5 mg PO HS PRN


   PRN Reason: INSOMNIA


   Last Admin: 03/10/18 21:18 Dose:  5 mg


Methylprednisolone Sodium Succinate (Solu-Medrol -)  40 mg IVPUSH Q8H-IV [facility]


   Last Admin: 03/11/18 10:35 Dose:  40 mg


Morphine Sulfate (Ms Contin -)  30 mg PO BID@0600,1800 [facility]


   Last Admin: 03/11/18 07:05 Dose:  30 mg


Non-Formulary Medication (Naloxegol Oxalate [Movantik])  25 mg PO DAILY [facility]


   Last Admin: 03/11/18 13:09 Dose:  25 mg


Pantoprazole Sodium (Protonix -)  40 mg PO DAILY [facility]


   Last Admin: 03/11/18 13:09 Dose:  40 mg


Ranolazine (Ranexa -)  500 mg PO BID [facility]


   Last Admin: 03/11/18 10:35 Dose:  500 mg


Rosuvastatin Calcium (Crestor -)  20 mg PO HS [facility]


   Last Admin: 03/10/18 21:17 Dose:  Not Given


Senna (Senna -)  2 tab PO HS PRN


   PRN Reason: CONSTIPATION


   Last Admin: 03/10/18 21:13 Dose:  2 tab


Tamsulosin HCl (Flomax -)  0.4 mg PO DAILY@0830 [facility]


   Last Admin: 03/11/18 08:59 Dose:  0.4 mg


Thyroid ([place] Thyroid -)  60 mg PO DAILY@0700 [facility]


   Last Admin: 03/11/18 06:05 Dose:  60 mg











- Objective


Vital Signs: 


 Vital Signs











Temperature  97.6 F   03/11/18 10:30


 


Pulse Rate  83   03/11/18 10:42


 


Respiratory Rate  24   03/11/18 10:30


 


Blood Pressure  114/66   03/11/18 10:30


 


O2 Sat by Pulse Oximetry (%)  98   03/11/18 10:05











Constitutional: Yes: Well Nourished, Calm


Eyes: Yes: WNL, Occular Prosthesis


Neck: Yes: WNL


Cardiovascular: Yes: Regular Rate and Rhythm, S1, S2


Respiratory: Yes: Rales (BIBASILAR CRACKLES)


Gastrointestinal: Yes: Normal Bowel Sounds, Soft


Extremities: Yes: WNL


Edema: No


Labs: 


 CBC, BMP





 03/11/18 07:05 





 03/11/18 07:05 





 INR, PTT











INR  1.49  (0.82-1.09)  H  03/06/18  06:30    














Assessment/Plan





IMP DYSPNEA IMPROVING


      COPD EXACERBATION IMPROVING


      CHF


      ? LLL INFILTRATE


      FACTOR V LEIDEN


      H/O DVT/PE


      ILD


      H/O CVA


      + TROPONINS


      PROSTATE CA


     HLD


     HTN





PLAN O2


       STEROID TAPER


       ABX AS PER ID


       LASIX


       F/U CHEST X-RAYS


 


DR ARREOLA

## 2018-03-12 VITALS — SYSTOLIC BLOOD PRESSURE: 126 MMHG | DIASTOLIC BLOOD PRESSURE: 72 MMHG | HEART RATE: 100 BPM

## 2018-03-12 VITALS — TEMPERATURE: 97.9 F

## 2018-03-12 LAB
ANION GAP SERPL CALC-SCNC: 12 MMOL/L (ref 8–16)
BUN SERPL-MCNC: 21 MG/DL (ref 7–18)
CALCIUM SERPL-MCNC: 7.8 MG/DL (ref 8.5–10.1)
CHLORIDE SERPL-SCNC: 103 MMOL/L (ref 98–107)
CO2 SERPL-SCNC: 25 MMOL/L (ref 21–32)
CREAT SERPL-MCNC: 1 MG/DL (ref 0.7–1.3)
GLUCOSE SERPL-MCNC: 130 MG/DL (ref 74–106)
POTASSIUM SERPLBLD-SCNC: 3.4 MMOL/L (ref 3.5–5.1)
SODIUM SERPL-SCNC: 140 MMOL/L (ref 136–145)

## 2018-03-12 RX ADMIN — DIGOXIN SCH MG: 250 TABLET ORAL at 09:36

## 2018-03-12 RX ADMIN — TAMSULOSIN HYDROCHLORIDE SCH MG: 0.4 CAPSULE ORAL at 08:22

## 2018-03-12 RX ADMIN — MORPHINE SULFATE SCH: 30 TABLET, EXTENDED RELEASE ORAL at 07:04

## 2018-03-12 RX ADMIN — METHYLPREDNISOLONE SODIUM SUCCINATE SCH MG: 40 INJECTION, POWDER, FOR SOLUTION INTRAMUSCULAR; INTRAVENOUS at 02:10

## 2018-03-12 RX ADMIN — DOCUSATE SODIUM SCH MG: 100 CAPSULE, LIQUID FILLED ORAL at 06:20

## 2018-03-12 RX ADMIN — FLUTICASONE PROPIONATE SCH INH: 50 SPRAY, METERED NASAL at 09:34

## 2018-03-12 RX ADMIN — METHYLPREDNISOLONE SODIUM SUCCINATE SCH MG: 40 INJECTION, POWDER, FOR SOLUTION INTRAMUSCULAR; INTRAVENOUS at 09:34

## 2018-03-12 RX ADMIN — APIXABAN SCH MG: 5 TABLET, FILM COATED ORAL at 09:35

## 2018-03-12 RX ADMIN — FUROSEMIDE SCH MG: 40 TABLET ORAL at 09:34

## 2018-03-12 RX ADMIN — Medication SCH MG: at 05:01

## 2018-03-12 RX ADMIN — MORPHINE SULFATE SCH MG: 30 TABLET, EXTENDED RELEASE ORAL at 06:21

## 2018-03-12 RX ADMIN — DOCUSATE SODIUM SCH MG: 100 CAPSULE, LIQUID FILLED ORAL at 13:17

## 2018-03-12 RX ADMIN — DILTIAZEM HYDROCHLORIDE SCH MG: 30 TABLET, FILM COATED ORAL at 06:21

## 2018-03-12 RX ADMIN — RANOLAZINE SCH MG: 500 TABLET, FILM COATED, EXTENDED RELEASE ORAL at 09:33

## 2018-03-12 RX ADMIN — Medication SCH MG: at 06:20

## 2018-03-12 RX ADMIN — DILTIAZEM HYDROCHLORIDE SCH MG: 30 TABLET, FILM COATED ORAL at 13:17

## 2018-03-12 RX ADMIN — PANTOPRAZOLE SODIUM SCH MG: 40 TABLET, DELAYED RELEASE ORAL at 09:33

## 2018-03-12 RX ADMIN — Medication SCH MG: at 09:34

## 2018-03-12 RX ADMIN — ASPIRIN 81 MG SCH MG: 81 TABLET ORAL at 09:34

## 2018-03-12 NOTE — PN
Progress Note (short form)





- Note


Progress Note: 


Sitting at the edge of the bed in NAD on RA. 


Some residual dry cough. 


No CP. 





CXR: No gross change / new finding 








 Intake & Output











 03/09/18 03/10/18 03/11/18 03/12/18





 22:59 22:59 23:59 23:59


 


Intake Total    325


 


Output Total    1050


 


Balance    -725


 


Weight    219 lb 0.3 oz











 Last Vital Signs











Temp Pulse Resp BP Pulse Ox


 


 98.0 F   83   18   132/73   99 


 


 03/12/18 08:58  03/12/18 09:36  03/12/18 09:00  03/12/18 08:58  03/12/18 09:00











Active Medications





Apixaban (Eliquis -)  5 mg PO BID [facility]


   Last Admin: 03/12/18 09:35 Dose:  5 mg


Aspirin (Asa -)  81 mg PO DAILY [facility]


   Last Admin: 03/12/18 09:34 Dose:  81 mg


Digoxin (Lanoxin -)  0.25 mg PO DAILY [facility]


   Last Admin: 03/12/18 09:36 Dose:  0.25 mg


Diltiazem HCl (Cardizem -)  30 mg PO TID [facility]


   Last Admin: 03/12/18 13:17 Dose:  30 mg


Docusate Sodium (Colace -)  100 mg PO TID [facility]


   Last Admin: 03/12/18 13:17 Dose:  100 mg


Fluticasone Propionate (Flonase -)  1 spray NS BID [facility]


   Last Admin: 03/12/18 09:34 Dose:  1 inh


Furosemide (Lasix -)  40 mg PO DAILY [facility]


   Last Admin: 03/12/18 09:34 Dose:  40 mg


Hydromorphone HCl (Dilaudid -)  8 mg PO Q8H PRN


   PRN Reason: PAIN LEVEL 7 - 10


   Last Admin: 03/12/18 11:42 Dose:  8 mg


Melatonin (Melatonin)  5 mg PO HS PRN


   PRN Reason: INSOMNIA


   Last Admin: 03/10/18 21:18 Dose:  5 mg


Morphine Sulfate ([place] Contin -)  30 mg PO BID@0600,1800 [facility]


   Last Admin: 03/12/18 07:04 Dose:  Not Given


Non-Formulary Medication (Naloxegol Oxalate [Movantik])  25 mg PO DAILY [facility]


   Last Admin: 03/12/18 09:34 Dose:  25 mg


Pantoprazole Sodium (Protonix -)  40 mg PO DAILY [facility]


   Last Admin: 03/12/18 09:33 Dose:  40 mg


Prednisone (Deltasone -)  40 mg PO BID [facility]


   Last Admin: 03/12/18 11:20 Dose:  Not Given


Ranolazine (Ranexa -)  500 mg PO BID [facility]


   Last Admin: 03/12/18 09:33 Dose:  500 mg


Senna (Senna -)  2 tab PO HS PRN


   PRN Reason: CONSTIPATION


   Last Admin: 03/11/18 21:32 Dose:  2 tab


Tamsulosin HCl (Flomax -)  0.4 mg PO DAILY@0830 [facility]


   Last Admin: 03/12/18 08:22 Dose:  0.4 mg


Thyroid ([place] Thyroid -)  60 mg PO DAILY@0700 [facility]


   Last Admin: 03/12/18 06:20 Dose:  60 mg





Constitutional: Yes: NAD 


Eyes: Yes: WNL, Occular Prosthesis


Neck: Yes: WNL


Cardiovascular: Yes: Regular Rate and Rhythm, S1, S2


Respiratory: Yes: Bibasilar Rales / crackles 


Gastrointestinal: Yes: Normal Bowel Sounds, Soft


Extremities: Yes: WNL


Edema: No


Labs: 


 


 Laboratory Results - last 24 hr











  03/12/18





  07:00


 


Sodium  140


 


Potassium  3.4 L


 


Chloride  103


 


Carbon Dioxide  25


 


Anion Gap  12


 


BUN  21 H


 


Creatinine  1.0


 


Random Glucose  130 H


 


Calcium  7.8 L


 


Digoxin  0.9463














Assessment/Plan


IMP DYSPNEA IMPROVING


      COPD EXACERBATION IMPROVING


      CHF


      ? LLL INFILTRATE


      FACTOR V LEIDEN


      H/O DVT/PE


      ILD


      H/O CVA


      + TROPONINS


      PROSTATE CA


     HLD


     HTN





PLAN O2


       PREDNISONE TAPER


       OFF ABX AS PER HERNANDO ARITA


       D/C PLANNING 





DR ROE

## 2018-03-12 NOTE — PN
Physical Exam: 


SUBJECTIVE: Patient seen and examined








OBJECTIVE:





 Vital Signs











 Period  Temp  Pulse  Resp  BP Sys/Abel  Pulse Ox


 


 Last 24 Hr  97.6 F-98.0 F  77-98  18-24  114-150/56-82  98-99











GENERAL: The patient is awake, alert, and fully oriented, in no acute distress.


HEAD: Normal with no signs of trauma.


EYES: PERRL, extraocular movements intact, sclera anicteric, conjunctiva clear. 

No ptosis. 


ENT: Ears normal, nares patent, oropharynx clear without exudates, moist mucous 


membranes.


NECK: Trachea midline, full range of motion, supple. 


LUNGS: Breath sounds equal, clear to auscultation bilaterally, no wheezes, no 

crackles, no 


accessory muscle use. 


HEART: Regular rate and rhythm, S1, S2 without murmur, rub or gallop.


ABDOMEN: Soft, nontender, nondistended, normoactive bowel sounds, no guarding, 

no 


rebound, no hepatosplenomegaly, no masses.


EXTREMITIES: 2+ pulses, warm, well-perfused, no edema. 


NEUROLOGICAL: Cranial nerves II through XII grossly intact. Normal speech, gait 

not 


observed.


PSYCH: Normal mood, normal affect.


SKIN: Warm, dry, normal turgor, no rashes or lesions noted














 Laboratory Results - last 24 hr











  03/12/18





  07:00


 


Sodium  140


 


Potassium  3.4 L


 


Chloride  103


 


Carbon Dioxide  25


 


Anion Gap  12


 


BUN  21 H


 


Creatinine  1.0


 


Random Glucose  130 H


 


Calcium  7.8 L


 


Digoxin  0.9463








Active Medications











Generic Name Dose Route Start Last Admin





  Trade Name Freq  PRN Reason Stop Dose Admin


 


Apixaban  5 mg  03/05/18 22:00  03/11/18 21:32





  Eliquis -  PO   5 mg





  BID MARCELO   Administration


 


Aspirin  81 mg  03/05/18 19:00  03/11/18 10:35





  Asa -  PO   81 mg





  DAILY MARCELO   Administration


 


Digoxin  0.25 mg  03/06/18 10:00  03/11/18 10:42





  Lanoxin -  PO   0.25 mg





  DAILY MARCELO   Administration


 


Diltiazem HCl  30 mg  03/05/18 22:00  03/12/18 06:21





  Cardizem -  PO   30 mg





  TID MRACELO   Administration


 


Docusate Sodium  100 mg  03/06/18 22:00  03/12/18 06:20





  Colace -  PO   100 mg





  TID MARCELO   Administration


 


Fluticasone Propionate  1 spray  03/07/18 11:00  03/11/18 21:33





  Flonase -  NS   1 inh





  BID MARCELO   Administration


 


Furosemide  40 mg  03/06/18 10:00  03/11/18 10:35





  Lasix -  PO   40 mg





  DAILY MARCELO   Administration


 


Hydromorphone HCl  8 mg  03/11/18 12:36  





  Dilaudid -  PO   





  Q8H PRN   





  PAIN LEVEL 7 - 10   


 


Melatonin  5 mg  03/08/18 00:09  03/10/18 21:18





  Melatonin  PO   5 mg





  HS PRN   Administration





  INSOMNIA   


 


Methylprednisolone Sodium Succinate  40 mg  03/10/18 18:00  03/12/18 02:10





  Solu-Medrol -  IVPUSH   40 mg





  Q8H-IV MARCELO   Administration


 


Morphine Sulfate  30 mg  03/05/18 19:00  03/12/18 07:04





  Ms Contin -  PO   Not Given





  BID@0600,1800 [facility]   


 


Non-Formulary Medication  25 mg  03/11/18 10:00  03/12/18 05:01





  Naloxegol Oxalate [Movantik]  PO   25 mg





  DAILY MARCELO   Administration


 


Pantoprazole Sodium  40 mg  03/11/18 12:45  03/11/18 13:09





  Protonix -  PO   40 mg





  DAILY MARCELO   Administration


 


Ranolazine  500 mg  03/05/18 22:00  03/11/18 21:32





  Ranexa -  PO   500 mg





  BID MARCELO   Administration


 


Rosuvastatin Calcium  20 mg  03/06/18 22:00  03/11/18 21:33





  Crestor -  PO   Not Given





  HS MARCELO   


 


Senna  2 tab  03/06/18 20:11  03/11/18 21:32





  Senna -  PO   2 tab





  HS PRN   Administration





  CONSTIPATION   


 


Tamsulosin HCl  0.4 mg  03/09/18 08:30  03/12/18 08:22





  Flomax -  PO   0.4 mg





  DAILY@0830 MARCELO   Administration


 


Thyroid  60 mg  03/06/18 10:11  03/12/18 06:20





  [place] Thyroid -  PO   60 mg





  DAILY@0700 MARCELO   Administration











ASSESSMENT/PLAN:


78 yo m hx of CAD with multiple stents (last cath was 1/2012 at [facility] [place]: dontae to om1

, residual dz: 40%pLCX, 50%pLAD, 60%mLAD, 80%D1, 40%pRCA), htn, hld, chronic 

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

PE/DVT S/P IVC Filter(2003) on eliquis, gerd, gastroparesis, BPH, Prostate Ca, 

hypothyroid, Chronic Pain Syndrome, Anxiety, OA here with sob  





a.e. copd:


- being treated by pulmonary team





chronic diast chf:


- equivocal clinical findings in office in past, possible HFpEF in addition to 

LE venous insufficiency


- no signs acute chf here


- continue home lasix as doing





CAD with prior pci (last 2012), elevated troponin: 


- persistent mild elevation (0.3-0.5) with flat trend.  EKG without acute 

ischemic changes. FINDINGS NOT C/W ACS. 


- ? low-level troponin sec to chronic diast CHF, vs residual elevation s/p Type 

II MI (demand ischemia) at time of recent rapid SVT at outside hospital (no 

troponin checked at that time)


- less likely myopericarditis, (very hi CRP noted at outside hospital = 27)--

echo TDS, no effusion, no friction rub. consider outpt repeat echo (+/- 

Definity echocontrast), +/- cardiac MRI if sx's continue.  


- added ASA for underlying CAD (also on eliquis).  


- consider outpt repeat stress test for risk stratification  


- pt is intolerant of statins. has been on PCSK-9 inhibitor (Repatha) for 

several months with good LDL lowering. has not f/u with me since starting it. 

outpt cardio f/u will be arranged





SVT


- Episode of SVT up to 180's on [facility] admit 2/27/2018.   


- per patient was started on diltiazem and digoxin (pt has new meds with him 

here). prior toprol was not continued.  


- 3/11: stable HR, con't current meds (dig level ok)





Bilateral DVT


- recent CTA at [facility] with no evidence of acute PE.  has h/o factor V leiden.  con

't eliquis.

## 2018-03-12 NOTE — DS
Physical Exam: 


SUBJECTIVE: Patient seen and examined








OBJECTIVE:





 Vital Signs











 Period  Temp  Pulse  Resp  BP Sys/Abel  Pulse Ox


 


 Last 24 Hr  97.6 F-98.0 F  77-98  18-20  125-150/56-82  99








PHYSICAL EXAM





GENERAL: The patient is awake, alert, and fully oriented, in no acute distress.


HEAD: Normal with no signs of trauma.


EYES: PERRL, extraocular movements intact, sclera anicteric, conjunctiva clear. 


ENT: Ears normal, nares patent, oropharynx clear without exudates, moist mucous 

membranes.


NECK: Trachea midline, full range of motion, supple. 


LUNGS: Breath sounds equal, clear to auscultation bilaterally, no wheezes, no 

crackles, no accessory muscle use. 


HEART: Regular rate and rhythm, S1, S2 without murmur, rub or gallop.


ABDOMEN: Soft, nontender, nondistended, normoactive bowel sounds, no guarding, 

no rebound, no hepatosplenomegaly, no masses.


EXTREMITIES: 2+ pulses, warm, well-perfused, no edema. 


NEUROLOGICAL: Cranial nerves II through XII grossly intact. Normal speech, gait 

not observed.


PSYCH: Normal mood, normal affect.


SKIN: Warm, dry, normal turgor, no rashes or lesions noted.





LABS


 Laboratory Results - last 24 hr











  03/12/18





  07:00


 


Sodium  140


 


Potassium  3.4 L


 


Chloride  103


 


Carbon Dioxide  25


 


Anion Gap  12


 


BUN  21 H


 


Creatinine  1.0


 


Random Glucose  130 H


 


Calcium  7.8 L


 


Digoxin  0.9463











HOSPITAL COURSE:





Date of Admission:03/05/18





Date of Discharge: 03/12/18














Discharge Summary


Reason For Visit: SOB


Current Active Problems





Elevated troponin I level (Acute)


Hypothyroid (Acute)


NSTEMI (non-ST elevated myocardial infarction) (Acute)


SOB (shortness of breath) (Acute)


SVT (supraventricular tachycardia) (Acute)








Condition: Improved





- Instructions


Referrals: 


Bg Joy MD [Staff Physician] - 


Disposition: SKILLED NURSING FACILITY





- Home Medications


Comprehensive Discharge Medication List: 


Ambulatory Orders





Hydromorphone HCl [Dilaudid] 8 mg PO TID PRN 08/26/16 


Linaclotide [Linzess] 290 mcg PO DAILY 08/26/16 


Thyroid [[place] Thyroid] 90 mg PO DAILY 08/26/16 


Apixaban [Eliquis] 5 mg PO BID 04/13/17 


Omeprazole 40 mg PO DAILY 04/13/17 


Tamsulosin HCl 0.4 mg PO DAILY 04/13/17 


Ranolazine [Ranexa] 500 mg PO BID 04/14/17 


Morphine *Sr* [MS Contin -] 30 mg PO Q12H PRN 12/04/17 


Albuterol 2.5/Ipratropium 0.5 [Duoneb -] 1 amp NEB PRN PRN 03/05/18 


Digoxin [Lanoxin -] 0.25 mg PO DAILY 03/05/18 


Fluticasone Prop 0.05% Nasal [Flonase -] 1 spray NS BID 03/05/18 


Alirocumab [Praluent Pen] 75 mg SQ ASDIR #1 ml 03/12/18 


Aspirin [ASA -] 81 mg PO DAILY  tab.chew 03/12/18 


Diltiazem Cd [Cardizem Cd -] 120 mg PO DAILY #30 cap.cd.24h 03/12/18 


Fluticasone Prop 0.05% Nasal [Flonase -] 1 spray NS BID  spray 03/12/18 


Furosemide [Lasix -] 40 mg PO DAILY  tablet 03/12/18 


Melatonin 5 mg PO HS PRN  tab 03/12/18 


Naloxegol Oxalate [Movantik] 25 mg PO DAILY #30 cap 03/12/18 


Pantoprazole Sodium [Protonix -] 40 mg PO DAILY  tablet.ec 03/12/18 


Prednisone See Taper PO ASDIR #21 tablet 03/12/18 


predniSONE [Deltasone -] See Taper PO ASDIR #21 tablet 03/12/18 











- Discharge Referral


Referred to [facility] No

## 2018-03-12 NOTE — PN
Progress Note, Physician


History of Present Illness: 





stable


no issues


slight confusion


had bm


thinks he did not





- Current Medication List


Current Medications: 


Active Medications





Apixaban (Eliquis -)  5 mg PO BID [facility]


   Last Admin: 03/12/18 09:35 Dose:  5 mg


Aspirin (Asa -)  81 mg PO DAILY [facility]


   Last Admin: 03/12/18 09:34 Dose:  81 mg


Digoxin (Lanoxin -)  0.25 mg PO DAILY [facility]


   Last Admin: 03/12/18 09:36 Dose:  0.25 mg


Diltiazem HCl (Cardizem -)  30 mg PO TID [facility]


   Last Admin: 03/12/18 13:17 Dose:  30 mg


Docusate Sodium (Colace -)  100 mg PO TID [facility]


   Last Admin: 03/12/18 13:17 Dose:  100 mg


Fluticasone Propionate (Flonase -)  1 spray NS BID [facility]


   Last Admin: 03/12/18 09:34 Dose:  1 inh


Furosemide (Lasix -)  40 mg PO DAILY [facility]


   Last Admin: 03/12/18 09:34 Dose:  40 mg


Hydromorphone HCl (Dilaudid -)  8 mg PO Q8H PRN


   PRN Reason: PAIN LEVEL 7 - 10


   Last Admin: 03/12/18 11:42 Dose:  8 mg


Melatonin (Melatonin)  5 mg PO HS PRN


   PRN Reason: INSOMNIA


   Last Admin: 03/10/18 21:18 Dose:  5 mg


Morphine Sulfate (Ms Contin -)  30 mg PO BID@0600,1800 [facility]


   Last Admin: 03/12/18 07:04 Dose:  Not Given


Non-Formulary Medication (Naloxegol Oxalate [Movantik])  25 mg PO DAILY [facility]


   Last Admin: 03/12/18 09:34 Dose:  25 mg


Pantoprazole Sodium (Protonix -)  40 mg PO DAILY [facility]


   Last Admin: 03/12/18 09:33 Dose:  40 mg


Prednisone (Deltasone -)  40 mg PO BID [facility]


   Last Admin: 03/12/18 11:20 Dose:  Not Given


Ranolazine (Ranexa -)  500 mg PO BID [facility]


   Last Admin: 03/12/18 09:33 Dose:  500 mg


Senna (Senna -)  2 tab PO HS PRN


   PRN Reason: CONSTIPATION


   Last Admin: 03/11/18 21:32 Dose:  2 tab


Tamsulosin HCl (Flomax -)  0.4 mg PO DAILY@0830 [facility]


   Last Admin: 03/12/18 08:22 Dose:  0.4 mg


Thyroid ([place] Thyroid -)  60 mg PO DAILY@0700 [facility]


   Last Admin: 03/12/18 06:20 Dose:  60 mg











- Objective


Vital Signs: 


 Vital Signs











Temperature  98.0 F   03/12/18 08:58


 


Pulse Rate  83   03/12/18 09:36


 


Respiratory Rate  18   03/12/18 09:00


 


Blood Pressure  132/73   03/12/18 08:58


 


O2 Sat by Pulse Oximetry (%)  99   03/12/18 09:00











Constitutional: Yes: No Distress, Calm


Cardiovascular: Yes: Regular Rate and Rhythm


Respiratory: Yes: Regular, CTA Bilaterally


Gastrointestinal: Yes: Normal Bowel Sounds, Soft


Musculoskeletal: Yes: WNL


Extremities: Yes: WNL


Neurological: Yes: Alert, Oriented


Labs: 


 CBC, BMP





 03/11/18 07:05 





 03/12/18 07:00 





 INR, PTT











INR  1.49  (0.82-1.09)  H  03/06/18  06:30    














Assessment/Plan











dyspnea


ll infiltrate


prostate ca


htn


hld


gm positive bacteremia





repeat blood cx negative








plan


continue current mgmt


continue monitoring


rest as per primary


avoid constipation

## 2018-03-12 NOTE — PN
Progress Note, Physician


Chief Complaint: 





sob


History of Present Illness: 





very angry that i have not yet seen him personally since he was admitted.


once he calms down, he states:


1) intermittent wheezing chronically at home, can't say if worse of late


2) sob acute on chronic is significantly improved here with ongoing copd tx


3) at times has burning in chest, at times other type of cp/heaviness--cannot 

say if any is his prior anginal sx (cannot recall), and whether both sx's are 

his usual GERD sx. thinks both of these cp sx's are longstanding and stable, 

but not sure


4) complying with repatha at home, no s.e.; continues to have worse acid reflux 

pains when takes the crestor (as with other statins, per pt)


5) no palpitations (including at time of SVT in [place])


6) intermitten leg swelling at home stable vs his baseline; has not been 

complying with rx'd lasix








no cigs





- Current Medication List


Current Medications: 


Active Medications





Apixaban (Eliquis -)  5 mg PO BID [facility]


   Last Admin: 03/11/18 21:32 Dose:  5 mg


Aspirin (Asa -)  81 mg PO DAILY [facility]


   Last Admin: 03/11/18 10:35 Dose:  81 mg


Digoxin (Lanoxin -)  0.25 mg PO DAILY [facility]


   Last Admin: 03/11/18 10:42 Dose:  0.25 mg


Diltiazem HCl (Cardizem -)  30 mg PO TID [facility]


   Last Admin: 03/12/18 06:21 Dose:  30 mg


Docusate Sodium (Colace -)  100 mg PO TID [facility]


   Last Admin: 03/12/18 06:20 Dose:  100 mg


Fluticasone Propionate (Flonase -)  1 spray NS BID [facility]


   Last Admin: 03/11/18 21:33 Dose:  1 inh


Furosemide (Lasix -)  40 mg PO DAILY [facility]


   Last Admin: 03/11/18 10:35 Dose:  40 mg


Hydromorphone HCl (Dilaudid -)  8 mg PO Q8H PRN


   PRN Reason: PAIN LEVEL 7 - 10


Melatonin (Melatonin)  5 mg PO HS PRN


   PRN Reason: INSOMNIA


   Last Admin: 03/10/18 21:18 Dose:  5 mg


Methylprednisolone Sodium Succinate (Solu-Medrol -)  40 mg IVPUSH Q8H-IV [facility]


   Last Admin: 03/12/18 02:10 Dose:  40 mg


Morphine Sulfate (Ms Contin -)  30 mg PO BID@0600,1800 [facility]


   Last Admin: 03/12/18 07:04 Dose:  Not Given


Non-Formulary Medication (Naloxegol Oxalate [Movantik])  25 mg PO DAILY [facility]


   Last Admin: 03/12/18 05:01 Dose:  25 mg


Pantoprazole Sodium (Protonix -)  40 mg PO DAILY [facility]


   Last Admin: 03/11/18 13:09 Dose:  40 mg


Ranolazine (Ranexa -)  500 mg PO BID [facility]


   Last Admin: 03/11/18 21:32 Dose:  500 mg


Rosuvastatin Calcium (Crestor -)  20 mg PO HS [facility]


   Last Admin: 03/11/18 21:33 Dose:  Not Given


Senna (Senna -)  2 tab PO HS PRN


   PRN Reason: CONSTIPATION


   Last Admin: 03/11/18 21:32 Dose:  2 tab


Tamsulosin HCl (Flomax -)  0.4 mg PO DAILY@0830 [facility]


   Last Admin: 03/12/18 08:22 Dose:  0.4 mg


Thyroid ([place] Thyroid -)  60 mg PO DAILY@0700 [facility]


   Last Admin: 03/12/18 06:20 Dose:  60 mg











- Objective


Vital Signs: 


 Vital Signs











Temperature  98.0 F   03/12/18 08:58


 


Pulse Rate  83   03/12/18 08:58


 


Respiratory Rate  18   03/12/18 08:58


 


Blood Pressure  132/73   03/12/18 08:58


 


O2 Sat by Pulse Oximetry (%)  99   03/11/18 21:00











Constitutional: Yes: No Distress, Calm


Eyes: No: Sclera Icterus


HENT: No: Nasal Congestion


Cardiovascular: Yes: Regular Rate and Rhythm, S1, S2, Other (PMI non diplaced).

  No: JVD, Gallop, Murmur


Respiratory: Yes: CTA Bilaterally.  No: Accessory Muscle Use, Rales, Wheezes


Gastrointestinal: Yes: Normal Bowel Sounds, Soft.  No: Tenderness


Musculoskeletal: Yes: Other (No kyphosis)


Extremities: No: Cold, Cyanosis


Edema: No


Integumentary: No: Jaundice


Neurological: Yes: Alert, Oriented (x3)


Psychiatric: No: Agitated


Labs: 


 CBC, BMP





 03/11/18 07:05 





 03/12/18 07:00 





 INR, PTT











INR  1.49  (0.82-1.09)  H  03/06/18  06:30    














Assessment/Plan





ecg: sr, non-specific t wave abnormalities - similar to priors.  no acute 

ischemic changes. 


2/27/18 ekg [facility]:  SVT, 181 bpm. subtle lateral ST abnormalities/STD  





ct scan here, images and report reviewed. + ILDz changes, severe calcification 

of cors.  see emr for detailed report





cxr:  scarring vs. left early lobe infiltrate. 


3/6 cxr: slightly better aeration at left base.  apical pleural thickening.     





PFTs [facility] 2/2018: moderate restrictive defect with mild decrease in diffusion 

capacity.  


CTA chest [facility] 2/2018: suboptimal opacification of pulmonary arterial tree 

limiting evaluation. NO ACUTE PE SEEN. + curvilinear filling defect in 

segmental branch of RLL similar to 2015 cta - may reflect chronic PE.  + aortic 

atherosclerosis.  prominent calcification of cors.  low lung volumes.  

peripheral/basilar reticular opacities, honeycombing and traction 

bronchiectasis within bilateral lower lobes.  scattered atelectasis/scarring.  

RML nodule.  mediastinal LAD, somewhat similar to 2015 CT.  


LE dopplers 2/2018:  B LE DVT's





Echo here 3/2018: tds. no pericardial effusion.  





Echo 2/2018: tds.  mild LVE.  nl lv fn.  EF 60-65%.  nl rv size/fn.  small 

pericardial effusion.  





Echo 2016: suboptimal views.  EF 50-55%. Impaired relaxation. Nl RV (suboptimal 

views). Nl valves. Mild ao dilation 3.8 cm with laminar atherosclerotic plaque. 





pharm stress 2016: No sx's.  No ischemic ekg changes. Small subtle 

inferolateral ischemia.  EF 57%.  








A/P





78 yo m hx of CAD with multiple stents (last cath was 1/2012 at [facility]: dontae to om1

, residual dz: 40%pLCX, 50%pLAD, 60%mLAD, 80%D1, 40%pRCA), htn, hld, chronic 

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

PE/DVT S/P IVC Filter(2003) on eliquis, gerd, gastroparesis, BPH, Prostate Ca, 

hypothyroid, Chronic Pain Syndrome, Anxiety, OA here with sob  





a.e. copd:


- being treated by pulmonary team, sx's improving





chronic diast chf:


- equivocal clinical findings in office in past, possible HFpEF in addition to 

LE venous insufficiency


- no signs acute chf here


- does not comply with rx'd daily maintenance lasix (uses prn edema)--on 40mg 

qd here, continue same





CAD with prior pci (last 2012), elevated troponin: 


- persistent mild elevation (0.3-0.5) with flat trend.  EKG without acute 

ischemic changes. FINDINGS NOT C/W ACS. 


- ? low-level troponin sec to chronic diast CHF, vs residual elevation s/p Type 

II MI (demand ischemia) at time of recent rapid SVT at outside hospital (no 

troponin checked at that time)


- less likely myopericarditis, (very hi CRP noted at outside hospital = 27)--

echo TDS, no effusion, no friction rub. given alternative etiology of hi CRP (

a.e. copd +/- pneumonitis), and pt sx's responded well to copd tx, will defer 

further w/u at present.


- consider outpt repeat echo (+/- Definity echocontrast), +/- cardiac MRI if sx'

s change in future (pt advised to f/u with me in office as soon as leaves SNF).

  


- added ASA for underlying CAD (also on eliquis).  


- metoprolol apparently stopped at [facility] when SVT treated with diltiazem/digoxin 

combo. cont holding metoprolol for now--observe as outpt if underlying sob 

improves (has copd with previous sporadic pulm f/u, no recent PFTs available, 

but some wheezing hx noted)


- consider outpt repeat stress test for risk stratification  


- pt is intolerant of multiple statins (severe GERD). has been on PCSK-9 

inhibitor (praluent 75mg q2 weeks injection) for several months with good LDL 

lowering--not on hospital formulary here--to be included on d/c med list per 

hospitalist jude (d/w'd her). has not f/u with me since starting it. outpt 

cardio f/u will be arranged





SVT


- Episode of SVT up to 180's on [facility] admit 2/27/2018.   


- per patient was started on diltiazem and digoxin (pt has new meds with him 

here). prior toprol was stopped.


- continue same meds (change diltiazem 30 tid to  qd--d/w'd hospitalist 

jude)





Bilateral DVT


- recent CTA at [facility] with no evidence of acute PE.  has h/o factor V leiden.  con

't eliquis.

## 2018-04-06 ENCOUNTER — HOSPITAL ENCOUNTER (INPATIENT)
Dept: HOSPITAL 74 - JER | Age: 78
LOS: 5 days | Discharge: HOME | DRG: 190 | End: 2018-04-11
Attending: INTERNAL MEDICINE | Admitting: HOSPITALIST
Payer: COMMERCIAL

## 2018-04-06 VITALS — BODY MASS INDEX: 30.9 KG/M2

## 2018-04-06 DIAGNOSIS — Z86.718: ICD-10-CM

## 2018-04-06 DIAGNOSIS — F41.9: ICD-10-CM

## 2018-04-06 DIAGNOSIS — D68.51: ICD-10-CM

## 2018-04-06 DIAGNOSIS — R10.9: ICD-10-CM

## 2018-04-06 DIAGNOSIS — J44.1: Primary | ICD-10-CM

## 2018-04-06 DIAGNOSIS — I50.33: ICD-10-CM

## 2018-04-06 DIAGNOSIS — K21.9: ICD-10-CM

## 2018-04-06 DIAGNOSIS — Z86.73: ICD-10-CM

## 2018-04-06 DIAGNOSIS — E03.9: ICD-10-CM

## 2018-04-06 DIAGNOSIS — I25.110: ICD-10-CM

## 2018-04-06 DIAGNOSIS — Z85.46: ICD-10-CM

## 2018-04-06 DIAGNOSIS — Z99.81: ICD-10-CM

## 2018-04-06 DIAGNOSIS — I11.0: ICD-10-CM

## 2018-04-06 DIAGNOSIS — Z95.5: ICD-10-CM

## 2018-04-06 DIAGNOSIS — Z87.442: ICD-10-CM

## 2018-04-06 DIAGNOSIS — J84.9: ICD-10-CM

## 2018-04-06 DIAGNOSIS — G89.4: ICD-10-CM

## 2018-04-06 DIAGNOSIS — E78.00: ICD-10-CM

## 2018-04-06 DIAGNOSIS — I25.2: ICD-10-CM

## 2018-04-06 DIAGNOSIS — N40.0: ICD-10-CM

## 2018-04-06 DIAGNOSIS — K57.90: ICD-10-CM

## 2018-04-06 LAB
ALBUMIN SERPL-MCNC: 3.2 G/DL (ref 3.4–5)
ALP SERPL-CCNC: 67 U/L (ref 45–117)
ALT SERPL-CCNC: 32 U/L (ref 12–78)
ANION GAP SERPL CALC-SCNC: 11 MMOL/L (ref 8–16)
AST SERPL-CCNC: 19 U/L (ref 15–37)
BASOPHILS # BLD: 0.6 % (ref 0–2)
BILIRUB SERPL-MCNC: 0.6 MG/DL (ref 0.2–1)
BNP SERPL-MCNC: 265.04 PG/ML (ref 5–450)
BUN SERPL-MCNC: 10 MG/DL (ref 7–18)
CALCIUM SERPL-MCNC: 9.1 MG/DL (ref 8.5–10.1)
CHLORIDE SERPL-SCNC: 103 MMOL/L (ref 98–107)
CO2 SERPL-SCNC: 28 MMOL/L (ref 21–32)
CREAT SERPL-MCNC: 1.1 MG/DL (ref 0.7–1.3)
DEPRECATED RDW RBC AUTO: 14.3 % (ref 11.9–15.9)
EOSINOPHIL # BLD: 1.7 % (ref 0–4.5)
GLUCOSE SERPL-MCNC: 111 MG/DL (ref 74–106)
HCT VFR BLD CALC: 39.9 % (ref 35.4–49)
HGB BLD-MCNC: 13.8 GM/DL (ref 11.7–16.9)
INR BLD: 1.5 (ref 0.82–1.09)
LYMPHOCYTES # BLD: 18.2 % (ref 8–40)
MAGNESIUM SERPL-MCNC: 2.2 MG/DL (ref 1.8–2.4)
MCH RBC QN AUTO: 32.6 PG (ref 25.7–33.7)
MCHC RBC AUTO-ENTMCNC: 34.5 G/DL (ref 32–35.9)
MCV RBC: 94.4 FL (ref 80–96)
MONOCYTES # BLD AUTO: 12.8 % (ref 3.8–10.2)
NEUTROPHILS # BLD: 66.7 % (ref 42.8–82.8)
PLATELET # BLD AUTO: 184 K/MM3 (ref 134–434)
PMV BLD: 7.6 FL (ref 7.5–11.1)
POTASSIUM SERPLBLD-SCNC: 4.1 MMOL/L (ref 3.5–5.1)
PROT SERPL-MCNC: 6.2 G/DL (ref 6.4–8.2)
PT PNL PPP: 16.9 SEC (ref 9.98–11.88)
RBC # BLD AUTO: 4.23 M/MM3 (ref 4–5.6)
SODIUM SERPL-SCNC: 142 MMOL/L (ref 136–145)
WBC # BLD AUTO: 5.2 K/MM3 (ref 4–10)

## 2018-04-06 PROCEDURE — G0378 HOSPITAL OBSERVATION PER HR: HCPCS

## 2018-04-06 NOTE — CON.CARD
Consult


Consult Specialty:: Cardiology


Referred by:: Benjie Meza MD


Reason for Consultation:: Dyspnea at rest





- History of Present Illness


Chief Complaint: Dyspnea at rest


History of Present Illness: 


I had the pleasure of seeing Mr. Oliva in self-referral for initial 

outpatient cardiovascular evaluation, previously saw Franny Joy and 

Fran. 78 yo  male h/o CAD s/p multivessel stents (last cath 01/ 2012 [facility] to OM1, residual lesions 40% pLCx, 50% pLAD, 60% mLAD, 80% D1, 40

% pRCA), HTN, chol, chronic atypical chest pain, COPD and ILD with h/o flares 

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 

Leiden s/p DVT/PE/IVC filter on Eliquis, gerd, gastroparesis, BPH, Prostate Ca, 

hypothyroid, Chronic Pain Syndrome, Anxiety, OA presented to office with 

increased dyspnea at rest, cough productive yellow sputum, wheezing, over past 

few days, reports chronic atypical chest pain, denies near or true syncope, 

palpitations, orthopnea, PND or LE edema. He just started Anoro MDI today, 

referred to emergency department for further evaluation and treatment, 

ambulates with walker assistance.





- History Source


History Provided By: Patient


Limitations to Obtaining History: No Limitations





- Past Medical History


CNS: Yes: CVA


Cardio/Vascular: Yes: CAD (multiple stents), Deep Vein Thrombosis, HTN, 

Hyperlipdemia, MI


Pulmonary: Yes: COPD


Gastrointestinal: Yes: GERD


Renal/: Yes: BPH, Cancer (Prostate)


Psych: Yes: Anxiety


Musculoskeletal: Yes: Osteoarthritis, Other (Chronic pain syndrome)





- Past Surgical History


Past Surgical History: Yes: Stent





- Alcohol/Substance Use


Hx Alcohol Use: No


History of Substance Use: reports: None





- Smoking History


Smoking history: Never smoked


Have you smoked in the past 12 months: No


Aproximately how many cigarettes per day: 0





- Social History


ADL: Independent


Occupation: Retired 


History of Recent Travel: No





Home Medications





- Allergies


Allergies/Adverse Reactions: 


 Allergies











Allergy/AdvReac Type Severity Reaction Status Date / Time


 


No Known Allergies Allergy   Verified 03/05/18 10:12














- Home Medications


Home Medications: 


Ambulatory Orders





Albuterol Sulfate [Proair Hfa] 2 puff IH BID 04/06/18 


Alirocumab [Praluent Pen] 75 mg SQ ASDIR 04/06/18 


Apixaban [Eliquis -] 5 mg PO BID 04/06/18 


Aspirin [ASA -] 81 mg PO DAILY 04/06/18 


Digoxin [Lanoxin -] 0.25 mg PO DAILY 04/06/18 


Diltiazem Cd [Cardizem Cd -] 120 mg PO DAILY 04/06/18 


Docusate Sodium 200 mg PO HS 04/06/18 


Fluticasone Prop 0.05% Nasal [Flonase -] 1 - 2 spray NS BID 04/06/18 


Furosemide [Lasix -] 40 mg PO DAILY 04/06/18 


HYDROmorphone [Dilaudid -] 6 mg PO Q8H PRN 04/06/18 


Levothyroxine Sodium [Synthroid] 88 mcg PO AM 04/06/18 


Linaclotide [Linzess] 290 mcg PO DAILY 04/06/18 


Melatonin 5 mg PO HS 04/06/18 


Morphine *Sr* [Ms Contin -] 30 mg PO Q12H PRN 04/06/18 


Naloxegol Oxalate [Movantik] 25 mg PO DAILY 04/06/18 


Omeprazole 40 mg PO DAILY 04/06/18 


Prednisone 10 mg PO DAILY 04/06/18 


Ranolazine [Ranexa -] 500 mg PO BID 04/06/18 


Tamsulosin HCl 0.4 mg PO DAILY 04/06/18 











Review of Systems





- Review of Systems


Respiratory: reports: Cough, Snoring, Wheezing


Vital Signs: 


 Vital Signs











Temperature  97.6 F   04/06/18 16:06


 


Pulse Rate  69   04/06/18 16:06


 


Respiratory Rate  20   04/06/18 16:06


 


Blood Pressure  120/66   04/06/18 16:06


 


O2 Sat by Pulse Oximetry (%)  100   04/06/18 16:06











Constitutional: Yes: No Distress, Calm


Neck: Yes: Supple


Respiratory: Yes: Regular, Diminished, SOB


Gastrointestinal: Yes: Normal Bowel Sounds, Soft


Cardiovascular: Yes: Regular Rate and Rhythm


JVD: No


Carotid Bruit: No


Heart Sounds: Yes: S1, S2


Edema: No





Imaging





- Results


Chest X-ray: Pending





Problem List





- Problems


(1) Coronary artery disease


Code(s): I25.10 - ATHSCL HEART DISEASE OF NATIVE CORONARY ARTERY W/O ANG PCTRS 

  


Qualifiers: 


   Coronary Disease-Associated Artery/Lesion type: native artery   Native vs. 

transplanted heart: native heart   Associated angina: without angina   

Qualified Code(s): I25.10 - Atherosclerotic heart disease of native coronary 

artery without angina pectoris   





(2) S/P coronary artery stent placement


Code(s): Z95.5 - PRESENCE OF CORONARY ANGIOPLASTY IMPLANT AND GRAFT   





(3) COPD (chronic obstructive pulmonary disease)


Code(s): J44.9 - CHRONIC OBSTRUCTIVE PULMONARY DISEASE, UNSPECIFIED   


Qualifiers: 


   COPD type: COPD with acute exacerbation   Qualified Code(s): J44.1 - Chronic 

obstructive pulmonary disease with (acute) exacerbation   





(4) CVA (cerebral vascular accident)


Code(s): I63.9 - CEREBRAL INFARCTION, UNSPECIFIED   


Qualifiers: 


   CVA mechanism: unspecified   Qualified Code(s): I63.9 - Cerebral infarction, 

unspecified   





(5) Chronic pain syndrome


Code(s): G89.4 - CHRONIC PAIN SYNDROME   





(6) HTN (hypertension)


Code(s): I10 - ESSENTIAL (PRIMARY) HYPERTENSION   


Qualifiers: 


   Hypertension type: essential hypertension   Qualified Code(s): I10 - 

Essential (primary) hypertension   





(7) Hypercholesteremia


Code(s): E78.0 - PURE HYPERCHOLESTEROLEMIA * DO NOT USE *   





(8) SOB (shortness of breath)


Code(s): R06.02 - SHORTNESS OF BREATH   





Assessment/Plan


ECG: NSR @ 84 with anterolateral Tw changes similar to previous


Chest CT: 03/07/2018 Intersitial lung disease with fibrotic changes similar to 

08/28/2016 severe coronary calcifications


Echocardiogram: 03/05/2018 Poor acoustic windows


Echocardiogram: 06/19/2013 Normal LV size and fxn, tr-mild TR, tr MR


Persantine Myoview: 08/29/2016 Small, subtle inferolateral mild ischemia, LVEF 

57%


Lab data: Reviewed





ASSESSMENT:


1.   Dyspnea referable to COPD, ILD flare


2.   CAD s/p multivessel PCI, angina pectoris


3.   Factor V Leiden, h/o DVT/PE/IVC filter on Eliquis


4.   H/o cerebrovascular disease


5.   HTN


6.   Chol


7.   Prostate ca


8.   Diastolic dysfunction


9.   Hypothyroidism





PLAN:


1.   Check BNP, rule out MI, check TSH, lipid panel


2.   IV steroids, O2 to maintain saO2>90%, BD, pulm input, f/u CXR


3.   Cardizem  qd, Lasix 40 qd, Eliquis 5 bid, Ranexa 500 bid


4.   Further plans to follow after hospital evaluation

## 2018-04-06 NOTE — PDOC
History of Present Illness





- General


History Source: Patient


Exam Limitations: No Limitations





- History of Present Illness


Initial Comments: 





04/06/18 21:12


Patient is a  77 year old male with a significant past medical history of CVA, 

CAD s/p stents x 2, factor V leiden, diverticulitis, DVT (on Eliquis 5 mg BID), 

GERD, kidney stones, MI, hypertension and COPD (home oxygen dependent), who 

presents to the ED with complaints of shortness of breath that began 2 weeks 

ago. Patient reports seeing Dr. Mcwilliams today when he began to feel increasingly 

unwell prompting his physician to send him to the ED for further evaluation. He 

reports experiencing gradual shortness of breath, that he states is increased 

with exertion making it so he cannot walk very long without getting winded. 

Patient reports experiencing associated symptoms of productive cough with 

yellow phlegm as well as bilateral lower extremity edema. 





Denies chest pain. Denies nausea, vomiting. Denies contact with sick individuals

, out of state travelling. Denies change in vision, head pain. Denies weakness/

numbness. Denies any other symptoms. 





Allergies: None


Social history: No smoking, No alcohol No illicit drugs. 


Surgical history: None


PMD: Dr. Benjie Meza


Cardiologist: Dr. Mcwilliams, Dr. Joy





<Dann Russell - Last Filed: 04/06/18 22:42>





<Danni Hernández - Last Filed: 04/06/18 23:25>





- General


Chief Complaint: Shortness of Breath


Stated Complaint: SHORTNESS OF BREATH


Time Seen by Provider: 04/06/18 16:36





Past History





<Dann Russell - Last Filed: 04/06/18 22:42>





- Past Medical History


Anemia: No


Asthma: No


Cancer: Yes (PROSTATE)


Cardiac Disorders: Yes (STENTS, FACTOR 5, CLOTTING DISORDER)


CVA: Yes (X2 '93 / '04 / (L) WEAKNESS)


COPD: Yes


CHF: No


Dementia: No


Diabetes: No


GI Disorders: Yes (diverticulosis,gerd)


 Disorders: Yes (kidney stone)


HTN: Yes


Hypercholesterolemia: Yes


Kidney Stones: Yes


Liver Disease: No


Seizures: No


Thyroid Disease: No





- Surgical History


Abdominal Surgery: No


Appendectomy: No


Cardiac Surgery: Yes (2 STENTS, SUJIT FILTER)


Cholecystectomy: No


Lung Surgery: No


Neurologic Surgery: No





- Immunization History


Immunization Up to Date: Yes





- Suicide/Smoking/Psychosocial Hx


Smoking Status: No


Smoking History: Never smoked


Have you smoked in the past 12 months: No


Number of Cigarettes Smoked Daily: 0


Information on smoking cessation initiated: No


Hx Alcohol Use: No


Drug/Substance Use Hx: No


Substance Use Type: None


Hx Substance Use Treatment: No





<EdgarHimanshujeff - Last Filed: 04/06/18 23:25>





- Past Medical History


Allergies/Adverse Reactions: 


 Allergies











Allergy/AdvReac Type Severity Reaction Status Date / Time


 


No Known Allergies Allergy   Verified 03/05/18 10:12











Home Medications: 


Ambulatory Orders





Albuterol Sulfate [Proair Hfa] 2 puff IH BID 04/06/18 


Alirocumab [Praluent Pen] 75 mg SQ ASDIR 04/06/18 


Apixaban [Eliquis -] 5 mg PO BID 04/06/18 


Aspirin [ASA -] 81 mg PO DAILY 04/06/18 


Digoxin [Lanoxin -] 0.25 mg PO DAILY 04/06/18 


Diltiazem Cd [Cardizem Cd -] 120 mg PO DAILY 04/06/18 


Docusate Sodium 200 mg PO HS 04/06/18 


Fluticasone Prop 0.05% Nasal [Flonase -] 1 - 2 spray NS BID 04/06/18 


Furosemide [Lasix -] 40 mg PO DAILY 04/06/18 


HYDROmorphone [Dilaudid -] 6 mg PO Q8H PRN 04/06/18 


Levothyroxine Sodium [Synthroid] 88 mcg PO AM 04/06/18 


Linaclotide [Linzess] 290 mcg PO DAILY 04/06/18 


Melatonin 5 mg PO HS 04/06/18 


Morphine *Sr* [Ms Contin -] 30 mg PO Q12H PRN 04/06/18 


Naloxegol Oxalate [Movantik] 25 mg PO DAILY 04/06/18 


Omeprazole 40 mg PO DAILY 04/06/18 


Prednisone 10 mg PO DAILY 04/06/18 


Ranolazine [Ranexa -] 500 mg PO BID 04/06/18 


Tamsulosin HCl 0.4 mg PO DAILY 04/06/18 











**Review of Systems





- Review of Systems


Able to Perform ROS?: Yes


Comments:: 





04/06/18 21:12


GENERAL/CONSTITUTIONAL: No fever or chills. No weakness.


HEAD, EYES, EARS, NOSE AND THROAT: No change in vision. No ear pain or 

discharge. No sore throat.


GASTROINTESTINAL: No nausea, vomiting, diarrhea or constipation.


GENITOURINARY: No dysuria, frequency, or change in urination.


CARDIOVASCULAR: +Shortness of breath. 


No chest pain 


RESPIRATORY: No cough, wheezing, or hemoptysis.


MUSCULOSKELETAL: +Bilateral lower extremity edema. 


No joint or muscle pain. No neck or back pain.


SKIN: No rash


NEUROLOGIC: No headache, vertigo, loss of consciousness, or change in strength/

sensation.


ENDOCRINE: No increased thirst. No abnormal weight change.


HEMATOLOGIC/LYMPHATIC: No anemia, easy bleeding, or history of blood clots.


ALLERGIC/IMMUNOLOGIC: No hives or skin allergy. 








<Dann Russell - Last Filed: 04/06/18 22:42>





*Physical Exam





- Vital Signs


 Last Vital Signs











Temp Pulse Resp BP Pulse Ox


 


 97.6 F   69   20   120/66   100 


 


 04/06/18 16:06  04/06/18 16:06  04/06/18 16:06  04/06/18 16:06  04/06/18 16:06














- Physical Exam


Comments: 





04/06/18 21:12


GENERAL: Awake, alert, and fully oriented, in no acute distress


HEAD: No signs of trauma


EYES: PERRLA, EOMI, sclera anicteric, conjunctiva clear


ENT: Auricles normal inspection, hearing grossly normal, nares patent, 

oropharynx clear without exudates. Moist mucosa


NECK: Normal ROM, supple, no lymphadenopathy, JVD, or masses


LUNGS: +Diminished breath sounds at bases bilaterally


clear to auscultation bilaterally. No wheezes, and no crackles


HEART: Regular rate and rhythm, normal S1 and S2, no murmurs, rubs or gallops


ABDOMEN: Soft, nontender, normoactive bowel sounds. No guarding, no rebound. No 

masses


EXTREMITIES: +2+ pitting edema to the knees b/l


Normal range of motion, no edema. No clubbing or cyanosis. No cords, erythema, 

or tenderness


NEUROLOGICAL: Normal speech, cranial nerves intact, negative pronator drift, 5/

5 strength in all 4 extremities, normal sensation to light touch in all 4 

extremities, normal cerebellar exam, normal reflexes and tone


SKIN: Warm, Dry, normal turgor, no rashes or lesions noted.








<Dann Russell - Last Filed: 04/06/18 22:42>





- Vital Signs


 Last Vital Signs











Temp Pulse Resp BP Pulse Ox


 


 97.6 F   69   20   120/66   100 


 


 04/06/18 16:06  04/06/18 16:06  04/06/18 16:06  04/06/18 16:06  04/06/18 16:06














<Danni Hernández - Last Filed: 04/06/18 23:25>





ED Treatment Course





- LABORATORY


CBC & Chemistry Diagram: 


 04/06/18 17:30





 04/06/18 17:30





- ADDITIONAL ORDERS


Additional order review: 


 Laboratory  Results











  04/06/18 04/06/18 04/06/18





  17:30 17:30 17:30


 


PT with INR   16.90 H 


 


INR   1.50 H 


 


PTT (Actin FS)   


 


Sodium    142


 


Potassium    4.1  D


 


Chloride    103


 


Carbon Dioxide    28


 


Anion Gap    11


 


BUN    10  D


 


Creatinine    1.1


 


Creat Clearance w eGFR    > 60


 


Random Glucose    111 H


 


Calcium    9.1


 


Magnesium    2.2


 


Total Bilirubin    0.6


 


AST    19  D


 


ALT    32  D


 


Alkaline Phosphatase    67


 


Troponin I  0.10 H D  


 


B-Natriuretic Peptide    265.04


 


Total Protein    6.2 L


 


Albumin    3.2 L














  04/06/18





  17:30


 


PT with INR 


 


INR 


 


PTT (Actin FS)  30.0


 


Sodium 


 


Potassium 


 


Chloride 


 


Carbon Dioxide 


 


Anion Gap 


 


BUN 


 


Creatinine 


 


Creat Clearance w eGFR 


 


Random Glucose 


 


Calcium 


 


Magnesium 


 


Total Bilirubin 


 


AST 


 


ALT 


 


Alkaline Phosphatase 


 


Troponin I 


 


B-Natriuretic Peptide 


 


Total Protein 


 


Albumin 








 











  04/06/18





  17:30


 


RBC  4.23


 


MCV  94.4


 


MCHC  34.5


 


RDW  14.3


 


MPV  7.6


 


Neutrophils %  66.7  D


 


Lymphocytes %  18.2  D


 


Monocytes %  12.8 H D


 


Eosinophils %  1.7  D


 


Basophils %  0.6  D














- Medications


Given in the ED: 


ED Medications














Discontinued Medications














Generic Name Dose Route Start Last Admin





  Trade Name Freq  PRN Reason Stop Dose Admin


 


Morphine Sulfate  4 mg  04/06/18 19:00  04/06/18 20:41





  Morphine Injection -  IVPUSH  04/06/18 19:01  4 mg





  ONCE ONE   Administration














<Dann Russell - Last Filed: 04/06/18 22:42>





- LABORATORY


CBC & Chemistry Diagram: 


 04/06/18 17:30





 04/06/18 17:30





- RADIOLOGY


Radiology Studies Ordered: 














 Category Date Time Status


 


 CHEST X-RAY PORTABLE* [RAD] Stat Radiology  04/06/18 17:12 Ordered


 


 DUPLEX VASCUL US-2LEGS [US] Stat Ultrasound  04/06/18 17:12 Ordered














<Danni Hernández - Last Filed: 04/06/18 23:25>





Medical Decision Making





- Medical Decision Making








04/06/18 22:42


Microblog admission sent at 22:12pm. 





<Dann Russell - Last Filed: 04/06/18 22:42>





- Medical Decision Making





04/06/18 17:37


76yo M hx CVA, CAD s/p stents x 2, factor V leiden, diverticulitis, DVT (on 

Elliquis 5mg BID), GERD, kidney stones, MI, hypertension and COPD (home oxygen 

dependent) sent in to the ED for admission from Dr. Mcwilliams's office for SOB. Exam 

with diminished BS at the bases and 2+ LE edeme. DDx includes ACS vs PE vs CHF 

vs PNA. 





04/06/18 22:58


Thus far, work up reveals elevated trop to 0.10. ASA 325mg given.  LE US with 

non occlusive clot in femoral vein. CTA with no evidence of PE. Hospitalist 

paged for admission, currently Dr. Yost at the bedside evaluating patient. Per 

his resident, they will let me know about the admission. 


 


04/06/18 23:25


Case discussed in detail with admitting physician including history, physical 

exam and ancillary studies.





Admitting physician has assumed care for the patient, will follow all pending 

diagnostics and will complete the evaluation and treatment.  





<Danni Hernández - Last Filed: 04/06/18 23:25>





*DC/Admit/Observation/Transfer





- Attestations


Scribe Attestion: 





04/06/18 21:13





Documentation prepared by Dann Russell, acting as medical scribe for Danni Hernández MD, MD/DO.





<Dann Russell - Last Filed: 04/06/18 22:42>





- Discharge Dispostion


Admit: Yes





- Attestations


Physician Attestion: 





04/06/18 23:23








I, Dr. Danni Hernández MD, attest that this document has been prepared under my 

direction and personally reviewed by me in its entirety.   I further attest, 

that it accurately reflects all work, treatment, procedures and medical decision

-making performed by me.  





<ShashankabilioHimanshujeff - Last Filed: 04/06/18 23:25>


Diagnosis at time of Disposition: 


 Shortness of breath








- Discharge Dispostion


Condition at time of disposition: Stable





- Referrals


Referrals: 


Benjie Meza MD [Primary Care Provider] - 





- Patient Instructions





- Post Discharge Activity

## 2018-04-06 NOTE — HP
CHIEF COMPLAINT:





PCP: Dr Derrick Waldrop





HISTORY OF PRESENT ILLNESS:


77 year old male with a PMH of CAD s/p multivessel stents (last cath 01/2012), 

HTN, dyslipidemia, chronic atypical chest pain, COPD and ILD, CVA x 2, factor V 

Leiden deficiency, h/o of DVT in LLE, PE, IVC filter, on Eliquis, GERD, BPH, 

Prostate Ca, hypothyroid, anxiety who presented to the hospital referred by his 

Cardiologist Dr Mcwilliams that saw him earlier today. He has been complaining of SOB

, cough, ringing up yellow sputum and chest pain. His pain is midsternal and 

lasts for an hour, not associated with physical activity. He also endorses 

lower extremity swelling. The patient denies palpitations, LOC, dizziness, fever

, chills. He is compliant with home medications. He started taking Anoro today. 

Denies dysuria, N/V, diarrhea.





ER course was notable for:


(1)EKG


(2)CBC, CMP








PAST MEDICAL HISTORY:


as above





PAST SURGICAL HISTORY:


n/a


Social History:


Smoking:no


Alcohol:no


Drugs: no





Family History:


n/a


Allergies





No Known Allergies Allergy (Verified 03/05/18 10:12)


 








HOME MEDICATIONS:


 Home Medications











 Medication  Instructions  Recorded


 


Albuterol Sulfate [Proair Hfa] 2 puff IH BID 04/06/18


 


Alirocumab [Praluent Pen] 75 mg SQ ASDIR 04/06/18


 


Apixaban [Eliquis -] 5 mg PO BID 04/06/18


 


Aspirin [ASA -] 81 mg PO DAILY 04/06/18


 


Digoxin [Lanoxin -] 0.25 mg PO DAILY 04/06/18


 


Diltiazem Cd [Cardizem Cd -] 120 mg PO DAILY 04/06/18


 


Docusate Sodium 200 mg PO HS 04/06/18


 


Fluticasone Prop 0.05% Nasal 1 - 2 spray NS BID 04/06/18





[Flonase -]  


 


Furosemide [Lasix -] 40 mg PO DAILY 04/06/18


 


HYDROmorphone [Dilaudid -] 6 mg PO Q8H PRN 04/06/18


 


Levothyroxine Sodium [Synthroid] 88 mcg PO AM 04/06/18


 


Linaclotide [Linzess] 290 mcg PO DAILY 04/06/18


 


Melatonin 5 mg PO HS 04/06/18


 


Morphine *Sr* [Ms Contin -] 30 mg PO Q12H PRN 04/06/18


 


Naloxegol Oxalate [Movantik] 25 mg PO DAILY 04/06/18


 


Omeprazole 40 mg PO DAILY 04/06/18


 


Prednisone 10 mg PO DAILY 04/06/18


 


Ranolazine [Ranexa -] 500 mg PO BID 04/06/18


 


Tamsulosin HCl 0.4 mg PO DAILY 04/06/18








REVIEW OF SYSTEMS


CONSTITUTIONAL: 


Absent:  fever, chills, diaphoresis, generalized weakness, malaise, loss of 

appetite, weight change


HEENT: 


Absent:  rhinorrhea, nasal congestion, throat pain, throat swelling, difficulty 

swallowing, mouth swelling, ear pain, eye pain, visual changes


CARDIOVASCULAR: chest pain, peripheral edema


Absent: syncope, palpitations, irregular heart rate, lightheadedness, 


RESPIRATORY:  shortness of breath,


Absent: cough, dyspnea with exertion, orthopnea, wheezing, stridor, hemoptysis


GASTROINTESTINAL:


Absent: abdominal pain, abdominal distension, nausea, vomiting, diarrhea, 

constipation, melena, hematochezia


GENITOURINARY: 


Absent: dysuria, frequency, urgency, hesitancy, hematuria, flank pain, genital 

pain


MUSCULOSKELETAL: 


Absent: myalgia, arthralgia, joint swelling, back pain, neck pain


SKIN: 


Absent: rash, itching, pallor


ENDOCRINE:


Absent: unexplained weight gain, unexplained weight loss, heat intolerance, 

cold intolerance


NEUROLOGIC: 


Absent: headache, focal weakness or paresthesias, dizziness, unsteady gait


PSYCHIATRIC: 


Absent: anxiety, depression, 








PHYSICAL EXAMINATION


 Vital Signs - 24 hr











  04/06/18





  16:06


 


Temperature 97.6 F


 


Pulse Rate 69


 


Respiratory 20





Rate 


 


Blood Pressure 120/66


 


O2 Sat by Pulse 100





Oximetry (%) 











GENERAL: Awake, alert, and fully oriented, in no acute distress.


HEAD: Normal with no signs of trauma.


EYES: Pupils equal, round and reactive to light, extraocular movements intact, 

sclera anicteric, conjunctiva clear. No lid lag.


EARS, NOSE, THROAT: Ears normal, nares patent, oropharynx clear without 

exudates. Moist mucous membranes.


NECK: Normal range of motion, supple without lymphadenopathy, JVD, or masses.


LUNGS: Breath sounds equal, clear to auscultation bilaterally. No wheezes, and 

no crackles. No accessory muscle use.


HEART: Regular rate and rhythm, normal S1 and S2 without murmur, rub or gallop.


ABDOMEN: Soft, nontender, not distended, normoactive bowel sounds, no guarding, 

no rebound, no masses.  No hepatomegaly or  splenomegaly. 


MUSCULOSKELETAL: Normal range of motion at all joints. No bony deformities or 

tenderness.


UPPER EXTREMITIES: 2+ pulses, warm. No peripheral edema.


LOWER EXTREMITIES: 2+ pulses, warm, trace peripheral edema. 


NEUROLOGICAL:  Cranial nerves II-XII intact. Normal speech. Normal gait.


PSYCHIATRIC: Cooperative, anxious.


SKIN: Warm, dry, normal turgor, no rashes.


 Laboratory Results - last 24 hr











  04/06/18 04/06/18 04/06/18





  17:30 17:30 17:30


 


WBC  5.2  D  


 


RBC  4.23  


 


Hgb  13.8  


 


Hct  39.9  


 


MCV  94.4  


 


MCH  32.6  


 


MCHC  34.5  


 


RDW  14.3  


 


Plt Count  184  


 


MPV  7.6  


 


Neutrophils %  66.7  D  


 


Lymphocytes %  18.2  D  


 


Monocytes %  12.8 H D  


 


Eosinophils %  1.7  D  


 


Basophils %  0.6  D  


 


PT with INR   


 


INR   


 


PTT (Actin FS)   30.0 


 


Sodium    142


 


Potassium    4.1  D


 


Chloride    103


 


Carbon Dioxide    28


 


Anion Gap    11


 


BUN    10  D


 


Creatinine    1.1


 


Creat Clearance w eGFR    > 60


 


Random Glucose    111 H


 


Calcium    9.1


 


Magnesium    2.2


 


Total Bilirubin    0.6


 


AST    19  D


 


ALT    32  D


 


Alkaline Phosphatase    67


 


Troponin I   


 


B-Natriuretic Peptide    265.04


 


Total Protein    6.2 L


 


Albumin    3.2 L














  04/06/18 04/06/18 04/06/18





  17:30 17:30 22:30


 


WBC   


 


RBC   


 


Hgb   


 


Hct   


 


MCV   


 


MCH   


 


MCHC   


 


RDW   


 


Plt Count   


 


MPV   


 


Neutrophils %   


 


Lymphocytes %   


 


Monocytes %   


 


Eosinophils %   


 


Basophils %   


 


PT with INR  16.90 H  


 


INR  1.50 H  


 


PTT (Actin FS)   


 


Sodium   


 


Potassium   


 


Chloride   


 


Carbon Dioxide   


 


Anion Gap   


 


BUN   


 


Creatinine   


 


Creat Clearance w eGFR   


 


Random Glucose   


 


Calcium   


 


Magnesium   


 


Total Bilirubin   


 


AST   


 


ALT   


 


Alkaline Phosphatase   


 


Troponin I   0.10 H D  0.09 H


 


B-Natriuretic Peptide   


 


Total Protein   


 


Albumin   











ASSESSMENT/PLAN:


77 year old male with a PMH of CAD s/p multivessel stents, HTN, dyslipidemia, 

chronic atypical chest pain, COPD and ILD, CVA x 2, factor V Leiden deficiency, 

h/o of DVT in LLE, PE, IVC filter, on Eliquis, GERD, BPH, Prostate Ca, 

hypothyroid, anxiety who presented to the hospital referred by his Cardiologist 

Dr Mcwilliams He is admitted for SOB.





SOB: 


-r/o ACS, CHF exacerbation less likely, no peripheral edema, BNP normal, PE nl


-no acute ekg changes, no florence/std


-continue cardiac monitoring on telemetry


-will be evaluated by Dr Mcwilliams tomorrow


-resume home medications


-ECHO


-f/u troponins, 0.1, followed by 0.09, f/u third one


-f/u flu swab, urine ag





COPD:


-the pat reports cough, SOB


-CX, chest CT-no acute pathology


-will continue home meds





HTN:


cont home medications





Dyslipidemia:


-continue statins





Chronic chest pain:


-continue home pain meds, started oxycontin, resume Dilaulid if needed





DVT PPX:


-on Eliquis, svc filter





F/E/N


no fluids/no changes/low sodium





Disposition:


tele obs








- 





Problem List





- Problem


(1) Coronary artery disease


Code(s): I25.10 - ATHSCL HEART DISEASE OF NATIVE CORONARY ARTERY W/O ANG PCTRS 

  


Qualifiers: 


   Coronary Disease-Associated Artery/Lesion type: native artery   Native vs. 

transplanted heart: native heart   Associated angina: without angina   

Qualified Code(s): I25.10 - Atherosclerotic heart disease of native coronary 

artery without angina pectoris   





(2) S/P coronary artery stent placement


Code(s): Z95.5 - PRESENCE OF CORONARY ANGIOPLASTY IMPLANT AND GRAFT   





(3) SOB (shortness of breath)


Code(s): R06.02 - SHORTNESS OF BREATH   





(4) Anxiety


Code(s): F41.9 - ANXIETY DISORDER, UNSPECIFIED   





(5) COPD (chronic obstructive pulmonary disease)


Code(s): J44.9 - CHRONIC OBSTRUCTIVE PULMONARY DISEASE, UNSPECIFIED   


Qualifiers: 


   COPD type: COPD with acute exacerbation   Qualified Code(s): J44.1 - Chronic 

obstructive pulmonary disease with (acute) exacerbation   





(6) Chest pain


Code(s): R07.9 - CHEST PAIN, UNSPECIFIED   


Qualifiers: 


   Chest pain type: unspecified   Qualified Code(s): R07.9 - Chest pain, 

unspecified   





(7) Chronic abdominal pain


Code(s): R10.9 - UNSPECIFIED ABDOMINAL PAIN; G89.29 - OTHER CHRONIC PAIN   





(8) Chronic pain syndrome


Code(s): G89.4 - CHRONIC PAIN SYNDROME   





(9) Factor V deficiency


Code(s): D68.2 - HEREDITARY DEFICIENCY OF OTHER CLOTTING FACTORS   





(10) GERD (gastroesophageal reflux disease)


Code(s): K21.9 - GASTRO-ESOPHAGEAL REFLUX DISEASE WITHOUT ESOPHAGITIS   





(11) HTN (hypertension)


Code(s): I10 - ESSENTIAL (PRIMARY) HYPERTENSION   


Qualifiers: 


   Hypertension type: essential hypertension   Qualified Code(s): I10 - 

Essential (primary) hypertension   





(12) Hypercholesteremia


Code(s): E78.0 - PURE HYPERCHOLESTEROLEMIA * DO NOT USE *   





Visit type





- Emergency Visit


Emergency Visit: Yes


ED Registration Date: 04/06/18


Care time: The patient presented to the Emergency Department on the above date 

and was hospitalized for further evaluation of their emergent condition.





- New Patient


This patient is new to me today: Yes


Date on this admission: 04/07/18





- Critical Care


Critical Care patient: No





Hospitalist Screening





- Colonoscopy Questionnaire


Colonoscopy Questionnaire: 





Colonoscopy Questionnaire








-   Patient:


50 - 75 years old and never had a screening colonoscopy: No


History of colon or rectal polyps, or CA: No


History of IBD, Crohn's disease or UC: No


History of abdominal radiation therapy as a child: No





-   Relative:


1 with colon or rectal CA, or polyps at age 60 or younger: No


Colon or rectal CA diagnosed at age 45 or younger: No


Multiple relatives with colon or rectal CA: No





-   Outcome:


Screening Result: Negative Screen

## 2018-04-06 NOTE — PN
Teaching Attending Note


Name of Resident: Bebe Ocampo





ATTENDING PHYSICIAN STATEMENT





I saw and evaluated the patient.


I reviewed the resident's note and discussed the case with the resident.


I agree with the resident's findings and plan as documented.








SUBJECTIVE:








OBJECTIVE:








ASSESSMENT AND PLAN:


78 y/o male presented to the ER after the patient was seen by his cardiologist 

today, when he was complaining of SOB, patient stated that he is also having 

productive yellow sputum, chest pain that is worse with breathing, located on 

the right side mostly and is reproducible, he denied any fever or chill, he was 

also complaining of lower Ext swelling, however the patient didnt have any 

edema on exam 


patient was noted to have elevated troponin that is trending down 


will admit the patient to the obs tele 


consult cardiology 


cw aspirin 


lisa the troponin 


trend the ECG 


c/w home medication 


flu swab

## 2018-04-07 LAB
ALBUMIN SERPL-MCNC: 2.7 G/DL (ref 3.4–5)
ALP SERPL-CCNC: 59 U/L (ref 45–117)
ALT SERPL-CCNC: 31 U/L (ref 12–78)
ANION GAP SERPL CALC-SCNC: 9 MMOL/L (ref 8–16)
AST SERPL-CCNC: 19 U/L (ref 15–37)
BASOPHILS # BLD: 0.7 % (ref 0–2)
BILIRUB SERPL-MCNC: 0.4 MG/DL (ref 0.2–1)
BUN SERPL-MCNC: 8 MG/DL (ref 7–18)
CALCIUM SERPL-MCNC: 7.8 MG/DL (ref 8.5–10.1)
CHLORIDE SERPL-SCNC: 106 MMOL/L (ref 98–107)
CHOLEST SERPL-MCNC: 95 MG/DL (ref 50–200)
CO2 SERPL-SCNC: 26 MMOL/L (ref 21–32)
CREAT SERPL-MCNC: 1 MG/DL (ref 0.7–1.3)
DEPRECATED RDW RBC AUTO: 14.4 % (ref 11.9–15.9)
EOSINOPHIL # BLD: 2.4 % (ref 0–4.5)
GLUCOSE SERPL-MCNC: 110 MG/DL (ref 74–106)
HCT VFR BLD CALC: 36.4 % (ref 35.4–49)
HDLC SERPL-MCNC: 51 MG/DL (ref 40–60)
HGB BLD-MCNC: 12.6 GM/DL (ref 11.7–16.9)
LDLC SERPL CALC-MCNC: 42 MG/DL (ref 5–100)
LYMPHOCYTES # BLD: 23.3 % (ref 8–40)
MAGNESIUM SERPL-MCNC: 2 MG/DL (ref 1.8–2.4)
MCH RBC QN AUTO: 32.3 PG (ref 25.7–33.7)
MCHC RBC AUTO-ENTMCNC: 34.5 G/DL (ref 32–35.9)
MCV RBC: 93.6 FL (ref 80–96)
MONOCYTES # BLD AUTO: 13.7 % (ref 3.8–10.2)
NEUTROPHILS # BLD: 59.9 % (ref 42.8–82.8)
PHOSPHATE SERPL-MCNC: 3.6 MG/DL (ref 2.5–4.9)
PLATELET # BLD AUTO: 175 K/MM3 (ref 134–434)
PMV BLD: 7.4 FL (ref 7.5–11.1)
POTASSIUM SERPLBLD-SCNC: 3.7 MMOL/L (ref 3.5–5.1)
PROT SERPL-MCNC: 5.5 G/DL (ref 6.4–8.2)
RBC # BLD AUTO: 3.89 M/MM3 (ref 4–5.6)
SODIUM SERPL-SCNC: 141 MMOL/L (ref 136–145)
TRIGL SERPL-MCNC: 112 MG/DL (ref 35–160)
WBC # BLD AUTO: 5.2 K/MM3 (ref 4–10)

## 2018-04-07 RX ADMIN — PANTOPRAZOLE SODIUM SCH MG: 40 TABLET, DELAYED RELEASE ORAL at 09:23

## 2018-04-07 RX ADMIN — MORPHINE SULFATE SCH MG: 30 TABLET, EXTENDED RELEASE ORAL at 09:24

## 2018-04-07 RX ADMIN — ASPIRIN 81 MG SCH MG: 81 TABLET ORAL at 09:23

## 2018-04-07 RX ADMIN — Medication SCH MG: at 21:51

## 2018-04-07 RX ADMIN — MORPHINE SULFATE SCH MG: 30 TABLET, EXTENDED RELEASE ORAL at 21:50

## 2018-04-07 RX ADMIN — RANOLAZINE SCH MG: 500 TABLET, FILM COATED, EXTENDED RELEASE ORAL at 21:50

## 2018-04-07 RX ADMIN — FUROSEMIDE SCH MG: 40 TABLET ORAL at 09:23

## 2018-04-07 RX ADMIN — APIXABAN SCH MG: 5 TABLET, FILM COATED ORAL at 21:50

## 2018-04-07 RX ADMIN — DOCUSATE SODIUM SCH MG: 100 CAPSULE, LIQUID FILLED ORAL at 21:50

## 2018-04-07 RX ADMIN — TAMSULOSIN HYDROCHLORIDE SCH MG: 0.4 CAPSULE ORAL at 09:23

## 2018-04-07 RX ADMIN — RANOLAZINE SCH MG: 500 TABLET, FILM COATED, EXTENDED RELEASE ORAL at 09:23

## 2018-04-07 RX ADMIN — APIXABAN SCH MG: 5 TABLET, FILM COATED ORAL at 09:23

## 2018-04-07 NOTE — EKG
Test Reason : 

Blood Pressure : ***/*** mmHG

Vent. Rate : 076 BPM     Atrial Rate : 076 BPM

   P-R Int : 148 ms          QRS Dur : 084 ms

    QT Int : 400 ms       P-R-T Axes : 014 -04 008 degrees

   QTc Int : 450 ms

 

SINUS RHYTHM WITH PREMATURE ATRIAL COMPLEXES

NONSPECIFIC T WAVE ABNORMALITY

ABNORMAL ECG

WHEN COMPARED WITH ECG OF 06-APR-2018 16:19,

NONSPECIFIC T WAVE ABNORMALITY NOW EVIDENT IN LATERAL LEADS

Confirmed by SANDI SOLIMAN, THALIA ([id number]) on 4/7/2018 11:35:55 AM

 

Referred By:             Confirmed By:THALIA JUNIOR MD

## 2018-04-07 NOTE — CON.GI
Consult


Consult Specialty:: Dr. Braden covering for Dr. Walker


Referred by:: Hospitalist


Reason for Consultation:: Abdominal pain





- History of Present Illness


Chief Complaint: Abdominal pain


History of Present Illness: 





77M admitted through [facility] ER for evaluation of chest pain and SOB.  Called to 

evaluate abdominal pain.  The Hospitalist note describes RLQ pain., Mr. Oliva 

points towards his LUQ.  He complains of constipation.  I had followed him some 

years ago and it appears as though in 2017 he began to follow with Dr. Bulmaro Cohen.  Mr. Oliva states getting a call from Dr. Cohen two weeks ago 

prompting him to stop Movantik due to potential interaction with one of his 

medications. He also had Mr. Oliva perform a gastric emptying study 4/17 that 

revealed delayed emptying.  he has also had 7 CT scans of the abodmen and 

pelvis spanning from from 2010 along, some with and some without contrast.  

they revealed nephrolithiasis.  He has also had multiple CT scan's of the head 

during that time as well.  He has had an UGIS revealing reflux.  His last 

colonoscopy appears to have been in 2010 performed by Dr. Alicea that revealed 

a ? burnt out right sided colitis.  He also had an EGD around that that was 

unrevealing aside from antral erosions.  There has been no rectal bleeding, 

nausea, vomiting.  The pain may get better with a bowel movement           





- History Source


History Provided By: Patient, Medical Record





- Past Medical History


CNS: Yes: CVA


Cardio/Vascular: Yes: CAD (multiple stents), Deep Vein Thrombosis, HTN, 

Hyperlipdemia, MI


Pulmonary: Yes: COPD


Gastrointestinal: Yes: GERD


Renal/: Yes: BPH, Cancer (Prostate)


Psych: Yes: Anxiety


Musculoskeletal: Yes: Osteoarthritis, Other (Chronic pain syndrome)





- Past Surgical History


Past Surgical History: Yes: Stent





- Alcohol/Substance Use


Hx Alcohol Use: No


History of Substance Use: reports: None





- Smoking History


Smoking history: Never smoked


Have you smoked in the past 12 months: No


Aproximately how many cigarettes per day: 0





- Social History


ADL: Independent


Occupation: Retired 


History of Recent Travel: No





Home Medications





- Allergies


Allergies/Adverse Reactions: 


 Allergies











Allergy/AdvReac Type Severity Reaction Status Date / Time


 


No Known Allergies Allergy   Verified 03/05/18 10:12














- Home Medications


Home Medications: 


Ambulatory Orders





Albuterol Sulfate [Proair Hfa] 2 puff IH BID 04/06/18 


Alirocumab [Praluent Pen] 75 mg SQ ASDIR 04/06/18 


Apixaban [Eliquis -] 5 mg PO BID 04/06/18 


Aspirin [ASA -] 81 mg PO DAILY 04/06/18 


Diltiazem Cd [Cardizem Cd -] 120 mg PO DAILY 04/06/18 


Docusate Sodium 200 mg PO HS 04/06/18 


Fluticasone Prop 0.05% Nasal [Flonase -] 1 - 2 spray NS BID 04/06/18 


Furosemide [Lasix -] 40 mg PO DAILY 04/06/18 


HYDROmorphone [Dilaudid -] 6 mg PO Q8H PRN 04/06/18 


Levothyroxine Sodium [Synthroid] 88 mcg PO AM 04/06/18 


Linaclotide [Linzess] 290 mcg PO DAILY 04/06/18 


Melatonin 5 mg PO HS 04/06/18 


Metoprolol Succinate 25 mg PO HS 04/06/18 


Morphine *Sr* [Ms Contin -] 30 mg PO Q12H PRN 04/06/18 


Naloxegol Oxalate [Movantik] 25 mg PO DAILY 04/06/18 


Omeprazole 40 mg PO DAILY 04/06/18 


Ranolazine [Ranexa -] 500 mg PO BID 04/06/18 


Tamsulosin HCl 0.4 mg PO DAILY 04/06/18 











Family Disease History





- Family Disease History


Other Family History: No family history of colorectal cancer or other GI 

malignancy





Review of Systems





- Review of Systems


Constitutional: denies: Fever


Cardiovascular: reports: Chest Pain, Shortness of Breath


Respiratory: reports: Cough.  denies: Hemoptysis


Gastrointestinal: reports: Abdominal Pain, Constipation.  denies: Diarrhea, 

Rectal Bleeding, Vomiting Blood


Musculoskeletal: reports: Back Pain





Physical Exam-GI


Vital Signs: 


 Vital Signs











Temperature  98.1 F   04/07/18 14:00


 


Pulse Rate  90   04/07/18 14:00


 


Respiratory Rate  18   04/07/18 14:00


 


Blood Pressure  114/62   04/07/18 14:00


 


O2 Sat by Pulse Oximetry (%)  98   04/07/18 09:00











Constitutional: Yes: Calm


Eyes: No: Sclera Icterus


Cardiovascular: Yes: Regular Rate and Rhythm.  No: Murmur


Respiratory: Yes: Diminished (at bases, poor insp effort)


Gastrointestinal Inspection: No: Distention, Scars


...Auscultate: Yes: Normoactive Bowel Sounds


...Palpate: Yes: Soft, Tenderness (in LUQ however when patient distracted with 

conversation, I was unable to elicit tenderness).  No: Guarding, Tenderness, 

Rebound


...Percussion: No: Tympanitic


...Rectal Exam: Yes: Other (No external lesions, no masses, scant brown stool, 

guaiac negative)


Edema: No (No LE edema)


Neurological: Yes: Alert, Oriented


Labs: 


 CBC, BMP





 04/07/18 05:00 





 04/07/18 05:00 





 INR, PTT











INR  1.50  (0.82-1.09)  H  04/06/18  17:30    














Problem List





- Problems


(1) Abdominal pain


Assessment/Plan: 


Migratory in nature.  Was RLQ when evaluated by hospitalist and LUQ when I 

evaluated Mr. Oliva.  When distracted with conversation I was unable to 

reproduce the pain her had previously described. AXR unrevealing and he 

finished contrast for CT scan of the abdomen and pelvis ordered by hospitalist.

  


- Await CT


- MiraLAX 17g daily after CT scan


- Avoid opiate analgesia and monitor for withdrawal


- Clear liquid diet


Code(s): R10.9 - UNSPECIFIED ABDOMINAL PAIN

## 2018-04-07 NOTE — EKG
Test Reason : 

Blood Pressure : ***/*** mmHG

Vent. Rate : 077 BPM     Atrial Rate : 077 BPM

   P-R Int : 142 ms          QRS Dur : 086 ms

    QT Int : 394 ms       P-R-T Axes : 002 -06 049 degrees

   QTc Int : 445 ms

 

*** POOR DATA QUALITY, INTERPRETATION MAY BE ADVERSELY AFFECTED

SINUS RHYTHM WITH PREMATURE ATRIAL COMPLEXES

NONSPECIFIC T WAVE ABNORMALITY

ABNORMAL ECG

WHEN COMPARED WITH ECG OF 05-MAR-2018 10:12,

PREMATURE ATRIAL COMPLEXES ARE NOW PRESENT

Confirmed by SANDI SOLIMAN, THALIA ([id number]) on 4/7/2018 11:35:49 AM

 

Referred By:             Confirmed By:THALIA JUNIOR MD

## 2018-04-07 NOTE — PN
Physical Exam: 


SUBJECTIVE: Patient seen and examined. complaining of abdominal pain in RLQ area

, sharp, constant. He says did not notice it yesterday when chest pain was 

there but now it is more significant. chest pain is still there as a dull ache 

in center of chest.








OBJECTIVE:





 Vital Signs











 Period  Temp  Pulse  Resp  BP Sys/Abel  Pulse Ox


 


 Last 24 Hr  97.6 F-98.2 F  69-92  18-21  120-136/55-77  











GENERAL: The patient was sleeping comfortably, arousable easily. oriented x3 

alert


LUNGS: Breath sounds equal, clear to auscultation bilaterally, no wheezes, no 

crackles, no 


accessory muscle use. 


HEART: Regular rate and rhythm, S1, S2 without murmur, rub or gallop.


ABDOMEN: Soft, RLQ tenderness with rebound no guarding BS+


EXTREMITIES: 2+ pulses, warm, well-perfused, no edema. 

















 Laboratory Results - last 24 hr











  04/06/18 04/06/18 04/06/18





  17:30 17:30 17:30


 


WBC  5.2  D  


 


RBC  4.23  


 


Hgb  13.8  


 


Hct  39.9  


 


MCV  94.4  


 


MCH  32.6  


 


MCHC  34.5  


 


RDW  14.3  


 


Plt Count  184  


 


MPV  7.6  


 


Neutrophils %  66.7  D  


 


Lymphocytes %  18.2  D  


 


Monocytes %  12.8 H D  


 


Eosinophils %  1.7  D  


 


Basophils %  0.6  D  


 


PT with INR   


 


INR   


 


PTT (Actin FS)   30.0 


 


Sodium    142


 


Potassium    4.1  D


 


Chloride    103


 


Carbon Dioxide    28


 


Anion Gap    11


 


BUN    10  D


 


Creatinine    1.1


 


Creat Clearance w eGFR    > 60


 


Random Glucose    111 H


 


Calcium    9.1


 


Phosphorus   


 


Magnesium    2.2


 


Total Bilirubin    0.6


 


AST    19  D


 


ALT    32  D


 


Alkaline Phosphatase    67


 


Troponin I   


 


B-Natriuretic Peptide    265.04


 


Total Protein    6.2 L


 


Albumin    3.2 L


 


Triglycerides   


 


Cholesterol   


 


Total LDL Cholesterol   


 


HDL Cholesterol   














  04/06/18 04/06/18 04/06/18





  17:30 17:30 22:30


 


WBC   


 


RBC   


 


Hgb   


 


Hct   


 


MCV   


 


MCH   


 


MCHC   


 


RDW   


 


Plt Count   


 


MPV   


 


Neutrophils %   


 


Lymphocytes %   


 


Monocytes %   


 


Eosinophils %   


 


Basophils %   


 


PT with INR  16.90 H  


 


INR  1.50 H  


 


PTT (Actin FS)   


 


Sodium   


 


Potassium   


 


Chloride   


 


Carbon Dioxide   


 


Anion Gap   


 


BUN   


 


Creatinine   


 


Creat Clearance w eGFR   


 


Random Glucose   


 


Calcium   


 


Phosphorus   


 


Magnesium   


 


Total Bilirubin   


 


AST   


 


ALT   


 


Alkaline Phosphatase   


 


Troponin I   0.10 H D  0.09 H


 


B-Natriuretic Peptide   


 


Total Protein   


 


Albumin   


 


Triglycerides   


 


Cholesterol   


 


Total LDL Cholesterol   


 


HDL Cholesterol   














  04/07/18 04/07/18





  05:00 05:00


 


WBC  5.2 


 


RBC  3.89 L 


 


Hgb  12.6 


 


Hct  36.4 


 


MCV  93.6 


 


MCH  32.3 


 


MCHC  34.5 


 


RDW  14.4 


 


Plt Count  175 


 


MPV  7.4 L 


 


Neutrophils %  59.9 


 


Lymphocytes %  23.3  D 


 


Monocytes %  13.7 H 


 


Eosinophils %  2.4 


 


Basophils %  0.7 


 


PT with INR  


 


INR  


 


PTT (Actin FS)  


 


Sodium   141


 


Potassium   3.7


 


Chloride   106


 


Carbon Dioxide   26


 


Anion Gap   9


 


BUN   8


 


Creatinine   1.0


 


Creat Clearance w eGFR   > 60


 


Random Glucose   110 H


 


Calcium   7.8 L


 


Phosphorus   3.6


 


Magnesium   2.0


 


Total Bilirubin   0.4  D


 


AST   19


 


ALT   31


 


Alkaline Phosphatase   59


 


Troponin I   0.11 H


 


B-Natriuretic Peptide  


 


Total Protein   5.5 L


 


Albumin   2.7 L


 


Triglycerides   112


 


Cholesterol   95  D


 


Total LDL Cholesterol   42  D


 


HDL Cholesterol   51








Active Medications











Generic Name Dose Route Start Last Admin





  Trade Name Freq  PRN Reason Stop Dose Admin


 


Albuterol Sulfate  2 puff  04/06/18 23:57  





  Ventolin Hfa Inhaler -  IH   





  Q4H PRN   





  SHORT OF BREATH/WHEEZING   


 


Apixaban  5 mg  04/07/18 10:00  04/07/18 09:23





  Eliquis -  PO   5 mg





  BID MARCELO   Administration


 


Aspirin  81 mg  04/07/18 10:00  04/07/18 09:23





  Asa -  PO   81 mg





  DAILY MARCELO   Administration


 


Diltiazem HCl  120 mg  04/07/18 10:00  04/07/18 09:23





  Cardizem Cd -  PO   120 mg





  DAILY MARCELO   Administration


 


Docusate Sodium  200 mg  04/07/18 22:00  





  Colace -  PO   





  HS MARCELO   


 


Furosemide  40 mg  04/07/18 10:00  04/07/18 09:23





  Lasix -  PO   40 mg





  DAILY MARCELO   Administration


 


Levothyroxine Sodium  88 mcg  04/07/18 07:00  





  Synthroid -  PO   





  AM MARCELO   


 


Melatonin  5 mg  04/07/18 22:00  





  Melatonin  PO   





  HS MARCELO   


 


Metoprolol Succinate  25 mg  04/07/18 22:00  





  Toprol Xl -  PO   





  HS MARCELO   


 


Morphine Sulfate  30 mg  04/07/18 10:00  04/07/18 09:24





  Ms Contin -  PO   30 mg





  BID MARCELO   Administration


 


Pantoprazole Sodium  40 mg  04/07/18 10:00  04/07/18 09:23





  Protonix -  PO   40 mg





  DAILY MARCELO   Administration


 


Ranolazine  500 mg  04/07/18 10:00  04/07/18 09:23





  Ranexa -  PO   500 mg





  BID MARCELO   Administration


 


Tamsulosin HCl  0.4 mg  04/07/18 08:30  04/07/18 09:23





  Flomax -  PO   0.4 mg





  DAILY@0830 MARCELO   Administration











ASSESSMENT/PLAN:


initial presentation for chest pain, however now having a new abdominal pain 

concern for GI pathology


order CTAP and have GI evaluation





chest pain - improving


follow up cardiology recommendations currently on toprol XL 25, Cardizem 120 

daily, Ranexa, aspirin, and lasix


on MS contin





COPD, ILD


was resting comfortably, does not appear to be in COPD exacerbation at this time


continue albuterol prn





DVT, PE, s/p IVC filter - CTA negative for acute PE, continue eliquis





GERD - PPI








Visit type





- Emergency Visit


Emergency Visit: Yes


ED Registration Date: 04/06/18


Care time: The patient presented to the Emergency Department on the above date 

and was hospitalized for further evaluation of their emergent condition.





- New Patient


This patient is new to me today: Yes


Date on this admission: 04/07/18





- Critical Care


Critical Care patient: No

## 2018-04-08 LAB
ANION GAP SERPL CALC-SCNC: 7 MMOL/L (ref 8–16)
BASOPHILS # BLD: 0.8 % (ref 0–2)
BUN SERPL-MCNC: 8 MG/DL (ref 7–18)
CALCIUM SERPL-MCNC: 8.3 MG/DL (ref 8.5–10.1)
CHLORIDE SERPL-SCNC: 104 MMOL/L (ref 98–107)
CO2 SERPL-SCNC: 29 MMOL/L (ref 21–32)
CREAT SERPL-MCNC: 0.9 MG/DL (ref 0.7–1.3)
DEPRECATED RDW RBC AUTO: 14.3 % (ref 11.9–15.9)
EOSINOPHIL # BLD: 2.8 % (ref 0–4.5)
GLUCOSE SERPL-MCNC: 106 MG/DL (ref 74–106)
HCT VFR BLD CALC: 37.2 % (ref 35.4–49)
HGB BLD-MCNC: 12.5 GM/DL (ref 11.7–16.9)
LYMPHOCYTES # BLD: 21.6 % (ref 8–40)
MCH RBC QN AUTO: 31.7 PG (ref 25.7–33.7)
MCHC RBC AUTO-ENTMCNC: 33.7 G/DL (ref 32–35.9)
MCV RBC: 94.3 FL (ref 80–96)
MONOCYTES # BLD AUTO: 14.6 % (ref 3.8–10.2)
NEUTROPHILS # BLD: 60.2 % (ref 42.8–82.8)
PLATELET # BLD AUTO: 178 K/MM3 (ref 134–434)
PMV BLD: 7.5 FL (ref 7.5–11.1)
POTASSIUM SERPLBLD-SCNC: 3.5 MMOL/L (ref 3.5–5.1)
RBC # BLD AUTO: 3.95 M/MM3 (ref 4–5.6)
SODIUM SERPL-SCNC: 140 MMOL/L (ref 136–145)
WBC # BLD AUTO: 4.8 K/MM3 (ref 4–10)

## 2018-04-08 RX ADMIN — LEVOTHYROXINE SODIUM SCH MCG: 88 TABLET ORAL at 06:39

## 2018-04-08 RX ADMIN — POLYETHYLENE GLYCOL 3350 SCH GM: 17 POWDER, FOR SOLUTION ORAL at 09:08

## 2018-04-08 RX ADMIN — APIXABAN SCH MG: 5 TABLET, FILM COATED ORAL at 09:07

## 2018-04-08 RX ADMIN — Medication SCH MG: at 21:06

## 2018-04-08 RX ADMIN — APIXABAN SCH MG: 5 TABLET, FILM COATED ORAL at 21:06

## 2018-04-08 RX ADMIN — TAMSULOSIN HYDROCHLORIDE SCH MG: 0.4 CAPSULE ORAL at 09:07

## 2018-04-08 RX ADMIN — MORPHINE SULFATE SCH MG: 30 TABLET, EXTENDED RELEASE ORAL at 09:06

## 2018-04-08 RX ADMIN — DOCUSATE SODIUM SCH: 100 CAPSULE, LIQUID FILLED ORAL at 21:10

## 2018-04-08 RX ADMIN — ASPIRIN 81 MG SCH MG: 81 TABLET ORAL at 09:07

## 2018-04-08 RX ADMIN — MORPHINE SULFATE SCH MG: 30 TABLET, EXTENDED RELEASE ORAL at 21:06

## 2018-04-08 RX ADMIN — Medication SCH MG: at 22:58

## 2018-04-08 RX ADMIN — FUROSEMIDE SCH MG: 40 TABLET ORAL at 09:07

## 2018-04-08 RX ADMIN — DOCUSATE SODIUM SCH MG: 100 CAPSULE, LIQUID FILLED ORAL at 21:05

## 2018-04-08 RX ADMIN — PANTOPRAZOLE SODIUM SCH MG: 40 TABLET, DELAYED RELEASE ORAL at 09:07

## 2018-04-08 RX ADMIN — RANOLAZINE SCH MG: 500 TABLET, FILM COATED, EXTENDED RELEASE ORAL at 21:06

## 2018-04-08 RX ADMIN — RANOLAZINE SCH MG: 500 TABLET, FILM COATED, EXTENDED RELEASE ORAL at 09:07

## 2018-04-08 NOTE — PN
Physical Exam: 


SUBJECTIVE: Patient seen and examined at the bedside.





No chest pain, less abdominal pain.








OBJECTIVE:


for ct abdmen pelvis to further evaluate GI symptoms


 Vital Signs











 Period  Temp  Pulse  Resp  BP Sys/Abel  Pulse Ox


 


 Last 24 Hr  97.2 F-98.3 F  59-90  18-20  109-119/45-62  97-99








GENERAL: The patient is awake, alert, and fully oriented, in no acute distress.


HEAD: Normal with no signs of trauma.


EYES: PERRL, extraocular movements intact, sclera anicteric, conjunctiva clear. 

No ptosis. 


ENT: Ears normal, nares patent, oropharynx clear without exudates, moist mucous 

membranes.


NECK: Trachea midline, full range of motion, supple. 


LUNGS: Breath sounds equal, clear to auscultation bilaterally, no wheezes, no 

crackles,


HEART: Regular rate and rhythm


ABDOMEN: Soft, nontender, nondistended, normoactive bowel sounds, no guarding, 

no 


rebound, no hepatosplenomegaly, no masses.


EXTREMITIES: 2+ pulses, warm, well-perfused, no edema. 


NEUROLOGICAL: Normal speech, gait not observed.


PSYCH: Normal mood, normal affect.


SKIN: Warm, dry, normal turgor, no rashes or lesions noted


 Laboratory Results - last 24 hr











  04/08/18 04/08/18





  06:00 06:00


 


WBC  4.8 


 


RBC  3.95 L 


 


Hgb  12.5 


 


Hct  37.2 


 


MCV  94.3 


 


MCH  31.7 


 


MCHC  33.7 


 


RDW  14.3 


 


Plt Count  178 


 


MPV  7.5 


 


Neutrophils %  60.2 


 


Lymphocytes %  21.6 


 


Monocytes %  14.6 H 


 


Eosinophils %  2.8 


 


Basophils %  0.8 


 


Sodium   140


 


Potassium   3.5


 


Chloride   104


 


Carbon Dioxide   29


 


Anion Gap   7 L


 


BUN   8


 


Creatinine   0.9


 


Random Glucose   106


 


Calcium   8.3 L


 


TSH   1.29  D








Active Medications











Generic Name Dose Route Start Last Admin





  Trade Name Freq  PRN Reason Stop Dose Admin


 


Albuterol Sulfate  2 puff  04/06/18 23:57  





  Ventolin Hfa Inhaler -  IH   





  Q4H PRN   





  SHORT OF BREATH/WHEEZING   


 


Apixaban  5 mg  04/07/18 10:00  04/08/18 09:07





  Eliquis -  PO   5 mg





  BID MARCELO   Administration


 


Aspirin  81 mg  04/07/18 10:00 04/08/18 09:07





  Asa -  PO   81 mg





  DAILY MARCELO   Administration


 


Diltiazem HCl  120 mg  04/07/18 10:00  04/08/18 09:07





  Cardizem Cd -  PO   120 mg





  DAILY MARCELO   Administration


 


Docusate Sodium  200 mg  04/07/18 22:00  04/07/18 21:50





  Colace -  PO   200 mg





  HS MARCELO   Administration


 


Furosemide  40 mg  04/07/18 10:00  04/08/18 09:07





  Lasix -  PO   40 mg





  DAILY MARCELO   Administration


 


Levothyroxine Sodium  88 mcg  04/07/18 07:00  04/08/18 06:39





  Synthroid -  PO   88 mcg





  AM MARCELO   Administration


 


Melatonin  5 mg  04/07/18 22:00  04/07/18 21:51





  Melatonin  PO   5 mg





  HS MARCELO   Administration


 


Metoprolol Succinate  25 mg  04/07/18 22:00  04/07/18 21:50





  Toprol Xl -  PO   25 mg





  HS MARCELO   Administration


 


Morphine Sulfate  30 mg  04/07/18 10:00  04/08/18 09:06





  Ms Contin -  PO   30 mg





  BID MARCELO   Administration


 


Pantoprazole Sodium  40 mg  04/07/18 10:00  04/08/18 09:07





  Protonix -  PO   40 mg





  DAILY MARCELO   Administration


 


Polyethylene Glycol  17 gm  04/08/18 10:00  04/08/18 09:08





  Miralax (For Daily Use) -  PO   17 gm





  DAILY MARCELO   Administration


 


Ranolazine  500 mg  04/07/18 10:00  04/08/18 09:07





  Ranexa -  PO   500 mg





  BID MARCELO   Administration


 


Tamsulosin HCl  0.4 mg  04/07/18 08:30  04/08/18 09:07





  Flomax -  PO   0.4 mg





  DAILY@0830 MARCELO   Administration











ASSESSMENT/PLAN:





Patient is a 77 year old male with a significant past medical history of CVA, 

CAD s/p stents x 2, factor V leiden, diverticulitis, DVT (on Elliquis 5mg BID), 

GERD, kidney stones, MI, hypertension and COPD (home oxygen dependent)   





Card:


Chest pain on admission, now with new abdominal pain


CTAP ordered and pending


GI following, notes reviewed


Chest pain improved


Cardiology following, patient on Toprol XL 25, Cardizem 120 daily, Ranexa, ASA, 

Lasix





CAD s/p stents x 2


DVT history


On Eliquis 5mg BID





Hypertension, chronic





Pulmonary


COPD, hx, but not in exacerbation


On supplemental oxygen @ 2-3 liters


Maintain oxygen saturations 90% or greater





Muscular/Skeletal


Back pain, generalized pain, chronic


will continue his home regimen as pain is musculoskeletal


MS contin BID





Hematology:


Factor 5 leiden


PE, DVT history


IVC filter


On Eliquis 5mg BID





F.E.N.


Fluids: Po adequate


Electrolytes: monitor


Nutrition: low sodium diet





Prophy:


On Eliquis





Disposition:  full code. 








Visit type





- Emergency Visit


Emergency Visit: Yes


ED Registration Date: 04/06/18


Care time: The patient presented to the Emergency Department on the above date 

and was hospitalized for further evaluation of their emergent condition.





- New Patient


This patient is new to me today: Yes


Date on this admission: 04/08/18





- Critical Care


Critical Care patient: No





- Discharge Referral


Referred to [facility] No

## 2018-04-08 NOTE — PN
Progress Note, Physician


Chief Complaint: 





Events noted


Dyspnea persists


Complains of diffuse abdominal discomfort with palpation, but better





History of Present Illness: 





Patient was seen and examined. Awake and alert. Chart was reviewed


GI input noted


Denies chest pain. Persistent shortness of breath


Less abdominal discomfort





- Current Medication List


Current Medications: 


Active Medications





Albuterol Sulfate (Ventolin Hfa Inhaler -)  2 puff IH Q4H PRN


   PRN Reason: SHORT OF BREATH/WHEEZING


Apixaban (Eliquis -)  5 mg PO BID [facility]


   Last Admin: 04/08/18 09:07 Dose:  5 mg


Aspirin (Asa -)  81 mg PO DAILY [facility]


   Last Admin: 04/08/18 09:07 Dose:  81 mg


Diltiazem HCl (Cardizem Cd -)  120 mg PO DAILY [facility]


   Last Admin: 04/08/18 09:07 Dose:  120 mg


Docusate Sodium (Colace -)  200 mg PO HS [facility]


   Last Admin: 04/07/18 21:50 Dose:  200 mg


Furosemide (Lasix -)  40 mg PO DAILY [facility]


   Last Admin: 04/08/18 09:07 Dose:  40 mg


Levothyroxine Sodium (Synthroid -)  88 mcg PO AM [facility]


   Last Admin: 04/08/18 06:39 Dose:  88 mcg


Melatonin (Melatonin)  5 mg PO HS [facility]


   Last Admin: 04/07/18 21:51 Dose:  5 mg


Metoprolol Succinate (Toprol Xl -)  25 mg PO HS [facility]


   Last Admin: 04/07/18 21:50 Dose:  25 mg


Morphine Sulfate (Ms Contin -)  30 mg PO BID [facility]


   Last Admin: 04/08/18 09:06 Dose:  30 mg


Pantoprazole Sodium (Protonix -)  40 mg PO DAILY [facility]


   Last Admin: 04/08/18 09:07 Dose:  40 mg


Polyethylene Glycol (Miralax (For Daily Use) -)  17 gm PO DAILY [facility]


   Last Admin: 04/08/18 09:08 Dose:  17 gm


Ranolazine (Ranexa -)  500 mg PO BID [facility]


   Last Admin: 04/08/18 09:07 Dose:  500 mg


Tamsulosin HCl (Flomax -)  0.4 mg PO DAILY@0830 [facility]


   Last Admin: 04/08/18 09:07 Dose:  0.4 mg











- Objective


Vital Signs: 


 Vital Signs











Temperature  97.6 F   04/08/18 05:56


 


Pulse Rate  62   04/08/18 05:56


 


Respiratory Rate  20   04/08/18 05:56


 


Blood Pressure  109/49   04/08/18 05:56


 


O2 Sat by Pulse Oximetry (%)  99   04/08/18 05:56











HENT: Yes: Atraumatic


Neck: Yes: Supple


Cardiovascular: Yes: Regular Rate and Rhythm, S1, S2


Respiratory: Yes: Diminished


Gastrointestinal: Yes: Normal Bowel Sounds, Tenderness (Diffuse)


Edema: No


Additional Findings/Remarks: 








- Review of Systems


Constitutional: denies: Chills, Fever


Cardiovascular: (+) chest pain, (+) SOB. (-) palpitation


Respiratory: reports: Cough and sputum Production


Gastrointestinal: denies: Nausea, Vomiting, Diarrhea, Constipation, (+) 

Abdominal Pain


Musculoskeletal: denies: Joint Pain


Neurological: denies: Dizziness or Headaches








Labs: 


 CBC, BMP





 04/08/18 06:00 





 04/08/18 06:00 





 INR, PTT











INR  1.50  (0.82-1.09)  H  04/06/18  17:30    














- ....Imaging


X-ray: Report Reviewed (AXR noted)


Cat Scan: Pending





Problem List





- Problems


(1) Coronary artery disease


Code(s): I25.10 - ATHSCL HEART DISEASE OF NATIVE CORONARY ARTERY W/O ANG PCTRS 

  


Qualifiers: 


   Coronary Disease-Associated Artery/Lesion type: native artery   Native vs. 

transplanted heart: native heart   Associated angina: without angina   

Qualified Code(s): I25.10 - Atherosclerotic heart disease of native coronary 

artery without angina pectoris   





(2) S/P coronary artery stent placement


Code(s): Z95.5 - PRESENCE OF CORONARY ANGIOPLASTY IMPLANT AND GRAFT   





(3) SOB (shortness of breath)


Code(s): R06.02 - SHORTNESS OF BREATH   





(4) COPD (chronic obstructive pulmonary disease)


Code(s): J44.9 - CHRONIC OBSTRUCTIVE PULMONARY DISEASE, UNSPECIFIED   


Qualifiers: 


   COPD type: COPD with acute exacerbation   Qualified Code(s): J44.1 - Chronic 

obstructive pulmonary disease with (acute) exacerbation   





(5) CVA (cerebral vascular accident)


Code(s): I63.9 - CEREBRAL INFARCTION, UNSPECIFIED   


Qualifiers: 


   CVA mechanism: unspecified   Qualified Code(s): I63.9 - Cerebral infarction, 

unspecified   





(6) Chest pain


Code(s): R07.9 - CHEST PAIN, UNSPECIFIED   


Qualifiers: 


   Chest pain type: unspecified   Qualified Code(s): R07.9 - Chest pain, 

unspecified   





(7) Chronic abdominal pain


Code(s): R10.9 - UNSPECIFIED ABDOMINAL PAIN; G89.29 - OTHER CHRONIC PAIN   





(8) Factor V deficiency


Code(s): D68.2 - HEREDITARY DEFICIENCY OF OTHER CLOTTING FACTORS   





(9) HTN (hypertension)


Code(s): I10 - ESSENTIAL (PRIMARY) HYPERTENSION   


Qualifiers: 


   Hypertension type: essential hypertension   Qualified Code(s): I10 - 

Essential (primary) hypertension   





(10) Hypercholesteremia


Code(s): E78.0 - PURE HYPERCHOLESTEROLEMIA * DO NOT USE *   





(11) Hypothyroid


Code(s): E03.9 - HYPOTHYROIDISM, UNSPECIFIED   


Qualifiers: 


   Hypothyroidism type: due to acquired atrophy of thyroid   Qualified Code(s): 

E03.4 - Atrophy of thyroid (acquired)   





(12) Demand ischemia


Code(s): I24.8 - OTHER FORMS OF ACUTE ISCHEMIC HEART DISEASE   





Assessment/Plan





1. Dyspnea referable to COPD and ILD 


2. CAD with multivessel PCI, angina pectoris


3. Factor V Leiden with history of DVT and PE post IVC filter and on NOAC


4. Cerebrovascular disease


5. Hypertension


6. Hypercholesterolemia


7. Prostate CA


8. 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


9. Hypothyroidism


10. Abdominal pain, etiology unclear





PLAN:


1. Continue Cardizem  mg once a day


2. Continue Eliquis 5 mg BID and Ranexa 500 mg BID as tolerated. Diuretic with 

Lasix with caution


3. Await abdominal CT result. GI to follow 


4. Continue pulmonary management with IV steroids, bronchodilator and O2


5. Patient was given Metoprolol ER - with caution in view of presence of ILD 

and COPD


6. Continue ASA in view of multivessel CAD even though he is on NOAC





Guarded


Further plans are to follow


Severo Watson MD

## 2018-04-09 LAB
ALBUMIN SERPL-MCNC: 3.2 G/DL (ref 3.4–5)
ALP SERPL-CCNC: 72 U/L (ref 45–117)
ALT SERPL-CCNC: 34 U/L (ref 12–78)
ANION GAP SERPL CALC-SCNC: 6 MMOL/L (ref 8–16)
AST SERPL-CCNC: 16 U/L (ref 15–37)
BASOPHILS # BLD: 0.9 % (ref 0–2)
BILIRUB SERPL-MCNC: 0.6 MG/DL (ref 0.2–1)
BUN SERPL-MCNC: 6 MG/DL (ref 7–18)
CALCIUM SERPL-MCNC: 8.8 MG/DL (ref 8.5–10.1)
CHLORIDE SERPL-SCNC: 103 MMOL/L (ref 98–107)
CO2 SERPL-SCNC: 31 MMOL/L (ref 21–32)
CREAT SERPL-MCNC: 1.1 MG/DL (ref 0.7–1.3)
DEPRECATED RDW RBC AUTO: 14.7 % (ref 11.9–15.9)
EOSINOPHIL # BLD: 2.2 % (ref 0–4.5)
GLUCOSE SERPL-MCNC: 124 MG/DL (ref 74–106)
HCT VFR BLD CALC: 41.7 % (ref 35.4–49)
HGB BLD-MCNC: 14.1 GM/DL (ref 11.7–16.9)
LYMPHOCYTES # BLD: 23.3 % (ref 8–40)
MAGNESIUM SERPL-MCNC: 2.2 MG/DL (ref 1.8–2.4)
MCH RBC QN AUTO: 31.8 PG (ref 25.7–33.7)
MCHC RBC AUTO-ENTMCNC: 33.8 G/DL (ref 32–35.9)
MCV RBC: 94.1 FL (ref 80–96)
MONOCYTES # BLD AUTO: 11.6 % (ref 3.8–10.2)
NEUTROPHILS # BLD: 62 % (ref 42.8–82.8)
PLATELET # BLD AUTO: 205 K/MM3 (ref 134–434)
PMV BLD: 7.1 FL (ref 7.5–11.1)
POTASSIUM SERPLBLD-SCNC: 4 MMOL/L (ref 3.5–5.1)
PROT SERPL-MCNC: 6.3 G/DL (ref 6.4–8.2)
RBC # BLD AUTO: 4.44 M/MM3 (ref 4–5.6)
SODIUM SERPL-SCNC: 140 MMOL/L (ref 136–145)
WBC # BLD AUTO: 5.9 K/MM3 (ref 4–10)

## 2018-04-09 RX ADMIN — PANTOPRAZOLE SODIUM SCH MG: 40 TABLET, DELAYED RELEASE ORAL at 09:38

## 2018-04-09 RX ADMIN — RANOLAZINE SCH MG: 500 TABLET, FILM COATED, EXTENDED RELEASE ORAL at 21:18

## 2018-04-09 RX ADMIN — MORPHINE SULFATE SCH MG: 30 TABLET, EXTENDED RELEASE ORAL at 09:38

## 2018-04-09 RX ADMIN — FUROSEMIDE SCH MG: 40 TABLET ORAL at 14:34

## 2018-04-09 RX ADMIN — POLYETHYLENE GLYCOL 3350 SCH GM: 17 POWDER, FOR SOLUTION ORAL at 09:39

## 2018-04-09 RX ADMIN — TAMSULOSIN HYDROCHLORIDE SCH MG: 0.4 CAPSULE ORAL at 09:37

## 2018-04-09 RX ADMIN — DOCUSATE SODIUM SCH: 100 CAPSULE, LIQUID FILLED ORAL at 21:19

## 2018-04-09 RX ADMIN — APIXABAN SCH MG: 5 TABLET, FILM COATED ORAL at 21:19

## 2018-04-09 RX ADMIN — Medication SCH CAP: at 18:20

## 2018-04-09 RX ADMIN — MORPHINE SULFATE SCH MG: 30 TABLET, EXTENDED RELEASE ORAL at 21:18

## 2018-04-09 RX ADMIN — RANOLAZINE SCH MG: 500 TABLET, FILM COATED, EXTENDED RELEASE ORAL at 09:38

## 2018-04-09 RX ADMIN — ASPIRIN 81 MG SCH MG: 81 TABLET ORAL at 09:38

## 2018-04-09 RX ADMIN — APIXABAN SCH MG: 5 TABLET, FILM COATED ORAL at 09:38

## 2018-04-09 RX ADMIN — LEVOTHYROXINE SODIUM SCH MCG: 88 TABLET ORAL at 06:01

## 2018-04-09 RX ADMIN — Medication SCH MG: at 23:43

## 2018-04-09 NOTE — PN
Progress Note, Physician


Chief Complaint: 


Mr Oliva complains of abdominal and chest pain. No sob or n/v.





- Current Medication List


Current Medications: 


Active Medications





Albuterol Sulfate (Ventolin Hfa Inhaler -)  2 puff IH Q4H PRN


   PRN Reason: SHORT OF BREATH/WHEEZING


Apixaban (Eliquis -)  5 mg PO BID [facility]


   Last Admin: 04/09/18 09:38 Dose:  5 mg


Aspirin (Asa -)  81 mg PO DAILY [facility]


   Last Admin: 04/09/18 09:38 Dose:  81 mg


Diltiazem HCl (Cardizem Cd -)  120 mg PO DAILY [facility]


   Last Admin: 04/08/18 09:07 Dose:  120 mg


Docusate Sodium (Colace -)  200 mg PO [facility]


   Last Admin: 04/08/18 21:10 Dose:  Not Given


Furosemide (Lasix -)  40 mg PO DAILY [facility]


   Last Admin: 04/08/18 09:07 Dose:  40 mg


Levothyroxine Sodium (Synthroid -)  88 mcg PO AM [facility]


   Last Admin: 04/09/18 06:01 Dose:  88 mcg


Melatonin (Melatonin)  5 mg PO [facility]


   Last Admin: 04/08/18 22:58 Dose:  5 mg


Metoprolol Succinate (Toprol Xl -)  25 mg PO [facility]


   Last Admin: 04/08/18 21:06 Dose:  25 mg


Morphine Sulfate (Ms Contin -)  30 mg PO BID [facility]


   Last Admin: 04/09/18 09:38 Dose:  30 mg


Pantoprazole Sodium (Protonix -)  40 mg PO DAILY [facility]


   Last Admin: 04/09/18 09:38 Dose:  40 mg


Polyethylene Glycol (Miralax (For Daily Use) -)  17 gm PO DAILY [facility]


   Last Admin: 04/09/18 09:39 Dose:  17 gm


Ranolazine (Ranexa -)  500 mg PO BID [facility]


   Last Admin: 04/09/18 09:38 Dose:  500 mg


Tamsulosin HCl (Flomax -)  0.4 mg PO DAILY@0830 [facility]


   Last Admin: 04/09/18 09:37 Dose:  0.4 mg











- Objective


Vital Signs: 


 Vital Signs











Temperature  36.9 C   04/09/18 09:35


 


Pulse Rate  70   04/09/18 09:35


 


Respiratory Rate  22   04/09/18 10:50


 


Blood Pressure  99/43   04/09/18 09:35


 


O2 Sat by Pulse Oximetry (%)  94 L  04/09/18 10:50











Constitutional: Yes: No Distress, Calm, Obese


Cardiovascular: Yes: Regular Rate and Rhythm.  No: Gallop, Murmur, Rub


Respiratory: Yes: Regular, CTA Bilaterally.  No: Rales, Rhonchi, Wheezes


Gastrointestinal: Yes: Normal Bowel Sounds, Soft.  No: Distention, Tenderness


Extremities: Yes: WNL


Edema: No


Labs: 


 CBC, BMP





 04/09/18 12:30 





 04/09/18 12:30 





 INR, PTT











INR  1.50  (0.82-1.09)  H  04/06/18  17:30    














- ....Imaging


Cat Scan: Report Reviewed, Image Reviewed





Problem List





- Problems


(1) Abdominal pain


Assessment/Plan: 


-Dr Yoon and Dr Valenzuela's notes reviewed


-on exam note patient does not react to palpation of abdomen when distracted


-however when asked he says he has pain with slight guarding


-CT scan read reviewed, no acute pathology


-will start high fiber diet and probiotics


-continue miralax


Code(s): R10.9 - UNSPECIFIED ABDOMINAL PAIN   


Qualifiers: 


   Abdominal location: generalized   Qualified Code(s): R10.84 - Generalized 

abdominal pain   





(2) Coronary artery disease


Assessment/Plan: 


-chest pain does not appear cardiac in nature


-cardiology note reviewed


-continue current regimen


-continue telemetry


Code(s): I25.10 - ATHSCL HEART DISEASE OF NATIVE CORONARY ARTERY W/O ANG PCTRS 

  


Qualifiers: 


   Coronary Disease-Associated Artery/Lesion type: native artery   Native vs. 

transplanted heart: native heart   Associated angina: without angina   

Qualified Code(s): I25.10 - Atherosclerotic heart disease of native coronary 

artery without angina pectoris   





(3) COPD (chronic obstructive pulmonary disease)


Assessment/Plan: 


-not in exacerbation


-continue albuterol


Code(s): J44.9 - CHRONIC OBSTRUCTIVE PULMONARY DISEASE, UNSPECIFIED   


Qualifiers: 


   COPD type: COPD with acute exacerbation   Qualified Code(s): J44.1 - Chronic 

obstructive pulmonary disease with (acute) exacerbation   





(4) CVA (cerebral vascular accident)


Assessment/Plan: 


-on aspirin and eliquis


Code(s): I63.9 - CEREBRAL INFARCTION, UNSPECIFIED   


Qualifiers: 


   CVA mechanism: unspecified   Qualified Code(s): I63.9 - Cerebral infarction, 

unspecified   





(5) Constipation


Assessment/Plan: 


-on miralax


Code(s): K59.00 - CONSTIPATION, UNSPECIFIED   





(6) GERD (gastroesophageal reflux disease)


Assessment/Plan: 


-continue protonix


Code(s): K21.9 - GASTRO-ESOPHAGEAL REFLUX DISEASE WITHOUT ESOPHAGITIS   





(7) HTN (hypertension)


Assessment/Plan: 


-controlled


-monitor


Code(s): I10 - ESSENTIAL (PRIMARY) HYPERTENSION   


Qualifiers: 


   Hypertension type: essential hypertension   Qualified Code(s): I10 - 

Essential (primary) hypertension   





(8) Hypothyroid


Assessment/Plan: 


-continue levothyroxine


Code(s): E03.9 - HYPOTHYROIDISM, UNSPECIFIED   


Qualifiers: 


   Hypothyroidism type: due to acquired atrophy of thyroid   Qualified Code(s): 

E03.4 - Atrophy of thyroid (acquired)

## 2018-04-09 NOTE — PN
Progress Note, Physician


Chief Complaint: 





Events noted


Dyspnea persists


Complains of diffuse abdominal discomfort with palpation


Abdominal CT official result pending





History of Present Illness: 





Patient was seen and examined. Awake and alert. Chart was reviewed


Denies chest pain. Persistent shortness of breath


Persistent abdominal discomfort, etiology to be determined





- Current Medication List


Current Medications: 


Active Medications





Albuterol Sulfate (Ventolin Hfa Inhaler -)  2 puff IH Q4H PRN


   PRN Reason: SHORT OF BREATH/WHEEZING


Apixaban (Eliquis -)  [facility]ID [facility]


   Last Admin: 04/09/18 09:38 Dose:  5 mg


Aspirin (Asa -)  81 mg PO DAILY [facility]


   Last Admin: 04/09/18 09:38 Dose:  81 mg


Diltiazem HCl (Cardizem Cd -)  120 mg PO DAILY [facility]


   Last Admin: 04/08/18 09:07 Dose:  120 mg


Docusate Sodium (Colace -)  200 mg PO HS [facility]


   Last Admin: 04/08/18 21:10 Dose:  Not Given


Furosemide (Lasix -)  40 mg PO DAILY [facility]


   Last Admin: 04/08/18 09:07 Dose:  40 mg


Levothyroxine Sodium (Synthroid -)  88 mcg PO AM [facility]


   Last Admin: 04/09/18 06:01 Dose:  88 mcg


Melatonin (Melatonin)  5 mg PO HS [facility]


   Last Admin: 04/08/18 22:58 Dose:  5 mg


Metoprolol Succinate (Toprol Xl -)  25 mg PO HS [facility]


   Last Admin: 04/08/18 21:06 Dose:  25 mg


Morphine Sulfate (Ms Contin -)  30 mg PO BID [facility]


   Last Admin: 04/09/18 09:38 Dose:  30 mg


Pantoprazole Sodium (Protonix -)  40 mg PO DAILY [facility]


   Last Admin: 04/09/18 09:38 Dose:  40 mg


Polyethylene Glycol (Miralax (For Daily Use) -)  17 gm PO DAILY [facility]


   Last Admin: 04/09/18 09:39 Dose:  17 gm


Ranolazine (Ranexa -)  500 mg PO BID [facility]


   Last Admin: 04/09/18 09:38 Dose:  500 mg


Tamsulosin HCl (Flomax -)  0.4 mg PO DAILY@0830 [facility]


   Last Admin: 04/09/18 09:37 Dose:  0.4 mg











- Objective


Vital Signs: 


 Vital Signs











Temperature  98.4 F   04/09/18 09:35


 


Pulse Rate  70   04/09/18 09:35


 


Respiratory Rate  22   04/09/18 09:35


 


Blood Pressure  99/43   04/09/18 09:35


 


O2 Sat by Pulse Oximetry (%)  97   04/08/18 19:52











Eyes: Yes: PERRL


HENT: Yes: Atraumatic


Neck: Yes: Supple


Cardiovascular: Yes: Regular Rate and Rhythm, S1, S2


Respiratory: Yes: Diminished


Gastrointestinal: Yes: Soft, Tenderness (mid center area)


Edema: No


Additional Findings/Remarks: 








- Review of Systems


Constitutional: denies: Chills, Fever


Cardiovascular: (+) chest pain, (+) SOB. (-) palpitation


Respiratory: reports: Cough and sputum Production


Gastrointestinal: denies: Nausea, Vomiting, Diarrhea, Constipation, (+) 

Abdominal Pain


Musculoskeletal: denies: Joint Pain


Neurological: denies: Dizziness or Headaches








Labs: 


 CBC, BMP





 04/08/18 06:00 





 04/08/18 06:00 





 INR, PTT











INR  1.50  (0.82-1.09)  H  04/06/18  17:30    














Problem List





- Problems


(1) Coronary artery disease


Code(s): I25.10 - ATHSCL HEART DISEASE OF NATIVE CORONARY ARTERY W/O ANG PCTRS 

  


Qualifiers: 


   Coronary Disease-Associated Artery/Lesion type: native artery   Native vs. 

transplanted heart: native heart   Associated angina: without angina   

Qualified Code(s): I25.10 - Atherosclerotic heart disease of native coronary 

artery without angina pectoris   





(2) S/P coronary artery stent placement


Code(s): Z95.5 - PRESENCE OF CORONARY ANGIOPLASTY IMPLANT AND GRAFT   





(3) SOB (shortness of breath)


Code(s): R06.02 - SHORTNESS OF BREATH   





(4) COPD (chronic obstructive pulmonary disease)


Code(s): J44.9 - CHRONIC OBSTRUCTIVE PULMONARY DISEASE, UNSPECIFIED   


Qualifiers: 


   COPD type: COPD with acute exacerbation   Qualified Code(s): J44.1 - Chronic 

obstructive pulmonary disease with (acute) exacerbation   





(5) CVA (cerebral vascular accident)


Code(s): I63.9 - CEREBRAL INFARCTION, UNSPECIFIED   


Qualifiers: 


   CVA mechanism: unspecified   Qualified Code(s): I63.9 - Cerebral infarction, 

unspecified   





(6) Chest pain


Code(s): R07.9 - CHEST PAIN, UNSPECIFIED   


Qualifiers: 


   Chest pain type: unspecified   Qualified Code(s): R07.9 - Chest pain, 

unspecified   





(7) Chronic abdominal pain


Code(s): R10.9 - UNSPECIFIED ABDOMINAL PAIN; G89.29 - OTHER CHRONIC PAIN   





(8) Factor V deficiency


Code(s): D68.2 - HEREDITARY DEFICIENCY OF OTHER CLOTTING FACTORS   





(9) HTN (hypertension)


Code(s): I10 - ESSENTIAL (PRIMARY) HYPERTENSION   


Qualifiers: 


   Hypertension type: essential hypertension   Qualified Code(s): I10 - 

Essential (primary) hypertension   





(10) Hypercholesteremia


Code(s): E78.0 - PURE HYPERCHOLESTEROLEMIA * DO NOT USE *   





(11) Hypothyroid


Code(s): E03.9 - HYPOTHYROIDISM, UNSPECIFIED   


Qualifiers: 


   Hypothyroidism type: due to acquired atrophy of thyroid   Qualified Code(s): 

E03.4 - Atrophy of thyroid (acquired)   





(12) Demand ischemia


Code(s): I24.8 - OTHER FORMS OF ACUTE ISCHEMIC HEART DISEASE   





Assessment/Plan





1. Dyspnea referable to COPD and ILD 


2. CAD with multivessel PCI, angina pectoris


3. Factor V Leiden with history of DVT and PE post IVC filter and on NOAC


4. Cerebrovascular disease


5. Hypertension


6. Hypercholesterolemia


7. Prostate CA


8. 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


9. Hypothyroidism


10. Abdominal pain, etiology unclear





PLAN:


1. Continue Cardizem  mg once a day


2. Continue Eliquis 5 mg BID and Ranexa 500 mg BID as tolerated. Diuretic with 

Lasix with caution


3. Await official abdominal CT result. GI to follow for assessing etiology of 

abdominal discomfort


4. Continue pulmonary management with IV steroids, bronchodilator and O2


5. Patient was given Metoprolol ER - with caution in view of presence of ILD 

and COPD


6. Continue ASA in view of multivessel CAD even though he is on NOAC





Guarded


Further plans are to follow


Severo Watson MD

## 2018-04-09 NOTE — PN
Progress Note, Physician


History of Present Illness: 





No events. c/o generalized abdominal pain with emphasis on LLQ. CT a/p with 

contrast no GI pathology. Also c/o chronic constipation, which apparently 

responds well to probiotics.





- Current Medication List


Current Medications: 


Active Medications





Albuterol Sulfate (Ventolin Hfa Inhaler -)  2 puff IH Q4H PRN


   PRN Reason: SHORT OF BREATH/WHEEZING


Apixaban (Eliquis -)  [facility]ID [facility]


   Last Admin: 04/09/18 09:38 Dose:  5 mg


Aspirin (Asa -)  81 mg PO DAILY [facility]


   Last Admin: 04/09/18 09:38 Dose:  81 mg


Diltiazem HCl (Cardizem Cd -)  120 mg PO DAILY [facility]


   Last Admin: 04/08/18 09:07 Dose:  120 mg


Docusate Sodium (Colace -)  200 mg PO [facility]


   Last Admin: 04/08/18 21:10 Dose:  Not Given


Furosemide (Lasix -)  40 mg PO DAILY [facility]


   Last Admin: 04/08/18 09:07 Dose:  40 mg


Levothyroxine Sodium (Synthroid -)  88 mcg PO AM [facility]


   Last Admin: 04/09/18 06:01 Dose:  88 mcg


Melatonin (Melatonin)  5 mg PO HS [facility]


   Last Admin: 04/08/18 22:58 Dose:  5 mg


Metoprolol Succinate (Toprol Xl -)  25 mg PO HS [facility]


   Last Admin: 04/08/18 21:06 Dose:  25 mg


Morphine Sulfate (Ms Contin -)  30 mg PO BID [facility]


   Last Admin: 04/09/18 09:38 Dose:  30 mg


Pantoprazole Sodium (Protonix -)  40 mg PO DAILY [facility]


   Last Admin: 04/09/18 09:38 Dose:  40 mg


Polyethylene Glycol (Miralax (For Daily Use) -)  17 gm PO DAILY [facility]


   Last Admin: 04/09/18 09:39 Dose:  17 gm


Ranolazine (Ranexa -)  500 mg PO BID [facility]


   Last Admin: 04/09/18 09:38 Dose:  500 mg


Tamsulosin HCl (Flomax -)  0.4 mg PO DAILY@0830 [facility]


   Last Admin: 04/09/18 09:37 Dose:  0.4 mg











- Objective


Vital Signs: 


 Vital Signs











Temperature  98.4 F   04/09/18 09:35


 


Pulse Rate  70   04/09/18 09:35


 


Respiratory Rate  22   04/09/18 10:50


 


Blood Pressure  99/43   04/09/18 09:35


 


O2 Sat by Pulse Oximetry (%)  94 L  04/09/18 10:50











Constitutional: Yes: Well Nourished, No Distress, Calm


Eyes: Yes: Conjunctiva Clear


HENT: Yes: Atraumatic


Neck: Yes: Supple


Cardiovascular: Yes: Regular Rate and Rhythm.  No: Tachycardia, Pulse Irregular


Respiratory: Yes: Regular


Gastrointestinal: Yes: Normal Bowel Sounds, Soft, Abdomen, Obese, Tenderness (

LLQ on deep palpation).  No: Ascites, Distention, Melena, Palpable Mass, 

Pulsatile Mass, Vomiting


Neurological: Yes: Alert, Oriented


Labs: 


 CBC, BMP





 04/09/18 12:30 





 04/09/18 12:30 





 INR, PTT











INR  1.50  (0.82-1.09)  H  04/06/18  17:30    








 Laboratory Last Values











WBC  5.9 K/mm3 (4.0-10.0)   04/09/18  12:30    


 


RBC  4.44 M/mm3 (4.00-5.60)   04/09/18  12:30    


 


Hgb  14.1 GM/dL (11.7-16.9)  D 04/09/18  12:30    


 


Hct  41.7 % (35.4-49)   04/09/18  12:30    


 


MCV  94.1 fl (80-96)   04/09/18  12:30    


 


MCH  31.8 pg (25.7-33.7)   04/09/18  12:30    


 


MCHC  33.8 g/dl (32.0-35.9)   04/09/18  12:30    


 


RDW  14.7 % (11.9-15.9)   04/09/18  12:30    


 


Plt Count  205 K/MM3 (134-434)   04/09/18  12:30    


 


MPV  7.1 fl (7.5-11.1)  L  04/09/18  12:30    


 


Neutrophils %  62.0 % (42.8-82.8)   04/09/18  12:30    


 


Lymphocytes %  23.3 % (8-40)   04/09/18  12:30    


 


Monocytes %  11.6 % (3.8-10.2)  H  04/09/18  12:30    


 


Eosinophils %  2.2 % (0-4.5)   04/09/18  12:30    


 


Basophils %  0.9 % (0-2.0)   04/09/18  12:30    


 


PT with INR  16.90 SEC (9.98-11.88)  H  04/06/18  17:30    


 


INR  1.50  (0.82-1.09)  H  04/06/18  17:30    


 


PTT (Actin FS)  30.0 SECONDS (26.9-34.4)   04/06/18  17:30    


 


Sodium  140 mmol/L (136-145)   04/09/18  12:30    


 


Potassium  4.0 mmol/L (3.5-5.1)   04/09/18  12:30    


 


Chloride  103 mmol/L ()   04/09/18  12:30    


 


Carbon Dioxide  31 mmol/L (21-32)   04/09/18  12:30    


 


Anion Gap  6  (8-16)  L  04/09/18  12:30    


 


BUN  6 mg/dL (7-18)  L D 04/09/18  12:30    


 


Creatinine  1.1 mg/dL (0.7-1.3)  D 04/09/18  12:30    


 


Creat Clearance w eGFR  > 60  (>60)   04/09/18  12:30    


 


Random Glucose  124 mg/dL ()  H  04/09/18  12:30    


 


Calcium  8.8 mg/dL (8.5-10.1)   04/09/18  12:30    


 


Phosphorus  3.6 mg/dL (2.5-4.9)   04/07/18  05:00    


 


Magnesium  2.2 mg/dL (1.8-2.4)   04/09/18  12:30    


 


Total Bilirubin  0.6 mg/dL (0.2-1.0)  D 04/09/18  12:30    


 


AST  16 U/L (15-37)   04/09/18  12:30    


 


ALT  34 U/L (12-78)   04/09/18  12:30    


 


Alkaline Phosphatase  72 U/L ()  D 04/09/18  12:30    


 


Troponin I  0.11 ng/ml (0.00-0.05)  H  04/07/18  05:00    


 


B-Natriuretic Peptide  265.04 pg/ml (5-450)   04/06/18  17:30    


 


Total Protein  6.3 g/dl (6.4-8.2)  L  04/09/18  12:30    


 


Albumin  3.2 g/dl (3.4-5.0)  L  04/09/18  12:30    


 


Triglycerides  112 mg/dL ()   04/07/18  05:00    


 


Cholesterol  95 mg/dL ()  D 04/07/18  05:00    


 


Total LDL Cholesterol  42 mg/dL (5-100)  D 04/07/18  05:00    


 


HDL Cholesterol  51 mg/dL (40-60)   04/07/18  05:00    


 


TSH  1.29 uIU/ml (0.358-3.74)  D 04/08/18  06:00    














Problem List





- Problems


(1) Abdominal pain


Code(s): R10.9 - UNSPECIFIED ABDOMINAL PAIN   





Assessment/Plan





Non-specific, chronic abdominal pain in settings of ichronically altered 

bowels. ?Constipation-predominant IBS?. CT A/P with contrast is negative for 

acute GI pathology. Multiple imaging studies in pursuit of chronic abdominal 

pain dx have been unrevealing. Last colonoscopy 10 y ago, per patient. He is 

due for one now. It can be done on OP bases with his gastroenterologist





Trial of probiotics x2-4 weeks


Miralax bid


High fiber diet


will follow

## 2018-04-10 LAB
ANION GAP SERPL CALC-SCNC: 7 MMOL/L (ref 8–16)
BASOPHILS # BLD: 0.6 % (ref 0–2)
BUN SERPL-MCNC: 5 MG/DL (ref 7–18)
CALCIUM SERPL-MCNC: 8.2 MG/DL (ref 8.5–10.1)
CHLORIDE SERPL-SCNC: 105 MMOL/L (ref 98–107)
CO2 SERPL-SCNC: 30 MMOL/L (ref 21–32)
CREAT SERPL-MCNC: 1 MG/DL (ref 0.7–1.3)
DEPRECATED RDW RBC AUTO: 14.1 % (ref 11.9–15.9)
EOSINOPHIL # BLD: 2.3 % (ref 0–4.5)
GLUCOSE SERPL-MCNC: 98 MG/DL (ref 74–106)
HCT VFR BLD CALC: 37.6 % (ref 35.4–49)
HGB BLD-MCNC: 12.7 GM/DL (ref 11.7–16.9)
LYMPHOCYTES # BLD: 24.9 % (ref 8–40)
MAGNESIUM SERPL-MCNC: 2.2 MG/DL (ref 1.8–2.4)
MCH RBC QN AUTO: 32 PG (ref 25.7–33.7)
MCHC RBC AUTO-ENTMCNC: 33.8 G/DL (ref 32–35.9)
MCV RBC: 94.6 FL (ref 80–96)
MONOCYTES # BLD AUTO: 14.8 % (ref 3.8–10.2)
NEUTROPHILS # BLD: 57.4 % (ref 42.8–82.8)
PHOSPHATE SERPL-MCNC: 4 MG/DL (ref 2.5–4.9)
PLATELET # BLD AUTO: 181 K/MM3 (ref 134–434)
PMV BLD: 7.4 FL (ref 7.5–11.1)
POTASSIUM SERPLBLD-SCNC: 3.6 MMOL/L (ref 3.5–5.1)
RBC # BLD AUTO: 3.97 M/MM3 (ref 4–5.6)
SODIUM SERPL-SCNC: 142 MMOL/L (ref 136–145)
WBC # BLD AUTO: 4.9 K/MM3 (ref 4–10)

## 2018-04-10 RX ADMIN — ASPIRIN 81 MG SCH MG: 81 TABLET ORAL at 09:29

## 2018-04-10 RX ADMIN — MORPHINE SULFATE SCH MG: 30 TABLET, EXTENDED RELEASE ORAL at 09:27

## 2018-04-10 RX ADMIN — VALSARTAN SCH MG: 40 TABLET, FILM COATED ORAL at 15:00

## 2018-04-10 RX ADMIN — RANOLAZINE SCH MG: 500 TABLET, FILM COATED, EXTENDED RELEASE ORAL at 21:05

## 2018-04-10 RX ADMIN — Medication SCH CAP: at 09:28

## 2018-04-10 RX ADMIN — TAMSULOSIN HYDROCHLORIDE SCH MG: 0.4 CAPSULE ORAL at 09:28

## 2018-04-10 RX ADMIN — POLYETHYLENE GLYCOL 3350 SCH: 17 POWDER, FOR SOLUTION ORAL at 11:32

## 2018-04-10 RX ADMIN — DOCUSATE SODIUM SCH: 100 CAPSULE, LIQUID FILLED ORAL at 21:06

## 2018-04-10 RX ADMIN — LEVOTHYROXINE SODIUM SCH MCG: 88 TABLET ORAL at 06:03

## 2018-04-10 RX ADMIN — RANOLAZINE SCH MG: 500 TABLET, FILM COATED, EXTENDED RELEASE ORAL at 09:29

## 2018-04-10 RX ADMIN — FUROSEMIDE SCH MG: 40 TABLET ORAL at 09:28

## 2018-04-10 RX ADMIN — APIXABAN SCH MG: 5 TABLET, FILM COATED ORAL at 09:29

## 2018-04-10 RX ADMIN — APIXABAN SCH MG: 5 TABLET, FILM COATED ORAL at 21:05

## 2018-04-10 RX ADMIN — Medication SCH MG: at 22:33

## 2018-04-10 RX ADMIN — PANTOPRAZOLE SODIUM SCH MG: 40 TABLET, DELAYED RELEASE ORAL at 09:29

## 2018-04-10 RX ADMIN — MORPHINE SULFATE SCH MG: 30 TABLET, EXTENDED RELEASE ORAL at 21:05

## 2018-04-10 NOTE — PN
Progress Note, Physician


History of Present Illness: 





No events. C/o diarrhea this am, now better





- Current Medication List


Current Medications: 


Active Medications





Albuterol Sulfate (Ventolin Hfa Inhaler -)  2 puff IH Q4H PRN


   PRN Reason: SHORT OF BREATH/WHEEZING


Apixaban (Eliquis -)  5 mg PO BID [facility]


   Last Admin: 04/10/18 09:29 Dose:  5 mg


Aspirin (Asa -)  81 mg PO DAILY [facility]


   Last Admin: 04/10/18 09:29 Dose:  81 mg


Docusate Sodium (Colace -)  200 mg PO HS [facility]


   Last Admin: 04/09/18 21:19 Dose:  Not Given


Furosemide (Lasix -)  40 mg PO DAILY [facility]


   Last Admin: 04/10/18 09:28 Dose:  40 mg


Lactobacillus Acidophilus (Bacid -)  1 cap PO DAILY [facility]


   Last Admin: 04/10/18 09:28 Dose:  1 cap


Levothyroxine Sodium (Synthroid -)  88 mcg PO AM [facility]


   Last Admin: 04/10/18 06:03 Dose:  88 mcg


Melatonin (Melatonin)  5 mg PO HS [facility]


   Last Admin: 04/09/18 23:43 Dose:  5 mg


Metoprolol Succinate (Toprol Xl -)  50 mg PO HS [facility]


Morphine Sulfate (Ms Contin -)  30 mg PO BID [facility]


   Last Admin: 04/10/18 09:27 Dose:  30 mg


Pantoprazole Sodium (Protonix -)  40 mg PO DAILY [facility]


   Last Admin: 04/10/18 09:29 Dose:  40 mg


Ranolazine (Ranexa -)  500 mg PO BID [facility]


   Last Admin: 04/10/18 09:29 Dose:  500 mg


Simethicone (Mylicon -)  80 mg PO QID PRN


   PRN Reason: GAS


   Last Admin: 04/10/18 11:23 Dose:  80 mg


Tamsulosin HCl (Flomax -)  0.4 mg PO DAILY@0830 [facility]


   Last Admin: 04/10/18 09:28 Dose:  0.4 mg


Valsartan (Diovan -)  40 mg PO DAILY [facility]


   Last Admin: 04/10/18 15:00 Dose:  40 mg











- Objective


Vital Signs: 


 Vital Signs











Temperature  97.7 F   04/10/18 14:00


 


Pulse Rate  67   04/10/18 14:00


 


Respiratory Rate  18   04/10/18 12:00


 


Blood Pressure  112/56   04/10/18 14:00


 


O2 Sat by Pulse Oximetry (%)  96   04/10/18 12:00











Constitutional: Yes: Well Nourished, No Distress, Calm


Gastrointestinal: Yes: Soft.  No: Distention, Tenderness


Neurological: Yes: Alert


Labs: 


 CBC, BMP





 04/10/18 07:00 





 04/10/18 07:00 





 INR, PTT











INR  1.50  (0.82-1.09)  H  04/06/18  17:30    








 Laboratory Last Values











WBC  4.9 K/mm3 (4.0-10.0)   04/10/18  07:00    


 


RBC  3.97 M/mm3 (4.00-5.60)  L  04/10/18  07:00    


 


Hgb  12.7 GM/dL (11.7-16.9)   04/10/18  07:00    


 


Hct  37.6 % (35.4-49)   04/10/18  07:00    


 


MCV  94.6 fl (80-96)   04/10/18  07:00    


 


MCH  32.0 pg (25.7-33.7)   04/10/18  07:00    


 


MCHC  33.8 g/dl (32.0-35.9)   04/10/18  07:00    


 


RDW  14.1 % (11.9-15.9)   04/10/18  07:00    


 


Plt Count  181 K/MM3 (134-434)   04/10/18  07:00    


 


MPV  7.4 fl (7.5-11.1)  L  04/10/18  07:00    


 


Neutrophils %  57.4 % (42.8-82.8)   04/10/18  07:00    


 


Lymphocytes %  24.9 % (8-40)   04/10/18  07:00    


 


Monocytes %  14.8 % (3.8-10.2)  H  04/10/18  07:00    


 


Eosinophils %  2.3 % (0-4.5)   04/10/18  07:00    


 


Basophils %  0.6 % (0-2.0)   04/10/18  07:00    


 


PT with INR  16.90 SEC (9.98-11.88)  H  04/06/18  17:30    


 


INR  1.50  (0.82-1.09)  H  04/06/18  17:30    


 


PTT (Actin FS)  30.0 SECONDS (26.9-34.4)   04/06/18  17:30    


 


Sodium  142 mmol/L (136-145)   04/10/18  07:00    


 


Potassium  3.6 mmol/L (3.5-5.1)   04/10/18  07:00    


 


Chloride  105 mmol/L ()   04/10/18  07:00    


 


Carbon Dioxide  30 mmol/L (21-32)   04/10/18  07:00    


 


Anion Gap  7  (8-16)  L  04/10/18  07:00    


 


BUN  5 mg/dL (7-18)  L  04/10/18  07:00    


 


Creatinine  1.0 mg/dL (0.7-1.3)   04/10/18  07:00    


 


Creat Clearance w eGFR  > 60  (>60)   04/09/18  12:30    


 


Random Glucose  98 mg/dL ()  D 04/10/18  07:00    


 


Calcium  8.2 mg/dL (8.5-10.1)  L  04/10/18  07:00    


 


Phosphorus  4.0 mg/dL (2.5-4.9)   04/10/18  07:00    


 


Magnesium  2.2 mg/dL (1.8-2.4)   04/10/18  07:00    


 


Total Bilirubin  0.6 mg/dL (0.2-1.0)  D 04/09/18  12:30    


 


AST  16 U/L (15-37)   04/09/18  12:30    


 


ALT  34 U/L (12-78)   04/09/18  12:30    


 


Alkaline Phosphatase  72 U/L ()  D 04/09/18  12:30    


 


Troponin I  0.11 ng/ml (0.00-0.05)  H  04/07/18  05:00    


 


B-Natriuretic Peptide  265.04 pg/ml (5-450)   04/06/18  17:30    


 


Total Protein  6.3 g/dl (6.4-8.2)  L  04/09/18  12:30    


 


Albumin  3.2 g/dl (3.4-5.0)  L  04/09/18  12:30    


 


Triglycerides  112 mg/dL ()   04/07/18  05:00    


 


Cholesterol  95 mg/dL ()  D 04/07/18  05:00    


 


Total LDL Cholesterol  42 mg/dL (5-100)  D 04/07/18  05:00    


 


HDL Cholesterol  51 mg/dL (40-60)   04/07/18  05:00    


 


TSH  1.29 uIU/ml (0.358-3.74)  D 04/08/18  06:00    














Problem List





- Problems


(1) Abdominal pain


Code(s): R10.9 - UNSPECIFIED ABDOMINAL PAIN   


Qualifiers: 


   Abdominal location: generalized   Qualified Code(s): R10.84 - Generalized 

abdominal pain   





Assessment/Plan





Non-specific, chronic abdominal pain in settings of chronically altered bowels. 


Trial of probiotics x2-4 weeks


Miralax bid


High fiber diet


will follow

## 2018-04-10 NOTE — PN
Progress Note, Physician


Chief Complaint: 


Mr Oliva complains of pain in his back, abdomen, and chest. Remains 

unchanged. No sob or n/v. Complains of diarrhea.





- Current Medication List


Current Medications: 


Active Medications





Albuterol Sulfate (Ventolin Hfa Inhaler -)  2 puff IH Q4H PRN


   PRN Reason: SHORT OF BREATH/WHEEZING


Apixaban (Eliquis -)  5 mg PO BID [facility]


   Last Admin: 04/10/18 09:29 Dose:  5 mg


Aspirin (Asa -)  81 mg PO DAILY [facility]


   Last Admin: 04/10/18 09:29 Dose:  81 mg


Docusate Sodium (Colace -)  200 mg PO HS [facility]


   Last Admin: 04/09/18 21:19 Dose:  Not Given


Furosemide (Lasix -)  40 mg PO DAILY [facility]


   Last Admin: 04/10/18 09:28 Dose:  40 mg


Lactobacillus Acidophilus (Bacid -)  1 cap PO DAILY [facility]


   Last Admin: 04/10/18 09:28 Dose:  1 cap


Levothyroxine Sodium (Synthroid -)  88 mcg PO AM [facility]


   Last Admin: 04/10/18 06:03 Dose:  88 mcg


Melatonin (Melatonin)  5 mg PO HS [facility]


   Last Admin: 04/09/18 23:43 Dose:  5 mg


Metoprolol Succinate (Toprol Xl -)  50 mg PO [facility]


Morphine Sulfate (Ms Contin -)  30 mg PO BID [facility]


   Last Admin: 04/10/18 09:27 Dose:  30 mg


Pantoprazole Sodium (Protonix -)  40 mg PO DAILY [facility]


   Last Admin: 04/10/18 09:29 Dose:  40 mg


Ranolazine (Ranexa -)  500 mg PO BID [facility]


   Last Admin: 04/10/18 09:29 Dose:  500 mg


Simethicone (Mylicon -)  80 mg PO QID PRN


   PRN Reason: GAS


   Last Admin: 04/10/18 11:23 Dose:  80 mg


Tamsulosin HCl (Flomax -)  0.4 mg PO DAILY@0830 [facility]


   Last Admin: 04/10/18 09:28 Dose:  0.4 mg


Valsartan (Diovan -)  40 mg PO DAILY [facility]











- Objective


Vital Signs: 


 Vital Signs











Temperature  36.4 C   04/10/18 09:00


 


Pulse Rate  74   04/10/18 09:00


 


Respiratory Rate  18   04/10/18 12:00


 


Blood Pressure  139/73   04/10/18 09:00


 


O2 Sat by Pulse Oximetry (%)  96   04/10/18 12:00











Constitutional: Yes: No Distress, Calm, Obese


Cardiovascular: Yes: Regular Rate and Rhythm.  No: Gallop, Murmur, Rub


Respiratory: Yes: Regular, CTA Bilaterally.  No: Rales, Rhonchi, Wheezes


Gastrointestinal: Yes: Normal Bowel Sounds, Soft.  No: Distention, Tenderness


Extremities: Yes: WNL


Edema: No


Labs: 


 CBC, BMP





 04/10/18 07:00 





 04/10/18 07:00 





 INR, PTT











INR  1.50  (0.82-1.09)  H  04/06/18  17:30    














Problem List





- Problems


(1) Abdominal pain


Code(s): R10.9 - UNSPECIFIED ABDOMINAL PAIN   


Qualifiers: 


   Abdominal location: generalized   Qualified Code(s): R10.84 - Generalized 

abdominal pain   





(2) Coronary artery disease


Code(s): I25.10 - ATHSCL HEART DISEASE OF NATIVE CORONARY ARTERY W/O ANG PCTRS 

  


Qualifiers: 


   Coronary Disease-Associated Artery/Lesion type: native artery   Native vs. 

transplanted heart: native heart   Associated angina: without angina   

Qualified Code(s): I25.10 - Atherosclerotic heart disease of native coronary 

artery without angina pectoris   





(3) COPD (chronic obstructive pulmonary disease)


Code(s): J44.9 - CHRONIC OBSTRUCTIVE PULMONARY DISEASE, UNSPECIFIED   


Qualifiers: 


   COPD type: COPD with acute exacerbation   Qualified Code(s): J44.1 - Chronic 

obstructive pulmonary disease with (acute) exacerbation   





(4) CVA (cerebral vascular accident)


Code(s): I63.9 - CEREBRAL INFARCTION, UNSPECIFIED   


Qualifiers: 


   CVA mechanism: unspecified   Qualified Code(s): I63.9 - Cerebral infarction, 

unspecified   





(5) Constipation


Code(s): K59.00 - CONSTIPATION, UNSPECIFIED   





(6) GERD (gastroesophageal reflux disease)


Code(s): K21.9 - GASTRO-ESOPHAGEAL REFLUX DISEASE WITHOUT ESOPHAGITIS   





(7) HTN (hypertension)


Code(s): I10 - ESSENTIAL (PRIMARY) HYPERTENSION   


Qualifiers: 


   Hypertension type: essential hypertension   Qualified Code(s): I10 - 

Essential (primary) hypertension   





(8) Hypothyroid


Code(s): E03.9 - HYPOTHYROIDISM, UNSPECIFIED   


Qualifiers: 


   Hypothyroidism type: due to acquired atrophy of thyroid   Qualified Code(s): 

E03.4 - Atrophy of thyroid (acquired)   





Assessment/Plan





(1) Abdominal pain


Assessment/Plan: 


-Dr Yoon and Dr Valenzuela's notes reviewed


-exam remains unchanged from yesterday


-will hold miralax secondary to diarrhea


-will otherwise continue GI recommendations


-add simethicone prn


Code(s): R10.9 - UNSPECIFIED ABDOMINAL PAIN   


Qualifiers: 


   Abdominal location: generalized   Qualified Code(s): R10.84 - Generalized 

abdominal pain   





(2) Coronary artery disease


Assessment/Plan: 


-continue ranexa


Code(s): I25.10 - ATHSCL HEART DISEASE OF NATIVE CORONARY ARTERY W/O ANG PCTRS 

  


Qualifiers: 


   Coronary Disease-Associated Artery/Lesion type: native artery   Native vs. 

transplanted heart: native heart   Associated angina: without angina   

Qualified Code(s): I25.10 - Atherosclerotic heart disease of native coronary 

artery without angina pectoris   





(3) COPD (chronic obstructive pulmonary disease)


Assessment/Plan: 


-not in exacerbation


-continue albuterol


Code(s): J44.9 - CHRONIC OBSTRUCTIVE PULMONARY DISEASE, UNSPECIFIED   


Qualifiers: 


   COPD type: COPD with acute exacerbation   Qualified Code(s): J44.1 - Chronic 

obstructive pulmonary disease with (acute) exacerbation   





(4) CVA (cerebral vascular accident)


Assessment/Plan: 


-on aspirin and eliquis


Code(s): I63.9 - CEREBRAL INFARCTION, UNSPECIFIED   


Qualifiers: 


   CVA mechanism: unspecified   Qualified Code(s): I63.9 - Cerebral infarction, 

unspecified   





(5) Constipation


Assessment/Plan: 


-on miralax


Code(s): K59.00 - CONSTIPATION, UNSPECIFIED   





(6) GERD (gastroesophageal reflux disease)


Assessment/Plan: 


-continue protonix


Code(s): K21.9 - GASTRO-ESOPHAGEAL REFLUX DISEASE WITHOUT ESOPHAGITIS   





(7) HTN (hypertension)


Assessment/Plan: 


-controlled


-monitor


-continue valsartan and furosemide


-toprol xl increased per cardiology


Code(s): I10 - ESSENTIAL (PRIMARY) HYPERTENSION   


Qualifiers: 


   Hypertension type: essential hypertension   Qualified Code(s): I10 - 

Essential (primary) hypertension   





(8) Hypothyroid


Assessment/Plan: 


-continue levothyroxine


Code(s): E03.9 - HYPOTHYROIDISM, UNSPECIFIED   


Qualifiers: 


   Hypothyroidism type: due to acquired atrophy of thyroid   Qualified Code(s): 

E03.4 - Atrophy of thyroid (acquired)   





Dispo


-plan for discharge when safe from cardiac standpoint

## 2018-04-10 NOTE — PN
Progress Note (short form)





- Note


Progress Note: 


Chief Complaint: Events noted, notes reviewed, reports persistent dyspnea and 

intermittent chest discomfort





History of Present Illness: 


Seen and examined on telemetry. Events noted, notes reviewed, reports 

persistent dyspnea and intermittent chest discomfort





Echocardiography revealed Low normal left ventricular systolic function with 

estimated left ventricular ejection fraction between 50-55%, normal right 

ventricular size and systolic function, aortic valve leaflet sclerosis, mitral 

annular calcification, mild mitral valve regurgitation with no evidence of 

pulmonary hypertension





- Current Medication List


 Current Medications





Albuterol Sulfate (Ventolin Hfa Inhaler -)  2 puff IH Q4H PRN


   PRN Reason: SHORT OF BREATH/WHEEZING


Apixaban (Eliquis -)  5 mg PO BID [facility]


   Last Admin: 04/10/18 09:29 Dose:  5 mg


Aspirin (Asa -)  81 mg PO DAILY [facility]


   Last Admin: 04/10/18 09:29 Dose:  81 mg


Diltiazem HCl (Cardizem Cd -)  120 mg PO DAILY [facility]


   Last Admin: 04/10/18 09:27 Dose:  120 mg


Docusate Sodium (Colace -)  200 mg PO [facility]


   Last Admin: 04/09/18 21:19 Dose:  Not Given


Furosemide (Lasix -)  40 mg PO DAILY [facility]


   Last Admin: 04/10/18 09:28 Dose:  40 mg


Lactobacillus Acidophilus (Bacid -)  1 cap PO DAILY [facility]


   Last Admin: 04/10/18 09:28 Dose:  1 cap


Levothyroxine Sodium (Synthroid -)  88 mcg PO AM [facility]


   Last Admin: 04/10/18 06:03 Dose:  88 mcg


Melatonin (Melatonin)  5 mg PO [facility]


   Last Admin: 04/09/18 23:43 Dose:  5 mg


Metoprolol Succinate (Toprol Xl -)  25 mg PO [facility]


   Last Admin: 04/09/18 21:19 Dose:  25 mg


Morphine Sulfate (Ms Contin -)  30 mg PO BID [facility]


   Last Admin: 04/10/18 09:27 Dose:  30 mg


Pantoprazole Sodium (Protonix -)  40 mg PO DAILY [facility]


   Last Admin: 04/10/18 09:29 Dose:  40 mg


Polyethylene Glycol (Miralax (For Daily Use) -)  17 gm PO DAILY [facility]


   Last Admin: 04/10/18 11:32 Dose:  Not Given


Ranolazine (Ranexa -)  500 mg PO BID [facility]


   Last Admin: 04/10/18 09:29 Dose:  500 mg


Simethicone (Mylicon -)  80 mg PO QID PRN


   PRN Reason: GAS


   Last Admin: 04/10/18 11:23 Dose:  80 mg


Tamsulosin HCl (Flomax -)  0.4 mg PO DAILY@0830 [facility]


   Last Admin: 04/10/18 09:28 Dose:  0.4 mg








- Review of Systems


Constitutional: denies: Chills or Fever


Cardiovascular: As noted above


Respiratory: reports: Cough and Sputum Production


Gastrointestinal: denies: Nausea, Vomiting, Diarrhea, Constipation but reports 

intermittent Abdominal Pain


Musculoskeletal: denies: Joint Pain


Neurological: denies: Dizziness or Headaches





- Objective


Vital Signs: 


 Last Vital Signs











Temp Pulse Resp BP Pulse Ox


 


 97.6 F   74   18   139/73   96 


 


 04/10/18 09:00  04/10/18 09:00  04/10/18 09:00  04/10/18 09:00  04/09/18 20:02








 Intake & Output











 04/07/18 04/08/18 04/09/18 04/10/18





 23:59 23:59 23:59 23:59


 


Intake Total 20 10 790 250


 


Output Total  700


 


Balance -680 -890 -610 -450


 


Weight 222 lb 1 oz   














Neck:  Supple Negative JVD


Cardiovascular: S1 and S2 Regular Rate and Rhythm


Respiratory:  Diminished Bilaterally at the Bases


Gastrointestinal: Soft, Benign Normal Bowel Sounds


Ext: Trace Edema Bilaterally





Labs: 


 CBC, BMP





 04/10/18 07:00 





 04/10/18 07:00 





 Hepatic Panel











Total Bilirubin  0.6 mg/dL (0.2-1.0)  D 04/09/18  12:30    


 


AST  16 U/L (15-37)   04/09/18  12:30    


 


ALT  34 U/L (12-78)   04/09/18  12:30    


 


Alkaline Phosphatase  72 U/L ()  D 04/09/18  12:30    


 


Albumin  3.2 g/dl (3.4-5.0)  L  04/09/18  12:30    











Assessment/Plan





ASSESSMENT:





1. Dyspnea referable COPD and ILD, exacerbation, resolving 


2. CAD post multi-vessel PCI, angina pectoris with evidence of demand ischemic 

injury


3. Systolic/diastolic left ventricular dysfunction with chronic class I-II [place] 

[place] [employer] classification left ventricular failure, resolved


4. Hypertension


5. Hypercholesterolemia


6. History of cerebrovascular disease


7. Factor V Leiden with history of DVT and PE post IVC filter implant on NOAC's


8. Hypothyroidism


9. History of prostate CA





PLAN:





1. Discontinue Cardizem CD in view of the above-noted systolic left ventricular 

dysfunction


2. Continue Toprol-XL therapy and titrate dosage as tolerated 


3. Continue Ranexa 


4. Recommend the addition of ACE inhibitor or angiotensin receptor blocker 

therapy unless it is absolutely contraindicated


5. Continue Eliquis at 5 mg twice daily, and Ecotrin with caution and close 

monitoring of CBC 


6. Continue Lasix with caution and close monitoring of renal function


7. Continue IV steroids and bronchodilator therapy














Buck Hernandez MD

## 2018-04-11 VITALS — TEMPERATURE: 97.8 F | HEART RATE: 70 BPM | SYSTOLIC BLOOD PRESSURE: 100 MMHG | DIASTOLIC BLOOD PRESSURE: 57 MMHG

## 2018-04-11 RX ADMIN — RANOLAZINE SCH MG: 500 TABLET, FILM COATED, EXTENDED RELEASE ORAL at 09:27

## 2018-04-11 RX ADMIN — ASPIRIN 81 MG SCH MG: 81 TABLET ORAL at 09:28

## 2018-04-11 RX ADMIN — VALSARTAN SCH MG: 40 TABLET, FILM COATED ORAL at 09:27

## 2018-04-11 RX ADMIN — Medication SCH CAP: at 09:27

## 2018-04-11 RX ADMIN — TAMSULOSIN HYDROCHLORIDE SCH MG: 0.4 CAPSULE ORAL at 09:28

## 2018-04-11 RX ADMIN — PANTOPRAZOLE SODIUM SCH MG: 40 TABLET, DELAYED RELEASE ORAL at 09:28

## 2018-04-11 RX ADMIN — LEVOTHYROXINE SODIUM SCH MCG: 88 TABLET ORAL at 06:14

## 2018-04-11 RX ADMIN — FUROSEMIDE SCH MG: 40 TABLET ORAL at 09:28

## 2018-04-11 RX ADMIN — MORPHINE SULFATE SCH MG: 30 TABLET, EXTENDED RELEASE ORAL at 09:28

## 2018-04-11 RX ADMIN — APIXABAN SCH MG: 5 TABLET, FILM COATED ORAL at 09:27

## 2018-04-11 NOTE — PN
Progress Note (short form)





- Note


Progress Note: 


Chief Complaint: Events noted, notes reviewed, reports persistent dyspnea but 

improved, denies any chest discomfort





History of Present Illness: 


Seen and examined on telemetry. Events noted, notes reviewed, reports 

persistent dyspnea but improved, denies any chest discomfort





Echocardiography revealed Low normal left ventricular systolic function with 

estimated left ventricular ejection fraction between 50-55%, normal right 

ventricular size and systolic function, aortic valve leaflet sclerosis, mitral 

annular calcification, mild mitral valve regurgitation with no evidence of 

pulmonary hypertension





- Current Medication List


 Current Medications





Albuterol Sulfate (Ventolin Hfa Inhaler -)  2 puff IH Q4H PRN


   PRN Reason: SHORT OF BREATH/WHEEZING


Apixaban (Eliquis -)  5 mg PO BID [facility]


   Last Admin: 04/11/18 09:27 Dose:  5 mg


Aspirin (Asa -)  81 mg PO DAILY [facility]


   Last Admin: 04/11/18 09:28 Dose:  81 mg


Docusate Sodium (Colace -)  200 mg PO [facility]


   Last Admin: 04/10/18 21:06 Dose:  Not Given


Furosemide (Lasix -)  40 mg PO DAILY [facility]


   Last Admin: 04/11/18 09:28 Dose:  40 mg


Lactobacillus Acidophilus (Bacid -)  1 cap PO DAILY [facility]


   Last Admin: 04/11/18 09:27 Dose:  1 cap


Levothyroxine Sodium (Synthroid -)  88 mcg PO AM [facility]


   Last Admin: 04/11/18 06:14 Dose:  88 mcg


Melatonin (Melatonin)  5 mg PO HS [facility]


   Last Admin: 04/10/18 22:33 Dose:  5 mg


Metoprolol Succinate (Toprol Xl -)  50 mg PO [facility]


   Last Admin: 04/10/18 21:05 Dose:  50 mg


Morphine Sulfate (Ms Contin -)  30 mg PO BID [facility]


   Last Admin: 04/11/18 09:28 Dose:  30 mg


Pantoprazole Sodium (Protonix -)  40 mg PO DAILY [facility]


   Last Admin: 04/11/18 09:28 Dose:  40 mg


Ranolazine (Ranexa -)  500 mg PO BID [facility]


   Last Admin: 04/11/18 09:27 Dose:  500 mg


Simethicone (Mylicon -)  80 mg PO QID PRN


   PRN Reason: GAS


   Last Admin: 04/10/18 11:23 Dose:  80 mg


Tamsulosin HCl (Flomax -)  0.4 mg PO DAILY@0830 [facility]


   Last Admin: 04/11/18 09:28 Dose:  0.4 mg


Valsartan (Diovan -)  40 mg PO DAILY [facility]


   Last Admin: 04/11/18 09:27 Dose:  40 mg





- Review of Systems


Constitutional: denies: Chills or Fever


Cardiovascular: As noted above


Respiratory: reports: Cough and Sputum Production


Gastrointestinal: denies: Nausea, Vomiting, Diarrhea, Constipation but reports 

intermittent Abdominal Pain


Musculoskeletal: denies: Joint Pain


Neurological: denies: Dizziness or Headaches





- Objective


Vital Signs: 


 Last Vital Signs











Temp Pulse Resp BP Pulse Ox


 


 98.2 F   55 L  20   106/40   94 L


 


 04/11/18 05:32  04/11/18 05:32  04/11/18 05:32  04/11/18 05:32  04/10/18 19:25








 Intake & Output











 04/08/18 04/09/18 04/10/18 04/11/18





 23:59 23:59 23:59 23:59


 


Intake Total 10 790 570 


 


Output Total 900 1400 1000 200


 


Balance -890 -610 -430 -200








Neck:  Supple Negative JVD


Cardiovascular: S1 and S2 Regular Rate and Rhythm


Respiratory:  Diminished Bilaterally at the Bases


Gastrointestinal: Soft, Benign Normal Bowel Sounds


Ext: Trace Edema Bilaterally





Labs: 


 CBC, BMP





 04/10/18 07:00 





 04/10/18 07:00 








Assessment/Plan





ASSESSMENT:





1. Dyspnea referable COPD and ILD, exacerbation, resolving 


2. CAD post multi-vessel PCI, angina pectoris with evidence of demand ischemic 

injury


3. Systolic/diastolic left ventricular dysfunction with chronic class I-II [place] 

[place] [employer] classification left ventricular failure, resolved


4. Hypertension


5. Hypercholesterolemia


6. History of cerebrovascular disease


7. Factor V Leiden with history of DVT and PE post IVC filter implant on NOAC's


8. Hypothyroidism


9. History of prostate CA





PLAN:





1. Continue Toprol-XL therapy and titrate dosage as tolerated 


2. Continue Diovan 


3. Continue Ranexa 


4. Continue Eliquis at 5 mg twice daily, and Ecotrin with caution and close 

monitoring of CBC 


5. Continue PO Lasix with caution and close monitoring of renal function


6. Continue bronchodilator therapy


7. D/C planning as per the primary team, advised F/U in the office post D/C














Buck Hernandez MD

## 2018-04-11 NOTE — DS
Physical Examination


Vital Signs: 


 Vital Signs











Temperature  36.6 C   04/11/18 10:00


 


Pulse Rate  70   04/11/18 10:00


 


Respiratory Rate  22   04/11/18 10:00


 


Blood Pressure  100/57   04/11/18 10:00


 


O2 Sat by Pulse Oximetry (%)  98   04/11/18 10:00











Labs: 


 CBC, BMP





 04/10/18 07:00 





 04/10/18 07:00 











Discharge Summary


Reason For Visit: CHEST PAIN


Current Active Problems





Abdominal pain (Acute)


Coronary artery disease (Acute)


Demand ischemia (Acute)


S/P coronary artery stent placement (Acute)


SOB (shortness of breath) (Acute)








Condition: Stable





- Instructions


Diet, Activity, Other Instructions: 


resume previous diet and activity


Referrals: 


Yogesh Walker MD [Staff Physician] - 


Benjie Meza MD [Primary Care Provider] - 1 Week


Buck Hernandez MD [Staff Physician] - 


Disposition: HOME





- Home Medications


Comprehensive Discharge Medication List: 


Ambulatory Orders





Albuterol Sulfate [Proair Hfa] 2 puff IH BID 04/06/18 


Alirocumab [Praluent Pen] 75 mg SQ ASDIR 04/06/18 


Apixaban [Eliquis -] 5 mg PO BID 04/06/18 


Aspirin [ASA -] 81 mg PO DAILY 04/06/18 


Diltiazem Cd [Cardizem Cd -] 120 mg PO DAILY 04/06/18 


Docusate Sodium 200 mg PO HS 04/06/18 


Fluticasone Prop 0.05% Nasal [Flonase -] 1 - 2 spray NS BID 04/06/18 


Furosemide [Lasix -] 40 mg PO DAILY 04/06/18 


HYDROmorphone [Dilaudid -] 6 mg PO Q8H PRN 04/06/18 


Levothyroxine Sodium [Synthroid] 88 mcg PO AM 04/06/18 


Linaclotide [Linzess] 290 mcg PO DAILY 04/06/18 


Melatonin 5 mg PO HS 04/06/18 


Morphine *Sr* [MS Contin -] 30 mg PO Q12H PRN 04/06/18 


Omeprazole 40 mg PO DAILY 04/06/18 


Ranolazine [Ranexa -] 500 mg PO BID 04/06/18 


Tamsulosin HCl 0.4 mg PO DAILY 04/06/18 


Lactobacillus Acidophilus [Bacid -] 1 cap PO DAILY #20 cap 04/11/18 


Metoprolol Succinate [Toprol XL -] 50 mg PO HS #30 tab.sr.24h 04/11/18 


Polyethylene Glycol 3350 [Miralax 119 gm Btl -] 17 gm PO DAILY #1 bottle 04/11/ 18 


Simethicone [Mylicon -] 80 mg PO QID PRN #60 tab.chew 04/11/18 


Valsartan [Diovan] 40 mg PO DAILY #30 tablet 04/11/18

## 2019-10-22 ENCOUNTER — HOSPITAL ENCOUNTER (EMERGENCY)
Dept: HOSPITAL 74 - JER | Age: 79
Discharge: HOME | End: 2019-10-22
Payer: COMMERCIAL

## 2019-10-22 VITALS — HEART RATE: 82 BPM | SYSTOLIC BLOOD PRESSURE: 111 MMHG | DIASTOLIC BLOOD PRESSURE: 65 MMHG

## 2019-10-22 VITALS — TEMPERATURE: 97.3 F

## 2019-10-22 VITALS — BODY MASS INDEX: 27.8 KG/M2

## 2019-10-22 DIAGNOSIS — Z99.81: ICD-10-CM

## 2019-10-22 DIAGNOSIS — D68.51: ICD-10-CM

## 2019-10-22 DIAGNOSIS — Z87.19: ICD-10-CM

## 2019-10-22 DIAGNOSIS — Z95.828: ICD-10-CM

## 2019-10-22 DIAGNOSIS — R07.9: Primary | ICD-10-CM

## 2019-10-22 DIAGNOSIS — I69.854: ICD-10-CM

## 2019-10-22 DIAGNOSIS — J44.9: ICD-10-CM

## 2019-10-22 DIAGNOSIS — I10: ICD-10-CM

## 2019-10-22 DIAGNOSIS — I25.10: ICD-10-CM

## 2019-10-22 DIAGNOSIS — Z95.5: ICD-10-CM

## 2019-10-22 DIAGNOSIS — Z79.82: ICD-10-CM

## 2019-10-22 DIAGNOSIS — Z79.01: ICD-10-CM

## 2019-10-22 DIAGNOSIS — Z87.442: ICD-10-CM

## 2019-10-22 DIAGNOSIS — E78.00: ICD-10-CM

## 2019-10-22 DIAGNOSIS — K21.9: ICD-10-CM

## 2019-10-22 DIAGNOSIS — Z85.46: ICD-10-CM

## 2019-10-22 LAB
ALBUMIN SERPL-MCNC: 3.2 G/DL (ref 3.4–5)
ALP SERPL-CCNC: 58 U/L (ref 45–117)
ALT SERPL-CCNC: 15 U/L (ref 13–61)
ANION GAP SERPL CALC-SCNC: 9 MMOL/L (ref 8–16)
AST SERPL-CCNC: 10 U/L (ref 15–37)
BASOPHILS # BLD: 0.5 % (ref 0–2)
BILIRUB SERPL-MCNC: 0.6 MG/DL (ref 0.2–1)
BNP SERPL-MCNC: 139.6 PG/ML (ref 5–450)
BUN SERPL-MCNC: 15.3 MG/DL (ref 7–18)
CALCIUM SERPL-MCNC: 7.8 MG/DL (ref 8.5–10.1)
CHLORIDE SERPL-SCNC: 110 MMOL/L (ref 98–107)
CO2 SERPL-SCNC: 23 MMOL/L (ref 21–32)
CREAT SERPL-MCNC: 1 MG/DL (ref 0.55–1.3)
DEPRECATED RDW RBC AUTO: 15.2 % (ref 11.9–15.9)
EOSINOPHIL # BLD: 0.8 % (ref 0–4.5)
GLUCOSE SERPL-MCNC: 92 MG/DL (ref 74–106)
HCT VFR BLD CALC: 44.9 % (ref 35.4–49)
HGB BLD-MCNC: 14.7 GM/DL (ref 11.7–16.9)
INR BLD: 1.3 (ref 0.83–1.09)
LIPASE SERPL-CCNC: 234 U/L (ref 73–393)
LYMPHOCYTES # BLD: 17.3 % (ref 8–40)
MCH RBC QN AUTO: 30.9 PG (ref 25.7–33.7)
MCHC RBC AUTO-ENTMCNC: 32.6 G/DL (ref 32–35.9)
MCV RBC: 94.7 FL (ref 80–96)
MONOCYTES # BLD AUTO: 9.6 % (ref 3.8–10.2)
NEUTROPHILS # BLD: 71.8 % (ref 42.8–82.8)
PLATELET # BLD AUTO: 152 K/MM3 (ref 134–434)
PMV BLD: 8.4 FL (ref 7.5–11.1)
POTASSIUM SERPLBLD-SCNC: 3.6 MMOL/L (ref 3.5–5.1)
PROT SERPL-MCNC: 5.7 G/DL (ref 6.4–8.2)
PT PNL PPP: 15.4 SEC (ref 9.7–13)
RBC # BLD AUTO: 4.74 M/MM3 (ref 4–5.6)
SODIUM SERPL-SCNC: 142 MMOL/L (ref 136–145)
WBC # BLD AUTO: 6.6 K/MM3 (ref 4–10)

## 2019-10-22 PROCEDURE — 3E033NZ INTRODUCTION OF ANALGESICS, HYPNOTICS, SEDATIVES INTO PERIPHERAL VEIN, PERCUTANEOUS APPROACH: ICD-10-PCS

## 2019-10-22 NOTE — PDOC
History of Present Illness





- General


Chief Complaint: Chest Pain


Stated Complaint: SOB


Time Seen by Provider: 10/22/19 16:36





- History of Present Illness


Initial Comments: 





10/22/19 18:58





78 y/o M hx of CAD s/p stent placement x2, HTN,HLD, CVA x2, COPD, factor V 

Leiden deficiency (Eloquis for anticoagulation), diverticulitis,chronic 

abdominal pain and  uses O2 at home, presented today with chest pain. The pt. 

has had chest pain over the last month and a half. He had a stress test on 10/

18 which showed LVEF of 62% with no ischemic changes or arrhythmia. BIBEMS with 

continued complaints of midsternal chest pain, radiating to his right shoulder.

  Pt denies, cough,fever, chills nausea vomiting.





Past History





- Past Medical History


Allergies/Adverse Reactions: 


 Allergies











Allergy/AdvReac Type Severity Reaction Status Date / Time


 


No Known Allergies Allergy   Verified 10/22/19 16:05











Home Medications: 


Ambulatory Orders





Albuterol Sulfate [Proair Hfa] 2 puff IH BID 04/06/18 


Alirocumab [Praluent Pen] 75 mg SQ ASDIR 04/06/18 


Apixaban [Eliquis -] 5 mg PO BID 04/06/18 


Aspirin [ASA -] 81 mg PO DAILY 04/06/18 


Diltiazem Cd [Cardizem Cd -] 120 mg PO DAILY 04/06/18 


Docusate Sodium 200 mg PO HS 04/06/18 


Fluticasone Prop 0.05% Nasal [Flonase -] 1 - 2 spray NS BID 04/06/18 


Furosemide [Lasix -] 40 mg PO DAILY 04/06/18 


HYDROmorphone [Dilaudid -] 6 mg PO Q8H PRN 04/06/18 


Levothyroxine Sodium [Synthroid] 88 mcg PO AM 04/06/18 


Linaclotide [Linzess] 290 mcg PO DAILY 04/06/18 


Melatonin 5 mg PO HS 04/06/18 


Morphine *Sr* [MS Contin -] 30 mg PO Q12H PRN 04/06/18 


Omeprazole 40 mg PO DAILY 04/06/18 


Ranolazine [Ranexa -] 500 mg PO BID 04/06/18 


Tamsulosin HCl 0.4 mg PO DAILY 04/06/18 


Lactobacillus Acidophilus [Bacid -] 1 cap PO DAILY #20 cap 04/11/18 


Metoprolol Succinate [Toprol XL -] 50 mg PO HS #30 tab.sr.24h 04/11/18 


Polyethylene Glycol 3350 [Miralax 119 gm Btl -] 17 gm PO DAILY #1 bottle 04/11/ 18 


Simethicone [Mylicon -] 80 mg PO QID PRN #60 tab.chew 04/11/18 


Valsartan [Diovan] 40 mg PO DAILY #30 tablet 04/11/18 








Anemia: No


Asthma: No


Cancer: Yes (PROSTATE)


Cardiac Disorders: Yes (STENTS, FACTOR 5, CLOTTING DISORDER)


CVA: Yes (X2 '93 / '04 / (L) WEAKNESS)


COPD: Yes (O2 DEPENDENT)


CHF: No


Dementia: No


Diabetes: No


GI Disorders: Yes (diverticulosis,gerd)


 Disorders: Yes (kidney stone)


HTN: Yes


Hypercholesterolemia: Yes


Kidney Stones: Yes


Liver Disease: No


Seizures: No


Thyroid Disease: No





- Surgical History


Abdominal Surgery: No


Appendectomy: No


Cardiac Surgery: Yes (2 STENTS, SUJIT FILTER)


Cholecystectomy: No


Lung Surgery: No


Neurologic Surgery: No





- Immunization History


Immunization Up to Date: Yes





- Psycho Social/Smoking Cessation Hx


Smoking Status: No


Smoking History: Never smoked


Have you smoked in the past 12 months: No


Number of Cigarettes Smoked Daily: 0


Information on smoking cessation initiated: No


Hx Alcohol Use: No


Drug/Substance Use Hx: No


Substance Use Type: None


Hx Substance Use Treatment: No





**Review of Systems





- Review of Systems


Constitutional: No: Chills, Fever


HEENTM: No: Eye Pain, Blurred Vision


Respiratory: No: Cough


Cardiac (ROS): Yes: See HPI


ABD/GI: No: Constipated, Diarrhea


: No: Burning, Dysuria


Integumentary: Yes: Bruising


Neurological: No: Headache, Numbness


Psychiatric: Yes: Anxiety





*Physical Exam





- Vital Signs


 Last Vital Signs











Temp Pulse Resp BP Pulse Ox


 


 97.3 F L  73   22 H  141/81   100 


 


 10/22/19 16:06  10/22/19 16:06  10/22/19 16:06  10/22/19 16:06  10/22/19 16:06














- Physical Exam


Comments: 





10/22/19 18:09


PE: 


GENERAL: Awake, alert, and fully oriented, in no acute distress


HEAD: No signs of trauma, normocephalic, atraumatic 


EYES: PERRLA, EOMI, sclera anicteric, conjunctiva clear


ENT: Auricles normal inspection, hearing grossly normal, nares patent, 

oropharynx clear without exudates. Moist mucosa


NECK: Normal ROM, supple, no lymphadenopathy, JVD, or masses


LUNGS: No distress,shortness of breath with exertionl.speaks full sentences, 

crackles in lung bases bilaterally. 


HEART: Irregular rate  normal S1 and S2, no murmurs, rubs or gallops, 

peripheral pulses normal and equal bilaterally. 


ABDOMEN: Soft, nontender, normoactive bowel sounds.  No guarding, no rebound.  

No masses


EXTREMITIES : Normal inspection, Normal range of motion, 1+ edema to calf. 

pedal echymosis on left leg.(pt on anticoagulation) No clubbing or cyanosis


NEUROLOGICAL: Cranial nerves II through XII grossly intact.  Normal speech, 

normal gait, no focal sensorimotor deficits 


SKIN: Warm, Dry, normal turgor, no rashes or lesions noted











ED Treatment Course





- LABORATORY


CBC & Chemistry Diagram: 


 10/22/19 17:33





 10/22/19 17:33





Medical Decision Making





- Medical Decision Making





10/22/19 18:07


EKG, CMP, cardiac profile, bnp, lipase, pt/inr, cardiac monitoring 





EKG: Sinus rhythm with premature atrial complexes


possible left atrial enlargement.








10/22/19 18:12


Pt had a stress test last week which showed LVEF of 62% with no ischemia,

arrthymias and normal wall motion noted during testing. 


baseline EKG at that time showed single isolated premature ventricular 

contractions during infusion. no significant ST changes.


Per pt's cardiology team (Dr. Perez), he is known for exaggerating his 

condition. He is okay with our chest pain workup. 


pt reports that he was referred by Dr. Soler to the ED, for transfer to [facility] for chest pain. senior resident confirmed with  (covering for 

Dr. Soler) that pt. has h/o falsely reporting need for transfer to 


[facility], or referral to ED. Per Dr. luke advises routine ED workup for pt. with 

chronic chest pain, serial troponins. and admit as we see fit.


10/22/19 18:17





10/22/19 19:16


Labs


Trop negative


repeat 2nd trop at 8:33


Lipase  within normal limits.


10/22/19 20:56





Pt given 4mg morphine IV at 7:08 for pain. 


continues to compain of general body aches 


1000mg IV tylenol ordered





10/22/19 21:53


Second troponin negative


Discharge with follow up with cardiologist.








Discharge





- Discharge Information


Problems reviewed: Yes


Clinical Impression/Diagnosis: 


Chest pain


Qualifiers:


 Chest pain type: unspecified Qualified Code(s): R07.9 - Chest pain, unspecified





Condition: Stable


Disposition: HOME





- Admission


No





- Follow up/Referral


Referrals: 


Benjie Meza MD [Primary Care Provider] - 


Tracy Soler MD [Staff Physician] - 


Bg Luke MD [Staff Physician] - 





- Patient Discharge Instructions


Patient Printed Discharge Instructions:  DI for Chest Pain


Additional Instructions: 


You were seen in the ED for chest pain. 


You have been give copies of all the lab work that was done.





your workup was negative. Follow up with your Primary care provider and 

cardiologist in the next few days. Your care is not complete until you do so.


Your care is not complete until you do so





Return to the ER


if your chest pain worsens


if your pain medication at home does not work.








- Post Discharge Activity

## 2019-10-22 NOTE — PDOC
Attending Attestation





- Resident


Resident Name: Di Hall





- ED Attending Attestation


I have performed the following: I have examined & evaluated the patient, The 

case was reviewed & discussed with the resident, I agree w/resident's findings 

& plan, Exceptions are as noted





- HPI


HPI: 





10/22/19 17:48


79-year-old male presents with complaint of shortness of breath associated with 

chest pain.


He did have a negative stress test done October 18 of this year it showed LV 

ejection fraction of 62% with normal wall motion and there was no sign of any 

ischemia.


10/22/19 17:50








- Physicial Exam


PE: 





10/22/19 17:51


wnwd 78 yo male with c/o left chest pain


head ncat


neck no bruits


lungs cta b/l


cvs [id number]


abd nontender


skin warm and dry


no cva tenderness


neuro axox3,ambulatory








- Medical Decision Making





10/22/19 17:51


Dr. Joy, the cardiologist who knows this patient well states that this 

presentation is very common for this patient.  Typically they would just trend 

his troponins 


10/22/19 20:53


negative stress test on 10/18/19


plan: IF  second troponin is negative ,pt will be d/c to follow up with his 

cardiologist

## 2019-10-23 NOTE — EKG
Test Reason : 

Blood Pressure : ***/*** mmHG

Vent. Rate : 089 BPM     Atrial Rate : 089 BPM

   P-R Int : 156 ms          QRS Dur : 084 ms

    QT Int : 382 ms       P-R-T Axes : 047 -02 018 degrees

   QTc Int : 464 ms

 

SINUS RHYTHM WITH PREMATURE ATRIAL COMPLEXES

POSSIBLE LEFT ATRIAL ENLARGEMENT

BORDERLINE ECG

WHEN COMPARED WITH ECG OF 06-APR-2018 17:21,

NONSPECIFIC T WAVE ABNORMALITY NO LONGER EVIDENT IN LATERAL LEADS

Confirmed by SANDI SOLIMAN, THALIA ([id number]) on 10/23/2019 9:56:08 AM

 

Referred By:             Confirmed By:THALIA JUNIOR MD

## 2022-03-01 NOTE — PN
Progress Note, Physician


Chief Complaint: 





comfortable denies any new complaints


History of Present Illness: 


77 year old male with a significant past medical history of CVA, CAD s/p stents 

x 2, factor V leiden, diverticulitis, DVT (on Elliquis 5mg BID), GERD, kidney 

stones, MI, hypertension and COPD (home oxygen dependent)   He presents to the 

ED today with complaints of shortness of breath x 1 week with cough of white 

sputum with a sore throat. Mild elevation of Trop I evaluated by cardiology








- Current Medication List


Current Medications: 


Active Medications





Albuterol/Ipratropium (Duoneb -)  1 amp NEB RQID [facility]


   Last Admin: 03/10/18 07:30 Dose:  1 amp


Apixaban (Eliquis -)  5 mg PO BID [facility]


   Last Admin: 03/10/18 10:52 Dose:  5 mg


Aspirin (Asa -)  81 mg PO DAILY [facility]


   Last Admin: 03/10/18 10:52 Dose:  81 mg


Digoxin (Lanoxin -)  0.25 mg PO DAILY [facility]


   Last Admin: 03/10/18 11:01 Dose:  0.25 mg


Diltiazem HCl (Cardizem -)  30 mg PO TID [facility]


   Last Admin: 03/10/18 06:38 Dose:  30 mg


Docusate Sodium (Colace -)  100 mg PO TID [facility]


   Last Admin: 03/10/18 06:38 Dose:  100 mg


Fluticasone Propionate (Flonase -)  1 spray NS BID [facility]


   Last Admin: 03/10/18 10:52 Dose:  1 inh


Furosemide (Lasix -)  40 mg PO DAILY [facility]


   Last Admin: 03/10/18 10:53 Dose:  40 mg


Azithromycin 500 mg/ Dextrose  250 mls @ 250 mls/hr IVPB DAILY [facility]


   Last Admin: 03/09/18 09:28 Dose:  250 mls/hr


Melatonin (Melatonin)  5 mg PO HS PRN


   PRN Reason: INSOMNIA


   Last Admin: 03/09/18 22:16 Dose:  5 mg


Methylprednisolone Sodium Succinate (Solu-Medrol -)  40 mg IVPUSH Q6H-IV [facility]


   Last Admin: 03/10/18 08:59 Dose:  40 mg


Morphine Sulfate (Ms Contin -)  30 mg PO BID@0600,1800 [facility]


   Last Admin: 03/10/18 06:38 Dose:  30 mg


Ranolazine (Ranexa -)  500 mg PO BID [facility]


   Last Admin: 03/10/18 10:54 Dose:  500 mg


Rosuvastatin Calcium (Crestor -)  20 mg PO HS [facility]


   Last Admin: 03/09/18 21:27 Dose:  20 mg


Senna (Senna -)  2 tab PO HS PRN


   PRN Reason: CONSTIPATION


   Last Admin: 03/08/18 21:10 Dose:  2 tab


Tamsulosin HCl (Flomax -)  0.4 mg PO DAILY@0830 [facility]


   Last Admin: 03/10/18 08:59 Dose:  0.4 mg


Thyroid ([place] Thyroid -)  60 mg PO DAILY@0700 [facility]


   Last Admin: 03/10/18 06:38 Dose:  60 mg











- Objective


Vital Signs: 


 Vital Signs











Temperature  97.7 F   03/10/18 06:00


 


Pulse Rate  94 H  03/10/18 11:01


 


Respiratory Rate  18   03/10/18 06:00


 


Blood Pressure  114/61   03/10/18 06:00


 


O2 Sat by Pulse Oximetry (%)  95   03/09/18 21:00











HEENT: Mm Moist, no anemia, PERRLA EOMI





NECK: No JVd No Bruit





CHEST: CTa B/L





CVS; S1S2R 





ABD: Mild distention, non tender Bs +





EXT: Trace candelario afeet, no calf tenderness Pulses +





CNS: AOx3 non foacl














Labs: 


 CBC, BMP





 03/07/18 10:50 





 03/07/18 10:50 





 INR, PTT











INR  1.49  (0.82-1.09)  H  03/06/18  06:30    














Problem List





- Problems


(1) Elevated troponin I level


Assessment/Plan: 


H/O CAD S/P PCI no chest papain christopher cute St t changes last NST -ve in 2016  

normal EF evaluated by cardiology further ischemic W/U as out patient


Code(s): R74.8 - ABNORMAL LEVELS OF OTHER SERUM ENZYMES   





(2) SOB (shortness of breath)


Assessment/Plan: 


Most likely COPd exacerbaytyion in the setting of Bronchitis on Z pack


Code(s): R06.02 - SHORTNESS OF BREATH   





(3) COPD (chronic obstructive pulmonary disease)


Assessment/Plan: 


Stable cont all Home Meds


Code(s): J44.9 - CHRONIC OBSTRUCTIVE PULMONARY DISEASE, UNSPECIFIED   





(4) Factor V deficiency


Assessment/Plan: 


On Apaxiban  H/O DVTs  s/p IVC Filter


Code(s): D68.2 - HEREDITARY DEFICIENCY OF OTHER CLOTTING FACTORS   





(5) SVT (supraventricular tachycardia)


Assessment/Plan: 


Cont Digoxine


Code(s): I47.1 - SUPRAVENTRICULAR TACHYCARDIA   





(6) Constipation


Assessment/Plan: 


last BM 6 days ago  no clinical sign of obstruction cont  Home Laxative


Code(s): K59.00 - CONSTIPATION, UNSPECIFIED   





(7) HTN (hypertension)


Assessment/Plan: 


Well controlled Cont Home Meds


Code(s): I10 - ESSENTIAL (PRIMARY) HYPERTENSION   


Qualifiers: 


   Hypertension type: essential hypertension   Qualified Code(s): I10 - 

Essential (primary) hypertension   





(8) Hypothyroid


Code(s): E03.9 - HYPOTHYROIDISM, UNSPECIFIED   


Qualifiers: 


   Hypothyroidism type: due to acquired atrophy of thyroid   Qualified Code(s): 

E03.4 - Atrophy of thyroid (acquired) Detail Level: Simple

## 2024-03-18 NOTE — PN
Progress Note, Physician


Chief Complaint: 





Events noted


Dyspnea persists


Complains of diffuse abdominal discomfort with palpation


Complains of vague chest discomfort


History of Present Illness: 





Patient was seen and examined. Awake and alert. Chart was reviewed


As outlined above. CT chest ruled out PTE, but presence of subpleural fibrosis 

and coronary calcification





- Current Medication List


Current Medications: 


Active Medications





Albuterol Sulfate (Ventolin Hfa Inhaler -)  2 puff IH Q4H PRN


   PRN Reason: SHORT OF BREATH/WHEEZING


Apixaban (Eliquis -)  5 mg PO BID [facility]


   Last Admin: 04/07/18 09:23 Dose:  5 mg


Aspirin (Asa -)  81 mg PO DAILY [facility]


   Last Admin: 04/07/18 09:23 Dose:  81 mg


Diltiazem HCl (Cardizem Cd -)  120 mg PO DAILY [facility]


   Last Admin: 04/07/18 09:23 Dose:  120 mg


Docusate Sodium (Colace -)  200 mg PO HS [facility]


Furosemide (Lasix -)  40 mg PO DAILY [facility]


   Last Admin: 04/07/18 09:23 Dose:  40 mg


Levothyroxine Sodium (Synthroid -)  88 mcg PO AM [facility]


Melatonin (Melatonin)  5 mg PO HS [facility]


Metoprolol Succinate (Toprol Xl -)  25 mg PO HS [facility]


Morphine Sulfate (Ms Contin -)  30 mg PO BID [facility]


   Last Admin: 04/07/18 09:24 Dose:  30 mg


Pantoprazole Sodium (Protonix -)  40 mg PO DAILY [facility]


   Last Admin: 04/07/18 09:23 Dose:  40 mg


Ranolazine (Ranexa -)  500 mg PO BID [facility]


   Last Admin: 04/07/18 09:23 Dose:  500 mg


Tamsulosin HCl (Flomax -)  0.4 mg PO DAILY@0830 [facility]


   Last Admin: 04/07/18 09:23 Dose:  0.4 mg











- Objective


Vital Signs: 


 Vital Signs











Temperature  97.8 F   04/07/18 09:00


 


Pulse Rate  80   04/07/18 09:00


 


Respiratory Rate  18   04/07/18 09:00


 


Blood Pressure  120/55   04/07/18 09:00


 


O2 Sat by Pulse Oximetry (%)  98   04/07/18 09:00











Eyes: Yes: PERRL


HENT: Yes: Atraumatic


Neck: Yes: Supple


Cardiovascular: Yes: Regular Rate and Rhythm, S1, S2


Respiratory: Yes: Diminished


Gastrointestinal: Yes: Soft, Tenderness (Diffuse)


Edema: No


Additional Findings/Remarks: 











- Review of Systems


Constitutional: denies: Chills, Fever


Cardiovascular: (+) chest pain, (+) SOB. (-) palpitation


Respiratory: reports: Cough and sputum Production


Gastrointestinal: denies: Nausea, Vomiting, Diarrhea, Constipation, (+) 

Abdominal Pain


Musculoskeletal: denies: Joint Pain


Neurological: denies: Dizziness or Headaches








Labs: 


 CBC, BMP





 04/07/18 05:00 





 04/07/18 05:00 





 INR, PTT











INR  1.50  (0.82-1.09)  H  04/06/18  17:30    














Problem List





- Problems


(1) Coronary artery disease


Code(s): I25.10 - ATHSCL HEART DISEASE OF NATIVE CORONARY ARTERY W/O ANG PCTRS 

  


Qualifiers: 


   Coronary Disease-Associated Artery/Lesion type: native artery   Native vs. 

transplanted heart: native heart   Associated angina: without angina   

Qualified Code(s): I25.10 - Atherosclerotic heart disease of native coronary 

artery without angina pectoris   





(2) S/P coronary artery stent placement


Code(s): Z95.5 - PRESENCE OF CORONARY ANGIOPLASTY IMPLANT AND GRAFT   





(3) SOB (shortness of breath)


Code(s): R06.02 - SHORTNESS OF BREATH   





(4) COPD (chronic obstructive pulmonary disease)


Code(s): J44.9 - CHRONIC OBSTRUCTIVE PULMONARY DISEASE, UNSPECIFIED   


Qualifiers: 


   COPD type: COPD with acute exacerbation   Qualified Code(s): J44.1 - Chronic 

obstructive pulmonary disease with (acute) exacerbation   





(5) CVA (cerebral vascular accident)


Code(s): I63.9 - CEREBRAL INFARCTION, UNSPECIFIED   


Qualifiers: 


   CVA mechanism: unspecified   Qualified Code(s): I63.9 - Cerebral infarction, 

unspecified   





(6) Chest pain


Code(s): R07.9 - CHEST PAIN, UNSPECIFIED   


Qualifiers: 


   Chest pain type: unspecified   Qualified Code(s): R07.9 - Chest pain, 

unspecified   





(7) Chronic abdominal pain


Code(s): R10.9 - UNSPECIFIED ABDOMINAL PAIN; G89.29 - OTHER CHRONIC PAIN   





(8) Factor V deficiency


Code(s): D68.2 - HEREDITARY DEFICIENCY OF OTHER CLOTTING FACTORS   





(9) HTN (hypertension)


Code(s): I10 - ESSENTIAL (PRIMARY) HYPERTENSION   


Qualifiers: 


   Hypertension type: essential hypertension   Qualified Code(s): I10 - 

Essential (primary) hypertension   





(10) Hypercholesteremia


Code(s): E78.0 - PURE HYPERCHOLESTEROLEMIA * DO NOT USE *   





(11) Hypothyroid


Code(s): E03.9 - HYPOTHYROIDISM, UNSPECIFIED   


Qualifiers: 


   Hypothyroidism type: due to acquired atrophy of thyroid   Qualified Code(s): 

E03.4 - Atrophy of thyroid (acquired)   





(12) Demand ischemia


Code(s): I24.8 - OTHER FORMS OF ACUTE ISCHEMIC HEART DISEASE   





Assessment/Plan





1. Dyspnea referable to COPD and ILD 


2. CAD with multivessel PCI, angina pectoris


3. Factor V Leiden with history of DVT and PE post IVC filter and on NOAC


4. Cerebrovascular disease


5. Hypertension


6. Hypercholesterolemia


7. Prostate CA


8. 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


9. Hypothyroidism


10. Abdominal pain, etiology unclear





PLAN:


1. Continue Cardizem  mg once a day


2. Continue Eliquis 5 mg BID and Ranexa 500 mg BID as tolerated. Diuretic with 

Lasix with caution


3. Consider abdominal imaging 


4. Continue pulmonary management with IV steroids, bronchodilator and O2


5. Patient was given Metoprolol ER - with caution in view of presence of ILD 

and COPD


6. Continue ASA in view of multivessel CAD even though he is on NOAC





Guarded


Further plans are to follow


Severo Watson MD 72